# Patient Record
Sex: FEMALE | Race: WHITE
[De-identification: names, ages, dates, MRNs, and addresses within clinical notes are randomized per-mention and may not be internally consistent; named-entity substitution may affect disease eponyms.]

---

## 2017-07-01 ENCOUNTER — HEALTH MAINTENANCE LETTER (OUTPATIENT)
Age: 57
End: 2017-07-01

## 2019-10-03 ENCOUNTER — HEALTH MAINTENANCE LETTER (OUTPATIENT)
Age: 59
End: 2019-10-03

## 2020-01-24 ENCOUNTER — TRANSFERRED RECORDS (OUTPATIENT)
Dept: HEALTH INFORMATION MANAGEMENT | Facility: CLINIC | Age: 60
End: 2020-01-24

## 2020-01-24 PROBLEM — R19.00 PELVIC MASS: Status: ACTIVE | Noted: 2020-01-24

## 2020-01-24 PROBLEM — N13.30 HYDRONEPHROSIS: Status: ACTIVE | Noted: 2020-01-24

## 2020-01-24 PROBLEM — R97.1 ELEVATED CA-125: Status: ACTIVE | Noted: 2020-01-24

## 2020-01-29 ENCOUNTER — TRANSFERRED RECORDS (OUTPATIENT)
Dept: HEALTH INFORMATION MANAGEMENT | Facility: CLINIC | Age: 60
End: 2020-01-29

## 2020-02-07 ENCOUNTER — HOSPITAL ENCOUNTER (OUTPATIENT)
Dept: LAB | Facility: CLINIC | Age: 60
Discharge: HOME | DRG: 737 | End: 2020-02-07
Attending: OBSTETRICS & GYNECOLOGY | Admitting: OBSTETRICS & GYNECOLOGY
Payer: MEDICARE

## 2020-02-07 DIAGNOSIS — Z01.83 ENCOUNTER FOR BLOOD TYPING: Primary | ICD-10-CM

## 2020-02-07 PROCEDURE — 86923 COMPATIBILITY TEST ELECTRIC: CPT | Performed by: OBSTETRICS & GYNECOLOGY

## 2020-02-07 PROCEDURE — 86901 BLOOD TYPING SEROLOGIC RH(D): CPT | Performed by: OBSTETRICS & GYNECOLOGY

## 2020-02-07 PROCEDURE — 86850 RBC ANTIBODY SCREEN: CPT | Performed by: OBSTETRICS & GYNECOLOGY

## 2020-02-07 PROCEDURE — 36415 COLL VENOUS BLD VENIPUNCTURE: CPT | Performed by: OBSTETRICS & GYNECOLOGY

## 2020-02-07 PROCEDURE — 86900 BLOOD TYPING SEROLOGIC ABO: CPT | Performed by: OBSTETRICS & GYNECOLOGY

## 2020-02-07 RX ORDER — CYCLOBENZAPRINE HCL 5 MG
5 TABLET ORAL
COMMUNITY

## 2020-02-07 RX ORDER — LOSARTAN POTASSIUM 50 MG/1
50 TABLET ORAL DAILY
COMMUNITY

## 2020-02-07 RX ORDER — ATORVASTATIN CALCIUM 80 MG/1
80 TABLET, FILM COATED ORAL AT BEDTIME
COMMUNITY

## 2020-02-07 RX ORDER — TIMOLOL MALEATE 5 MG/ML
1 SOLUTION/ DROPS OPHTHALMIC EVERY MORNING
COMMUNITY

## 2020-02-08 ENCOUNTER — HEALTH MAINTENANCE LETTER (OUTPATIENT)
Age: 60
End: 2020-02-08

## 2020-02-10 ENCOUNTER — ANESTHESIA EVENT (OUTPATIENT)
Dept: SURGERY | Facility: CLINIC | Age: 60
DRG: 737 | End: 2020-02-10
Payer: MEDICARE

## 2020-02-10 ENCOUNTER — ANESTHESIA (OUTPATIENT)
Dept: SURGERY | Facility: CLINIC | Age: 60
DRG: 737 | End: 2020-02-10
Payer: MEDICARE

## 2020-02-10 ENCOUNTER — HOSPITAL ENCOUNTER (INPATIENT)
Facility: CLINIC | Age: 60
LOS: 17 days | Discharge: HOME | DRG: 737 | End: 2020-02-27
Attending: OBSTETRICS & GYNECOLOGY | Admitting: OBSTETRICS & GYNECOLOGY
Payer: MEDICARE

## 2020-02-10 DIAGNOSIS — R19.00 PELVIC MASS IN FEMALE: ICD-10-CM

## 2020-02-10 DIAGNOSIS — Z13.6 CARDIOVASCULAR SCREENING; LDL GOAL LESS THAN 130: ICD-10-CM

## 2020-02-10 DIAGNOSIS — R19.00 PELVIC MASS: Primary | ICD-10-CM

## 2020-02-10 DIAGNOSIS — G47.00 INSOMNIA: ICD-10-CM

## 2020-02-10 DIAGNOSIS — N13.30 HYDRONEPHROSIS: ICD-10-CM

## 2020-02-10 DIAGNOSIS — F41.9 ANXIETY: ICD-10-CM

## 2020-02-10 DIAGNOSIS — G40.909 EPILEPSY (H): ICD-10-CM

## 2020-02-10 DIAGNOSIS — J45.20 INTERMITTENT ASTHMA, WELL CONTROLLED: ICD-10-CM

## 2020-02-10 DIAGNOSIS — R97.1 ELEVATED CA-125: ICD-10-CM

## 2020-02-10 DIAGNOSIS — Z76.0 MEDICATION REFILL: ICD-10-CM

## 2020-02-10 DIAGNOSIS — J18.9 PNEUMONIA: ICD-10-CM

## 2020-02-10 LAB
ABO + RH BLD: NORMAL
ABO + RH BLD: NORMAL
BLD GP AB SCN SERPL QL: NORMAL
BLD PROD TYP BPU: NORMAL
BLOOD BANK CMNT PATIENT-IMP: NORMAL
BLOOD BANK CMNT PATIENT-IMP: NORMAL
CREAT SERPL-MCNC: 0.56 MG/DL (ref 0.52–1.04)
GFR SERPL CREATININE-BSD FRML MDRD: >90 ML/MIN/{1.73_M2}
NUM BPU REQUESTED: 4
PLATELET # BLD AUTO: 363 10E9/L (ref 150–450)
POTASSIUM SERPL-SCNC: 3.8 MMOL/L (ref 3.4–5.3)
SPECIMEN EXP DATE BLD: NORMAL

## 2020-02-10 PROCEDURE — 25000125 ZZHC RX 250: Performed by: REGISTERED NURSE

## 2020-02-10 PROCEDURE — 88307 TISSUE EXAM BY PATHOLOGIST: CPT | Mod: 26 | Performed by: OBSTETRICS & GYNECOLOGY

## 2020-02-10 PROCEDURE — 0DBW0ZZ EXCISION OF PERITONEUM, OPEN APPROACH: ICD-10-PCS | Performed by: OBSTETRICS & GYNECOLOGY

## 2020-02-10 PROCEDURE — 25800030 ZZH RX IP 258 OP 636: Performed by: NURSE PRACTITIONER

## 2020-02-10 PROCEDURE — 88331 PATH CONSLTJ SURG 1 BLK 1SPC: CPT | Mod: 26 | Performed by: OBSTETRICS & GYNECOLOGY

## 2020-02-10 PROCEDURE — 36000093 ZZH SURGERY LEVEL 4 1ST 30 MIN: Performed by: OBSTETRICS & GYNECOLOGY

## 2020-02-10 PROCEDURE — 88309 TISSUE EXAM BY PATHOLOGIST: CPT | Mod: 26 | Performed by: OBSTETRICS & GYNECOLOGY

## 2020-02-10 PROCEDURE — 0WBH0ZX EXCISION OF RETROPERITONEUM, OPEN APPROACH, DIAGNOSTIC: ICD-10-PCS | Performed by: OBSTETRICS & GYNECOLOGY

## 2020-02-10 PROCEDURE — 36415 COLL VENOUS BLD VENIPUNCTURE: CPT | Performed by: NURSE PRACTITIONER

## 2020-02-10 PROCEDURE — 25000128 H RX IP 250 OP 636: Performed by: ANESTHESIOLOGY

## 2020-02-10 PROCEDURE — 0DBU0ZZ EXCISION OF OMENTUM, OPEN APPROACH: ICD-10-PCS | Performed by: OBSTETRICS & GYNECOLOGY

## 2020-02-10 PROCEDURE — 25800030 ZZH RX IP 258 OP 636: Performed by: ANESTHESIOLOGY

## 2020-02-10 PROCEDURE — 25000128 H RX IP 250 OP 636: Performed by: REGISTERED NURSE

## 2020-02-10 PROCEDURE — 25800030 ZZH RX IP 258 OP 636: Performed by: REGISTERED NURSE

## 2020-02-10 PROCEDURE — 88305 TISSUE EXAM BY PATHOLOGIST: CPT | Performed by: OBSTETRICS & GYNECOLOGY

## 2020-02-10 PROCEDURE — 37000008 ZZH ANESTHESIA TECHNICAL FEE, 1ST 30 MIN: Performed by: OBSTETRICS & GYNECOLOGY

## 2020-02-10 PROCEDURE — 88309 TISSUE EXAM BY PATHOLOGIST: CPT | Performed by: OBSTETRICS & GYNECOLOGY

## 2020-02-10 PROCEDURE — 25000128 H RX IP 250 OP 636: Performed by: NURSE PRACTITIONER

## 2020-02-10 PROCEDURE — 27210794 ZZH OR GENERAL SUPPLY STERILE: Performed by: OBSTETRICS & GYNECOLOGY

## 2020-02-10 PROCEDURE — 0UT20ZZ RESECTION OF BILATERAL OVARIES, OPEN APPROACH: ICD-10-PCS | Performed by: OBSTETRICS & GYNECOLOGY

## 2020-02-10 PROCEDURE — 0TN70ZZ RELEASE LEFT URETER, OPEN APPROACH: ICD-10-PCS | Performed by: OBSTETRICS & GYNECOLOGY

## 2020-02-10 PROCEDURE — 88307 TISSUE EXAM BY PATHOLOGIST: CPT | Performed by: OBSTETRICS & GYNECOLOGY

## 2020-02-10 PROCEDURE — 0UT70ZZ RESECTION OF BILATERAL FALLOPIAN TUBES, OPEN APPROACH: ICD-10-PCS | Performed by: OBSTETRICS & GYNECOLOGY

## 2020-02-10 PROCEDURE — C1765 ADHESION BARRIER: HCPCS | Performed by: OBSTETRICS & GYNECOLOGY

## 2020-02-10 PROCEDURE — 25000128 H RX IP 250 OP 636: Performed by: OBSTETRICS & GYNECOLOGY

## 2020-02-10 PROCEDURE — 12000000 ZZH R&B MED SURG/OB

## 2020-02-10 PROCEDURE — 82565 ASSAY OF CREATININE: CPT | Performed by: NURSE PRACTITIONER

## 2020-02-10 PROCEDURE — 37000009 ZZH ANESTHESIA TECHNICAL FEE, EACH ADDTL 15 MIN: Performed by: OBSTETRICS & GYNECOLOGY

## 2020-02-10 PROCEDURE — 25000132 ZZH RX MED GY IP 250 OP 250 PS 637: Mod: GY | Performed by: NURSE PRACTITIONER

## 2020-02-10 PROCEDURE — 71000012 ZZH RECOVERY PHASE 1 LEVEL 1 FIRST HR: Performed by: OBSTETRICS & GYNECOLOGY

## 2020-02-10 PROCEDURE — 36000063 ZZH SURGERY LEVEL 4 EA 15 ADDTL MIN: Performed by: OBSTETRICS & GYNECOLOGY

## 2020-02-10 PROCEDURE — 25000132 ZZH RX MED GY IP 250 OP 250 PS 637: Mod: GY | Performed by: OBSTETRICS & GYNECOLOGY

## 2020-02-10 PROCEDURE — 88331 PATH CONSLTJ SURG 1 BLK 1SPC: CPT | Performed by: OBSTETRICS & GYNECOLOGY

## 2020-02-10 PROCEDURE — 85049 AUTOMATED PLATELET COUNT: CPT | Performed by: NURSE PRACTITIONER

## 2020-02-10 PROCEDURE — 0UT90ZZ RESECTION OF UTERUS, OPEN APPROACH: ICD-10-PCS | Performed by: OBSTETRICS & GYNECOLOGY

## 2020-02-10 PROCEDURE — 84132 ASSAY OF SERUM POTASSIUM: CPT | Performed by: ANESTHESIOLOGY

## 2020-02-10 PROCEDURE — 25000566 ZZH SEVOFLURANE, EA 15 MIN: Performed by: OBSTETRICS & GYNECOLOGY

## 2020-02-10 PROCEDURE — 88305 TISSUE EXAM BY PATHOLOGIST: CPT | Mod: 26,76 | Performed by: OBSTETRICS & GYNECOLOGY

## 2020-02-10 PROCEDURE — 25000128 H RX IP 250 OP 636: Performed by: NURSE ANESTHETIST, CERTIFIED REGISTERED

## 2020-02-10 PROCEDURE — 07BC0ZZ EXCISION OF PELVIS LYMPHATIC, OPEN APPROACH: ICD-10-PCS | Performed by: OBSTETRICS & GYNECOLOGY

## 2020-02-10 PROCEDURE — 40000170 ZZH STATISTIC PRE-PROCEDURE ASSESSMENT II: Performed by: OBSTETRICS & GYNECOLOGY

## 2020-02-10 PROCEDURE — 07BD0ZZ EXCISION OF AORTIC LYMPHATIC, OPEN APPROACH: ICD-10-PCS | Performed by: OBSTETRICS & GYNECOLOGY

## 2020-02-10 PROCEDURE — 36415 COLL VENOUS BLD VENIPUNCTURE: CPT | Performed by: ANESTHESIOLOGY

## 2020-02-10 PROCEDURE — 25000301 ZZH OR RX SURGIFLO W/THROMBIN KIT 2ML 1991 OPNP: Performed by: OBSTETRICS & GYNECOLOGY

## 2020-02-10 RX ORDER — FENTANYL CITRATE 50 UG/ML
INJECTION, SOLUTION INTRAMUSCULAR; INTRAVENOUS PRN
Status: DISCONTINUED | OUTPATIENT
Start: 2020-02-10 | End: 2020-02-10

## 2020-02-10 RX ORDER — LIDOCAINE HYDROCHLORIDE 20 MG/ML
INJECTION, SOLUTION INFILTRATION; PERINEURAL PRN
Status: DISCONTINUED | OUTPATIENT
Start: 2020-02-10 | End: 2020-02-10

## 2020-02-10 RX ORDER — ONDANSETRON 2 MG/ML
INJECTION INTRAMUSCULAR; INTRAVENOUS PRN
Status: DISCONTINUED | OUTPATIENT
Start: 2020-02-10 | End: 2020-02-10

## 2020-02-10 RX ORDER — CEFAZOLIN SODIUM 1 G/3ML
1 INJECTION, POWDER, FOR SOLUTION INTRAMUSCULAR; INTRAVENOUS SEE ADMIN INSTRUCTIONS
Status: DISCONTINUED | OUTPATIENT
Start: 2020-02-10 | End: 2020-02-10 | Stop reason: HOSPADM

## 2020-02-10 RX ORDER — KETOROLAC TROMETHAMINE 30 MG/ML
30 INJECTION, SOLUTION INTRAMUSCULAR; INTRAVENOUS EVERY 6 HOURS PRN
Status: DISCONTINUED | OUTPATIENT
Start: 2020-02-10 | End: 2020-02-12

## 2020-02-10 RX ORDER — HYDROCODONE BITARTRATE AND ACETAMINOPHEN 5; 325 MG/1; MG/1
1-2 TABLET ORAL EVERY 4 HOURS PRN
Status: DISCONTINUED | OUTPATIENT
Start: 2020-02-10 | End: 2020-02-22

## 2020-02-10 RX ORDER — LOSARTAN POTASSIUM 50 MG/1
50 TABLET ORAL DAILY
Status: DISCONTINUED | OUTPATIENT
Start: 2020-02-11 | End: 2020-02-12

## 2020-02-10 RX ORDER — ONDANSETRON 2 MG/ML
4 INJECTION INTRAMUSCULAR; INTRAVENOUS EVERY 30 MIN PRN
Status: DISCONTINUED | OUTPATIENT
Start: 2020-02-10 | End: 2020-02-10 | Stop reason: HOSPADM

## 2020-02-10 RX ORDER — FENTANYL CITRATE 50 UG/ML
25-50 INJECTION, SOLUTION INTRAMUSCULAR; INTRAVENOUS
Status: DISCONTINUED | OUTPATIENT
Start: 2020-02-10 | End: 2020-02-10 | Stop reason: HOSPADM

## 2020-02-10 RX ORDER — SODIUM CHLORIDE, SODIUM LACTATE, POTASSIUM CHLORIDE, CALCIUM CHLORIDE 600; 310; 30; 20 MG/100ML; MG/100ML; MG/100ML; MG/100ML
INJECTION, SOLUTION INTRAVENOUS CONTINUOUS
Status: DISCONTINUED | OUTPATIENT
Start: 2020-02-10 | End: 2020-02-10 | Stop reason: HOSPADM

## 2020-02-10 RX ORDER — HYDROMORPHONE HYDROCHLORIDE 1 MG/ML
.3-.5 INJECTION, SOLUTION INTRAMUSCULAR; INTRAVENOUS; SUBCUTANEOUS EVERY 5 MIN PRN
Status: DISCONTINUED | OUTPATIENT
Start: 2020-02-10 | End: 2020-02-10 | Stop reason: HOSPADM

## 2020-02-10 RX ORDER — HYDROCODONE BITARTRATE AND ACETAMINOPHEN 5; 325 MG/1; MG/1
1-2 TABLET ORAL EVERY 4 HOURS PRN
Qty: 15 TABLET | Refills: 0 | Status: SHIPPED | OUTPATIENT
Start: 2020-02-10 | End: 2020-02-24

## 2020-02-10 RX ORDER — NALOXONE HYDROCHLORIDE 0.4 MG/ML
.1-.4 INJECTION, SOLUTION INTRAMUSCULAR; INTRAVENOUS; SUBCUTANEOUS
Status: DISCONTINUED | OUTPATIENT
Start: 2020-02-10 | End: 2020-02-10

## 2020-02-10 RX ORDER — IBUPROFEN 600 MG/1
600 TABLET, FILM COATED ORAL EVERY 6 HOURS PRN
Qty: 30 TABLET | Refills: 1 | Status: SHIPPED | OUTPATIENT
Start: 2020-02-10

## 2020-02-10 RX ORDER — ATORVASTATIN CALCIUM 80 MG/1
80 TABLET, FILM COATED ORAL AT BEDTIME
Status: DISCONTINUED | OUTPATIENT
Start: 2020-02-10 | End: 2020-02-27 | Stop reason: HOSPADM

## 2020-02-10 RX ORDER — MEPERIDINE HYDROCHLORIDE 25 MG/ML
12.5 INJECTION INTRAMUSCULAR; INTRAVENOUS; SUBCUTANEOUS EVERY 5 MIN PRN
Status: DISCONTINUED | OUTPATIENT
Start: 2020-02-10 | End: 2020-02-10 | Stop reason: HOSPADM

## 2020-02-10 RX ORDER — SENNOSIDES A AND B 8.6 MG/1
1-3 TABLET, FILM COATED ORAL 2 TIMES DAILY PRN
Qty: 30 TABLET | Refills: 0 | Status: ON HOLD | OUTPATIENT
Start: 2020-02-10 | End: 2020-03-20

## 2020-02-10 RX ORDER — PROPOFOL 10 MG/ML
INJECTION, EMULSION INTRAVENOUS PRN
Status: DISCONTINUED | OUTPATIENT
Start: 2020-02-10 | End: 2020-02-10

## 2020-02-10 RX ORDER — CEFAZOLIN SODIUM 1 G/3ML
1 INJECTION, POWDER, FOR SOLUTION INTRAMUSCULAR; INTRAVENOUS EVERY 8 HOURS
Status: COMPLETED | OUTPATIENT
Start: 2020-02-11 | End: 2020-02-11

## 2020-02-10 RX ORDER — NEOSTIGMINE METHYLSULFATE 1 MG/ML
VIAL (ML) INJECTION PRN
Status: DISCONTINUED | OUTPATIENT
Start: 2020-02-10 | End: 2020-02-10

## 2020-02-10 RX ORDER — LIDOCAINE 40 MG/G
CREAM TOPICAL
Status: DISCONTINUED | OUTPATIENT
Start: 2020-02-10 | End: 2020-02-20

## 2020-02-10 RX ORDER — PROCHLORPERAZINE MALEATE 10 MG
10 TABLET ORAL EVERY 6 HOURS PRN
Status: DISCONTINUED | OUTPATIENT
Start: 2020-02-10 | End: 2020-02-27 | Stop reason: HOSPADM

## 2020-02-10 RX ORDER — ONDANSETRON 2 MG/ML
4 INJECTION INTRAMUSCULAR; INTRAVENOUS EVERY 6 HOURS PRN
Status: DISCONTINUED | OUTPATIENT
Start: 2020-02-10 | End: 2020-02-27 | Stop reason: HOSPADM

## 2020-02-10 RX ORDER — DEXTROSE MONOHYDRATE, SODIUM CHLORIDE, AND POTASSIUM CHLORIDE 50; 1.49; 4.5 G/1000ML; G/1000ML; G/1000ML
INJECTION, SOLUTION INTRAVENOUS CONTINUOUS
Status: ACTIVE | OUTPATIENT
Start: 2020-02-10 | End: 2020-02-19

## 2020-02-10 RX ORDER — ACETAMINOPHEN 325 MG/1
975 TABLET ORAL ONCE
Status: COMPLETED | OUTPATIENT
Start: 2020-02-10 | End: 2020-02-10

## 2020-02-10 RX ORDER — NALOXONE HYDROCHLORIDE 0.4 MG/ML
.1-.4 INJECTION, SOLUTION INTRAMUSCULAR; INTRAVENOUS; SUBCUTANEOUS
Status: DISCONTINUED | OUTPATIENT
Start: 2020-02-10 | End: 2020-02-27 | Stop reason: HOSPADM

## 2020-02-10 RX ORDER — ONDANSETRON 4 MG/1
4 TABLET, ORALLY DISINTEGRATING ORAL EVERY 6 HOURS PRN
Status: DISCONTINUED | OUTPATIENT
Start: 2020-02-10 | End: 2020-02-27 | Stop reason: HOSPADM

## 2020-02-10 RX ORDER — CARBAMAZEPINE 200 MG/1
400 CAPSULE, EXTENDED RELEASE ORAL 2 TIMES DAILY
Status: DISCONTINUED | OUTPATIENT
Start: 2020-02-10 | End: 2020-02-22

## 2020-02-10 RX ORDER — ACETAMINOPHEN 325 MG/1
650 TABLET ORAL EVERY 4 HOURS PRN
Status: DISCONTINUED | OUTPATIENT
Start: 2020-02-10 | End: 2020-02-23

## 2020-02-10 RX ORDER — CEFAZOLIN SODIUM 2 G/100ML
2 INJECTION, SOLUTION INTRAVENOUS
Status: COMPLETED | OUTPATIENT
Start: 2020-02-10 | End: 2020-02-10

## 2020-02-10 RX ORDER — ONDANSETRON 4 MG/1
4 TABLET, ORALLY DISINTEGRATING ORAL EVERY 30 MIN PRN
Status: DISCONTINUED | OUTPATIENT
Start: 2020-02-10 | End: 2020-02-10 | Stop reason: HOSPADM

## 2020-02-10 RX ORDER — DEXAMETHASONE SODIUM PHOSPHATE 4 MG/ML
INJECTION, SOLUTION INTRA-ARTICULAR; INTRALESIONAL; INTRAMUSCULAR; INTRAVENOUS; SOFT TISSUE PRN
Status: DISCONTINUED | OUTPATIENT
Start: 2020-02-10 | End: 2020-02-10

## 2020-02-10 RX ORDER — SODIUM CHLORIDE, SODIUM LACTATE, POTASSIUM CHLORIDE, CALCIUM CHLORIDE 600; 310; 30; 20 MG/100ML; MG/100ML; MG/100ML; MG/100ML
INJECTION, SOLUTION INTRAVENOUS CONTINUOUS PRN
Status: DISCONTINUED | OUTPATIENT
Start: 2020-02-10 | End: 2020-02-10

## 2020-02-10 RX ORDER — CARBAMAZEPINE 400 MG/1
400 TABLET, EXTENDED RELEASE ORAL 2 TIMES DAILY
Status: DISCONTINUED | OUTPATIENT
Start: 2020-02-10 | End: 2020-02-10

## 2020-02-10 RX ORDER — GLYCOPYRROLATE 0.2 MG/ML
INJECTION, SOLUTION INTRAMUSCULAR; INTRAVENOUS PRN
Status: DISCONTINUED | OUTPATIENT
Start: 2020-02-10 | End: 2020-02-10

## 2020-02-10 RX ORDER — CYCLOBENZAPRINE HCL 5 MG
5 TABLET ORAL
Status: DISCONTINUED | OUTPATIENT
Start: 2020-02-10 | End: 2020-02-27 | Stop reason: HOSPADM

## 2020-02-10 RX ADMIN — MIDAZOLAM 2 MG: 1 INJECTION INTRAMUSCULAR; INTRAVENOUS at 16:34

## 2020-02-10 RX ADMIN — PHENYLEPHRINE HYDROCHLORIDE 100 MCG: 10 INJECTION INTRAVENOUS at 18:04

## 2020-02-10 RX ADMIN — NEOSTIGMINE METHYLSULFATE 3.5 MG: 1 INJECTION, SOLUTION INTRAVENOUS at 18:50

## 2020-02-10 RX ADMIN — FENTANYL CITRATE 100 MCG: 50 INJECTION, SOLUTION INTRAMUSCULAR; INTRAVENOUS at 16:40

## 2020-02-10 RX ADMIN — POTASSIUM CHLORIDE, DEXTROSE MONOHYDRATE AND SODIUM CHLORIDE: 150; 5; 450 INJECTION, SOLUTION INTRAVENOUS at 20:51

## 2020-02-10 RX ADMIN — ROCURONIUM BROMIDE 50 MG: 10 INJECTION INTRAVENOUS at 16:40

## 2020-02-10 RX ADMIN — PHENYLEPHRINE HYDROCHLORIDE 100 MCG: 10 INJECTION INTRAVENOUS at 18:09

## 2020-02-10 RX ADMIN — ROPIVACAINE HYDROCHLORIDE 40 ML GIVEN: 5 INJECTION, SOLUTION EPIDURAL; INFILTRATION; PERINEURAL at 19:29

## 2020-02-10 RX ADMIN — ATORVASTATIN CALCIUM 80 MG: 80 TABLET, FILM COATED ORAL at 21:51

## 2020-02-10 RX ADMIN — DEXAMETHASONE SODIUM PHOSPHATE 4 MG: 4 INJECTION, SOLUTION INTRA-ARTICULAR; INTRALESIONAL; INTRAMUSCULAR; INTRAVENOUS; SOFT TISSUE at 17:01

## 2020-02-10 RX ADMIN — SODIUM CHLORIDE, POTASSIUM CHLORIDE, SODIUM LACTATE AND CALCIUM CHLORIDE: 600; 310; 30; 20 INJECTION, SOLUTION INTRAVENOUS at 16:34

## 2020-02-10 RX ADMIN — SODIUM CHLORIDE, POTASSIUM CHLORIDE, SODIUM LACTATE AND CALCIUM CHLORIDE: 600; 310; 30; 20 INJECTION, SOLUTION INTRAVENOUS at 19:44

## 2020-02-10 RX ADMIN — CARBAMAZEPINE 400 MG: 200 CAPSULE, EXTENDED RELEASE ORAL at 22:47

## 2020-02-10 RX ADMIN — SODIUM CHLORIDE, POTASSIUM CHLORIDE, SODIUM LACTATE AND CALCIUM CHLORIDE: 600; 310; 30; 20 INJECTION, SOLUTION INTRAVENOUS at 17:41

## 2020-02-10 RX ADMIN — ROCURONIUM BROMIDE 30 MG: 10 INJECTION INTRAVENOUS at 17:07

## 2020-02-10 RX ADMIN — LIDOCAINE HYDROCHLORIDE 100 MG: 20 INJECTION, SOLUTION INFILTRATION; PERINEURAL at 16:40

## 2020-02-10 RX ADMIN — HYDROMORPHONE HYDROCHLORIDE 0.5 MG: 1 INJECTION, SOLUTION INTRAMUSCULAR; INTRAVENOUS; SUBCUTANEOUS at 19:34

## 2020-02-10 RX ADMIN — ONDANSETRON 4 MG: 2 INJECTION INTRAMUSCULAR; INTRAVENOUS at 18:22

## 2020-02-10 RX ADMIN — SODIUM CHLORIDE, POTASSIUM CHLORIDE, SODIUM LACTATE AND CALCIUM CHLORIDE: 600; 310; 30; 20 INJECTION, SOLUTION INTRAVENOUS at 16:46

## 2020-02-10 RX ADMIN — HYDROMORPHONE HYDROCHLORIDE 0.5 MG: 1 INJECTION, SOLUTION INTRAMUSCULAR; INTRAVENOUS; SUBCUTANEOUS at 16:59

## 2020-02-10 RX ADMIN — CEFAZOLIN SODIUM 2 G: 2 INJECTION, SOLUTION INTRAVENOUS at 16:45

## 2020-02-10 RX ADMIN — HYDROMORPHONE HYDROCHLORIDE 0.5 MG: 1 INJECTION, SOLUTION INTRAMUSCULAR; INTRAVENOUS; SUBCUTANEOUS at 19:22

## 2020-02-10 RX ADMIN — HYDROMORPHONE HYDROCHLORIDE 0.3 MG: 1 INJECTION, SOLUTION INTRAMUSCULAR; INTRAVENOUS; SUBCUTANEOUS at 18:46

## 2020-02-10 RX ADMIN — Medication: at 19:13

## 2020-02-10 RX ADMIN — ROCURONIUM BROMIDE 10 MG: 10 INJECTION INTRAVENOUS at 18:05

## 2020-02-10 RX ADMIN — GLYCOPYRROLATE 0.5 MG: 0.2 INJECTION, SOLUTION INTRAMUSCULAR; INTRAVENOUS at 18:50

## 2020-02-10 RX ADMIN — ACETAMINOPHEN 975 MG: 325 TABLET, FILM COATED ORAL at 13:38

## 2020-02-10 RX ADMIN — PROPOFOL 180 MG: 10 INJECTION, EMULSION INTRAVENOUS at 16:40

## 2020-02-10 RX ADMIN — ROCURONIUM BROMIDE 10 MG: 10 INJECTION INTRAVENOUS at 18:16

## 2020-02-10 RX ADMIN — ROCURONIUM BROMIDE 10 MG: 10 INJECTION INTRAVENOUS at 17:39

## 2020-02-10 ASSESSMENT — ENCOUNTER SYMPTOMS: SEIZURES: 1

## 2020-02-10 ASSESSMENT — MIFFLIN-ST. JEOR: SCORE: 1386.43

## 2020-02-10 NOTE — ANESTHESIA PREPROCEDURE EVALUATION
Anesthesia Pre-Procedure Evaluation    Patient: Charlene Dia   MRN: [id number] : 1960          Preoperative Diagnosis: Elevated cancer antigen 125 () [R97.1]  Groin swelling [R19.09]  Akinetic mutism [R41.89]    Procedure(s):  EXPLORATORY LAPAROTOMY  TOTAL ABDOMINAL HYSTERECTOMY,  WITH BILATERAL SALPINGO-OOPHORECTOMY, TUMOR DEBULKING, POSSIBLE OMENTECTOMY, POSSIBLE PELVIC AND PARA AORTIC LYMPHADENECTOMY  POSSIBLE BOWEL RESECTION, POSSIBLE  OSTOMY    Past Medical History:   Diagnosis Date     Abdominal lump, pelvic mass      Anxiety 2012     Asthma, intermittent controlled      CARDIOVASCULAR SCREENING; LDL GOAL LESS THAN 130      Chronic neck pain      Chronic tension type headache      Cough      Epilepsy (H)      Epilepsy (H)      Hyperlipidemia      Hypertension      Insomnia      Insomnia      Mental developmental delay      Neurotic excoriations      Ovarian cancer (H)      Pain in both hands      Past Surgical History:   Procedure Laterality Date     CYSTECTOMY OVARIAN BENIGN      right     EYE SURGERY      hx cornea transplant     FOOT SURGERY       KERATOPLASTY (LAMELLAR GRAFT)  '94, '96, '00, '02       Anesthesia Evaluation     .             ROS/MED HX    ENT/Pulmonary:     (+)Intermittent asthma , recent URI . .    Neurologic:     (+)seizures     Cardiovascular:     (+) Dyslipidemia, hypertension----. : . . . :. .       METS/Exercise Tolerance:     Hematologic:         Musculoskeletal:         GI/Hepatic:        (-) GERD   Renal/Genitourinary:     (+) Other Renal/ Genitourinary, pelvic mass, hydronephrosis      Endo:  - neg endo ROS       Psychiatric:     (+) psychiatric history anxiety      Infectious Disease:  - neg infectious disease ROS       Malignancy:   (+) Malignancy History of Other  Other CA ovarian CA Active status post         Other:                          Physical Exam  Normal systems: cardiovascular, pulmonary and dental    Airway   Mallampati: II  TM distance:  ">3 FB  Neck ROM: full    Dental     Cardiovascular       Pulmonary             Lab Results   Component Value Date    WBC 13.4 (H) 03/15/2011    HGB 12.1 03/15/2011    HCT 35.8 03/15/2011     03/15/2011     03/15/2011    POTASSIUM 3.8 02/10/2020    CHLORIDE 98 03/15/2011    CO2 24 03/15/2011    BUN 10 03/15/2011    CR 0.67 03/15/2011     (H) 03/15/2011    FRED 9.0 03/15/2011    ALBUMIN 4.8 (H) 04/29/2007    PROTTOTAL 7.3 04/29/2007    ALT 20 06/16/2008    AST 18 06/16/2008    ALKPHOS 84 04/29/2007    BILITOTAL <0.1 (L) 04/29/2007    LIPASE 529 (H) 04/29/2007    HCG Negative 04/29/2007       Preop Vitals  BP Readings from Last 3 Encounters:   02/10/20 131/83   02/20/12 130/90   02/06/12 110/60    Pulse Readings from Last 3 Encounters:   02/20/12 76      Resp Readings from Last 3 Encounters:   02/10/20 16   02/20/12 12    SpO2 Readings from Last 3 Encounters:   02/10/20 96%      Temp Readings from Last 1 Encounters:   02/10/20 36.9  C (98.5  F) (Temporal)    Ht Readings from Last 1 Encounters:   02/10/20 1.727 m (5' 8\")      Wt Readings from Last 1 Encounters:   02/10/20 77.3 kg (170 lb 6.4 oz)    Estimated body mass index is 25.91 kg/m  as calculated from the following:    Height as of this encounter: 1.727 m (5' 8\").    Weight as of this encounter: 77.3 kg (170 lb 6.4 oz).       Anesthesia Plan      History & Physical Review  History and physical reviewed and following examination; no interval change.    ASA Status:  3 .    NPO Status:  > 8 hours    Plan for General, ETT, For Post-op pain in coordination with surgeon and Peripheral Nerve Block with Intravenous induction. Maintenance will be Balanced.    PONV prophylaxis:  Ondansetron (or other 5HT-3) and Dexamethasone or Solumedrol  Additional equipment: 2nd IV      Postoperative Care  Postoperative pain management:  IV analgesics and Peripheral nerve block (Single Shot).      Consents  Anesthetic plan, risks, benefits and alternatives discussed " with:  Patient.  Use of blood products discussed: Yes.   Use of blood products discussed with Patient.  Consented to blood products.  .                 Acosta Berry, DO, DO

## 2020-02-10 NOTE — PROGRESS NOTES
Medication History Completed by Medication Scribe  Admission medication history interview status for the 2/10/2020  admission is complete. See EPIC admission navigator for prior to admission medications     Medication history sources: Patient, Surescrimikal, H&P and Patient's home med list  Medication history source reliability: Good  Adherence assessment: N/A Not Observed    Significant changes made to the medication list:  None      Additional medication history information:   None    Medication reconciliation completed by provider prior to medication history? No    Time spent in this activity: 30 minutes      Prior to Admission medications    Medication Sig Last Dose Taking? Auth Provider   atorvastatin (LIPITOR) 80 MG tablet Take 80 mg by mouth At Bedtime 2/8/2020 at pm Yes Reported, Patient   carbamazepine (TEGRETOL XR) 100 MG 12 hr tablet Take 400 mg by mouth 2 times daily (Takes 4 x 100mg = 400mg) 2/10/2020 at 0800 Yes Reported, Patient   cyclobenzaprine (FLEXERIL) 5 MG tablet Take 5 mg by mouth nightly as needed for muscle spasms more than a week at prn Yes Reported, Patient   losartan (COZAAR) 50 MG tablet Take 50 mg by mouth daily 2/8/2020 at am Yes Reported, Patient   MELATONIN PO Take 10-15 mg by mouth every evening 2/9/2020 Yes Reported, Patient   naphazoline-pheniramine (OPCON-A/VISINE A/NAPHCON A) SOLN ophthalmic solution Place 1-2 drops into both eyes 4 times daily as needed for irritation 2/10/2020 at prn Yes Reported, Patient   sodium chloride (OCEAN) 0.65 % nasal spray Spray 1 spray into both nostrils every morning Past Week at prn Yes Reported, Patient   timolol maleate (TIMOPTIC) 0.5 % ophthalmic solution Place 1 drop into both eyes every morning 2/9/2020 at am Yes Reported, Patient

## 2020-02-11 ENCOUNTER — APPOINTMENT (OUTPATIENT)
Dept: OCCUPATIONAL THERAPY | Facility: CLINIC | Age: 60
DRG: 737 | End: 2020-02-11
Attending: OBSTETRICS & GYNECOLOGY
Payer: MEDICARE

## 2020-02-11 LAB
ANION GAP SERPL CALCULATED.3IONS-SCNC: 5 MMOL/L (ref 3–14)
BASOPHILS # BLD AUTO: 0 10E9/L (ref 0–0.2)
BASOPHILS NFR BLD AUTO: 0 %
BUN SERPL-MCNC: 9 MG/DL (ref 7–30)
CALCIUM SERPL-MCNC: 8 MG/DL (ref 8.5–10.1)
CHLORIDE SERPL-SCNC: 107 MMOL/L (ref 94–109)
CO2 SERPL-SCNC: 23 MMOL/L (ref 20–32)
CREAT SERPL-MCNC: 0.55 MG/DL (ref 0.52–1.04)
DIFFERENTIAL METHOD BLD: ABNORMAL
EOSINOPHIL # BLD AUTO: 0 10E9/L (ref 0–0.7)
EOSINOPHIL NFR BLD AUTO: 0 %
ERYTHROCYTE [DISTWIDTH] IN BLOOD BY AUTOMATED COUNT: 13.2 % (ref 10–15)
GFR SERPL CREATININE-BSD FRML MDRD: >90 ML/MIN/{1.73_M2}
GLUCOSE SERPL-MCNC: 149 MG/DL (ref 70–99)
HCT VFR BLD AUTO: 36.6 % (ref 35–47)
HGB BLD-MCNC: 12 G/DL (ref 11.7–15.7)
IMM GRANULOCYTES # BLD: 0 10E9/L (ref 0–0.4)
IMM GRANULOCYTES NFR BLD: 0.1 %
LYMPHOCYTES # BLD AUTO: 0.7 10E9/L (ref 0.8–5.3)
LYMPHOCYTES NFR BLD AUTO: 9.3 %
MCH RBC QN AUTO: 30.1 PG (ref 26.5–33)
MCHC RBC AUTO-ENTMCNC: 32.8 G/DL (ref 31.5–36.5)
MCV RBC AUTO: 92 FL (ref 78–100)
MONOCYTES # BLD AUTO: 1.1 10E9/L (ref 0–1.3)
MONOCYTES NFR BLD AUTO: 14.8 %
NEUTROPHILS # BLD AUTO: 5.5 10E9/L (ref 1.6–8.3)
NEUTROPHILS NFR BLD AUTO: 75.8 %
NRBC # BLD AUTO: 0 10*3/UL
NRBC BLD AUTO-RTO: 0 /100
PLATELET # BLD AUTO: 409 10E9/L (ref 150–450)
POTASSIUM SERPL-SCNC: 4.2 MMOL/L (ref 3.4–5.3)
RBC # BLD AUTO: 3.99 10E12/L (ref 3.8–5.2)
SODIUM SERPL-SCNC: 135 MMOL/L (ref 133–144)
WBC # BLD AUTO: 7.2 10E9/L (ref 4–11)

## 2020-02-11 PROCEDURE — 36415 COLL VENOUS BLD VENIPUNCTURE: CPT | Performed by: NURSE PRACTITIONER

## 2020-02-11 PROCEDURE — 25000132 ZZH RX MED GY IP 250 OP 250 PS 637: Mod: GY | Performed by: OBSTETRICS & GYNECOLOGY

## 2020-02-11 PROCEDURE — 12000000 ZZH R&B MED SURG/OB

## 2020-02-11 PROCEDURE — 97165 OT EVAL LOW COMPLEX 30 MIN: CPT | Mod: GO

## 2020-02-11 PROCEDURE — 25000132 ZZH RX MED GY IP 250 OP 250 PS 637: Mod: GY | Performed by: NURSE PRACTITIONER

## 2020-02-11 PROCEDURE — 25000128 H RX IP 250 OP 636: Performed by: NURSE PRACTITIONER

## 2020-02-11 PROCEDURE — 85025 COMPLETE CBC W/AUTO DIFF WBC: CPT | Performed by: NURSE PRACTITIONER

## 2020-02-11 PROCEDURE — 25800030 ZZH RX IP 258 OP 636: Performed by: OBSTETRICS & GYNECOLOGY

## 2020-02-11 PROCEDURE — 80048 BASIC METABOLIC PNL TOTAL CA: CPT | Performed by: NURSE PRACTITIONER

## 2020-02-11 PROCEDURE — 97535 SELF CARE MNGMENT TRAINING: CPT | Mod: GO

## 2020-02-11 PROCEDURE — 25800030 ZZH RX IP 258 OP 636: Performed by: NURSE PRACTITIONER

## 2020-02-11 RX ADMIN — CEFAZOLIN 1 G: 1 INJECTION, POWDER, FOR SOLUTION INTRAMUSCULAR; INTRAVENOUS at 00:46

## 2020-02-11 RX ADMIN — ACETAMINOPHEN 650 MG: 325 TABLET, FILM COATED ORAL at 10:34

## 2020-02-11 RX ADMIN — ATORVASTATIN CALCIUM 80 MG: 80 TABLET, FILM COATED ORAL at 21:04

## 2020-02-11 RX ADMIN — PROCHLORPERAZINE EDISYLATE 10 MG: 5 INJECTION INTRAMUSCULAR; INTRAVENOUS at 13:50

## 2020-02-11 RX ADMIN — SODIUM CHLORIDE 500 ML: 9 INJECTION, SOLUTION INTRAVENOUS at 06:17

## 2020-02-11 RX ADMIN — ONDANSETRON 4 MG: 4 TABLET, ORALLY DISINTEGRATING ORAL at 10:34

## 2020-02-11 RX ADMIN — Medication 1 LOZENGE: at 08:18

## 2020-02-11 RX ADMIN — CARBAMAZEPINE 400 MG: 200 CAPSULE, EXTENDED RELEASE ORAL at 21:05

## 2020-02-11 RX ADMIN — POTASSIUM CHLORIDE, DEXTROSE MONOHYDRATE AND SODIUM CHLORIDE: 150; 5; 450 INJECTION, SOLUTION INTRAVENOUS at 18:35

## 2020-02-11 RX ADMIN — Medication 1 LOZENGE: at 00:46

## 2020-02-11 RX ADMIN — ENOXAPARIN SODIUM 40 MG: 40 INJECTION SUBCUTANEOUS at 12:42

## 2020-02-11 RX ADMIN — ONDANSETRON 4 MG: 4 TABLET, ORALLY DISINTEGRATING ORAL at 16:27

## 2020-02-11 RX ADMIN — POTASSIUM CHLORIDE, DEXTROSE MONOHYDRATE AND SODIUM CHLORIDE: 150; 5; 450 INJECTION, SOLUTION INTRAVENOUS at 06:59

## 2020-02-11 RX ADMIN — CEFAZOLIN 1 G: 1 INJECTION, POWDER, FOR SOLUTION INTRAMUSCULAR; INTRAVENOUS at 08:18

## 2020-02-11 RX ADMIN — SODIUM CHLORIDE 500 ML: 9 INJECTION, SOLUTION INTRAVENOUS at 18:45

## 2020-02-11 RX ADMIN — LOSARTAN POTASSIUM 50 MG: 50 TABLET, FILM COATED ORAL at 08:18

## 2020-02-11 RX ADMIN — CARBAMAZEPINE 400 MG: 200 CAPSULE, EXTENDED RELEASE ORAL at 08:18

## 2020-02-11 ASSESSMENT — ACTIVITIES OF DAILY LIVING (ADL)
ADLS_ACUITY_SCORE: 14
ADLS_ACUITY_SCORE: 12
ADLS_ACUITY_SCORE: 15
ADLS_ACUITY_SCORE: 14
ADLS_ACUITY_SCORE: 14
ADLS_ACUITY_SCORE: 15

## 2020-02-11 NOTE — OP NOTE
Procedure Date: 02/10/2020      PREOPERATIVE DIAGNOSIS:  Pelvic mass, elevated CA-125.      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      PROCEDURES:  Exploratory laparotomy, radical resection of pelvic tumor with ADELE/BSO, left ureterolysis, excision of left cul-de-sac and pelvic peritoneum, omentectomy, and left pelvic and paraaortic lymphadenectomy.      INDICATIONS FOR THE PROCEDURE:  The patient is a 59-year-old female who presented to Dr. Pimentel for an annual exam on 01/08/2020.  She was complaining of right lower quadrant pain of a few weeks' duration.  On exam, she was found to have a large nodular mass in the lower half of the abdomen, more pronounced on the right, where it extended to the umbilicus.  Albumin was 4.1, hemoglobin A1c was 5.5.  Pelvic ultrasound revealed extensive loculated fluid with nodular soft tissue septations containing blood flow, highly suspicious for an ovarian carcinoma or peritoneal carcinomatosis.  CA-125 was drawn and was elevated at 321.1 IU/mL.  CT scan of the abdomen and pelvis revealed a large mass in the pelvis measuring 20 x 19 x 17 cm with thick septations but no obvious solid components, mild ascites, abnormal increased density of mesenteric and omental fat without obvious nodularity, mild left hydronephrosis and hydroureter.  She was seen in consultation in my office.  We elected to proceed with exploratory laparotomy, ADELE/BSO and likely staging.      PROCEDURE IN DETAIL:  The patient was taken to the operating room.  She was placed in a supine position on the operating room table.  She received broad-spectrum antibiotic prophylaxis.  Knee-high sequential compression devices were placed on her lower extremities.  General endotracheal anesthesia was administered from a supine position.  Once intubated, she was repositioned in low lithotomy position using well-padded Florencio stirrups.  Her arms were extended on arm boards at her shoulder  level or below.  She was prepped and draped in the usual sterile fashion, including insertion of a 16-Wallisian Perez catheter into her bladder.  A timeout was conducted, and everyone agreed upon the procedure.  I started by making a midline vertical incision from the symphysis pubis around the right side of the umbilicus and up to the infra-epigastric area.  It was taken down through the subcutaneous tissue through the fascia and through the natural division plane between the muscles.  The posterior rectus sheath was opened.  We then tented up the peritoneum and opened the peritoneum and extended this superiorly and inferiorly.  She had bloody ascites present when we entered the abdomen.  She had a multiloculated ovarian neoplasm that appeared to be coming from the left side, since the right side appeared normal.  This was densely adherent posteriorly to the pelvic peritoneum on the left-hand side near the colon, but not involving the colon.  She had nodular excrescences that were very suspicious for carcinoma, both superiorly to the left and posteriorly.  Her uterus was small and anterior to this big mass.  I was able to bring a large portion of the mass out of the abdomen.  I placed a Bookwalter retractor around the incisions and used lateral and inferior retractor blades to optimize visualization.  I then put the mass back in the abdomen.  I packed the small bowel and colon away with moist laps and a moist towel, held in place by a malleable retractor to the Bookwalter.  The round ligaments on either side were divided with the LigaSure device.  We opened up the posterior broad ligament peritoneum, isolated the ovarian vessels, making sure the ureter was not in them, and cauterized and divided the ovarian vessels at the pelvic brim with the LigaSure device.  We undercut the peritoneum towards the uterus.  The vesicouterine peritoneum was taken down, and the bladder was taken down off the anterior surface of the  cervix and upper vagina.  I then started mobilizing the mass.  You could feel that it was adherent to the pelvic sidewall, and I had to pry it off the pelvic peritoneum to get it up where we could divide the mass free of the cornua of the uterus.  Once this was done, the mass was passed off the table.  Two Carmalt clamps were placed at the angles of the uterus.  I then went back and dissected out the ureter, which had been densely adherent to the medial aspect of the posterior broad ligament peritoneum.  This was tagged with a vessel loop and lysed free from its entrance into the pelvis all the way to its entrance into the cardinal web.  I was then able to resect the pelvic peritoneum where the mass had been attached.  There was also some fibrinous changes in the presacral peritoneum that were biopsied as well, and I did a biopsy of the left pelvic peritoneum as well.  Given the malignant nature of this, I was waiting for pathology, but started doing the staging at this point.  We used upper abdominal retraction to optimize visualization.  The omentum was brought out of the abdomen.  We used electrocautery to free up the peritoneal attachments of the omentum to the sigmoid colon from the hepatic flexure to the splenic flexure.  We mobilized the gastrocolic ligament, and then we divided the omentum just below the gastroepiploic arcade out towards the spleen and then divided the omentum free near the splenic hilum with the LigaSure device.  It was passed off the table.  I then opened up the white line of Toldt along the descending colon.  I reflected the colon and ureter medially and held this in place with a retractor.  I had Stephany, my assistant, hold the ureter out of harm's way and dissected the left paraaortic nodes around the area of the BERNICE and above this area.  I then went to the pelvis.  I dissected out the lymph-bearing fatty tissue from the circumflex iliac vein to the distal common iliac artery.  Then, the  lymph-bearing fatty tissue was dissected off the external iliac artery and vein and out of the obturator fossa as well, freeing it up from the nerves and vessels as well as external iliac vein superiorly.  This was passed off as left pelvic lymphadenectomy.  Pathology did call back, stating that this was a highly poorly differentiated adenocarcinoma that had possibly serous or clear cell features, but that would be determined and the final pathology as well as where it originated from, whether it was ovarian or fallopian tube.  At that point, we spent some time making the left pelvic sidewall hemostatic.  There had been some neovascularization.  There were areas that were oversewn with 3-0 Vicryl suture, and there were other areas that were just treated with Surgiflo in that area with good hemostasis.  We then laid Seprafilm in the pelvis.  We also allowed the bowel to fall into its natural position.  We laid Seprafilm over the transverse mesocolon and over the bowel underlying the incision.  We then went ahead and closed, making sure the lap Sponge and instrument counts were correct.  We closed the incision with a mass closure using #1 looped PDS from superior and inferior aspects to the midline.  This was reinforced with #1 Ethibond sutures in an interrupted fashion.  Subcutaneous tissue was irrigated, and the skin was reapproximated with staples.  A TAP block was placed by Anesthesia after this, and a binder will be placed around her abdomen.  Estimated blood loss for the procedure 200 mL.      Stephany Hanley was my primary assist.  She helped me with all aspects of the case, providing necessary countertraction and performing her part of the hysterectomy and helped me with opening and closing of the abdomen.         GARY MESSER MD             D: 02/10/2020   T: 2020   MT: [id number]      Name:     JACQUELIN BOLDEN   MRN:      [id number]        Account:        [id number]   :      1960            Procedure Date: 02/10/2020      Document: [id number]       cc: Bessy Pimentel MD

## 2020-02-11 NOTE — PLAN OF CARE
POD 1. A&Ox4. VSS on RA. Capno on, WNL. C/o incisional pain, PCA dilaudid; somewhat effective. Midline dressing, drainage market. Clear liquid diet, tolerating. Perez patent. MD notified for low UO; fluid bolus ordered, currently infusing. L PIV infusing @75ml/hr, int abx. Discharge pending.

## 2020-02-11 NOTE — ANESTHESIA POSTPROCEDURE EVALUATION
Patient: Charlene Dia    Procedure(s):  EXPLORATORY LAPAROTOMY  TOTAL ABDOMINAL HYSTERECTOMY,  WITH BILATERAL SALPINGO-OOPHORECTOMY, TUMOR DEBULKING, OMENTECTOMY, PELVIC PARA  AORTIC LYMPHADENECTOMY, LEFT URETERAL LYSIS    Diagnosis:Elevated cancer antigen 125 () [R97.1]  Groin swelling [R19.09]  Akinetic mutism [R41.89]  Diagnosis Additional Information: No value filed.    Anesthesia Type:  General, ETT    Note:  Anesthesia Post Evaluation    Patient location during evaluation: PACU  Patient participation: Able to fully participate in evaluation  Level of consciousness: awake  Pain management: adequate  Airway patency: patent  Cardiovascular status: acceptable  Respiratory status: acceptable  Hydration status: acceptable  PONV: none     Anesthetic complications: None          Last vitals:  Vitals:    02/10/20 1950 02/10/20 2000 02/10/20 2028   BP: 109/58 111/54 116/69   Pulse: 60 58    Resp: 8 11 14   Temp:   35.9  C (96.6  F)   SpO2: 96% 96% 95%         Electronically Signed By: Earl White MD  February 10, 2020  9:25 PM

## 2020-02-11 NOTE — PROVIDER NOTIFICATION
MD Notification    Notified Person: MD    Notified Person Name: Braydon    Notification Date/Time: 2/11/20 1600    Notification Interaction: Telephone    Purpose of Notification: Patients BP 90/40,  mL- fluid bolus was this AM.    Orders Received: Continue to monitor BP. Hold BP medications if systolic BP <110     Comments:

## 2020-02-11 NOTE — PLAN OF CARE
0400-9324 A&O, very lethargic. VSS on 2L. Clear liquid diet, tolerating well. PIV infusing. C/O abdominal pain, PCA pump effective. Midline incision, dressing marked. Perez in place. Continue to monitor.

## 2020-02-11 NOTE — BRIEF OP NOTE
[facility]    Brief Operative Note    Pre-operative diagnosis: Elevated cancer antigen 125 () [R97.1]  Groin swelling [R19.09]  Akinetic mutism [R41.89]  Post-operative diagnosis Same as pre-operative diagnosis    Procedure: Procedure(s):  EXPLORATORY LAPAROTOMY  TOTAL ABDOMINAL HYSTERECTOMY,  WITH BILATERAL SALPINGO-OOPHORECTOMY, TUMOR DEBULKING, OMENTECTOMY, PELVIC PARA  AORTIC LYMPHADENECTOMY, LEFT URETERAL LYSIS  Surgeon: Surgeon(s) and Role:     * Paula Bryson MD - Primary  Anesthesia: General   Estimated blood loss: 200 ml  Drains: None  Specimens:   ID Type Source Tests Collected by Time Destination   A : LEFT OVARIAN TUMOR AND LEFT FALLOPIAN TUBE Tissue Fallopian Tube and Ovary, Left SURGICAL PATHOLOGY EXAM Paula Bryson MD 2/10/2020  5:29 PM    B : PRE SACRAL PERITONEUM Tissue Peritoneum SURGICAL PATHOLOGY EXAM Paula Bryson MD 2/10/2020  5:30 PM    C : UTERUS, CERVIX, RIGHT FALLOPIAN TUBE AND RIGHT OVARY Tissue Uterus, Cervix, Right Fallopian Tube SURGICAL PATHOLOGY EXAM aPula Bryson MD 2/10/2020  5:35 PM    D : LEFT PELVIC PERITONEUM Tissue Peritoneum SURGICAL PATHOLOGY EXAM Paula Bryson MD 2/10/2020  5:37 PM    E : OMENTUM Tissue Omentum SURGICAL PATHOLOGY EXAM Paula Bryson MD 2/10/2020  5:46 PM    F : LEFT PARA  AORTIC LYMPH NODE Tissue Lymph Node, Left Para Aortic SURGICAL PATHOLOGY EXAM Paula Bryson MD 2/10/2020  5:59 PM    G : LEFT PELVIC LYMPH NODE Tissue Lymph Node, Left Pelvic SURGICAL PATHOLOGY EXAM Paula Bryson MD 2/10/2020  6:01 PM      Findings:   Very large, multicystic left adnexal tumor, adherent to colon. Frozen demonstrates high grade ovarian vs fallopian tube cancer.   Complications: None.  Implants: * No implants in log *

## 2020-02-11 NOTE — PLAN OF CARE
Discharge Planner PT   Patient plan for discharge: home with assist as needed  Current status: PT: Order received; per chart review and conversation with evaluating OT, patient has no current PT needs at this time; patient independent with bed mobility, tx, and gait; Was able to ambulate 300 feet without an assistive device and no LOB; has no stairs; All needs met during OT session. Will complete PT order.  Barriers to return to prior living situation: defer to OT  Recommendations for discharge: defer to OT  Rationale for recommendations: Patient has no current PT needs identified at this time; Mobilizing without assist of an assistive device; family able to provide cares as needed. OT able to meet patient needs during hospitalization.       Entered by: Padma Pablo 02/11/2020 3:14 PM

## 2020-02-11 NOTE — PLAN OF CARE
Discharge Planner OT   Patient plan for discharge: home  Current status: OT eval/tx initiated. Pt lives alone in apt, is ind w/ all ADL's/IADL's at baseline.     After training, pt was able to complete bed mobility using log roll w/ spv-mod I. Ind w/ transfers. Mobility in/out of room x ~300 ft, initially was spv, progressed to ind w/ no LOB throughout. Pt able to don/doff socks ind seated EOB. Increased time spent educating on abd precautions and implications for safety w/ ADL/IADL and mobility. Pt and daughter have no further self-care concerns, as family will help w/ transportation/shopping upon d/c.   Barriers to return to prior living situation: none  Recommendations for discharge: home w/ A for shopping/transportation and higher level cleaning tasks  Rationale for recommendations: Pt is mobilizing well, demonstrated good maintenance of abdominal precautions after training during ADL's and functional mobility. Anticipate pt will safely return home w/ above A upon discharge.        Entered by: Patricia Silva 02/11/2020 11:39 AM     Occupational Therapy Discharge Summary    Reason for therapy discharge:    Discharged to home.    Progress towards therapy goal(s). [facility] for goal details.  Goals met    Therapy recommendation(s):    No further therapy is recommended.

## 2020-02-11 NOTE — PROGRESS NOTES
02/11/20 1031   Quick Adds   Type of Visit Initial Occupational Therapy Evaluation   Living Environment   Lives With alone   Living Arrangements apartment   Home Accessibility no concerns   Transportation Anticipated family or friend will provide   Living Environment Comment Pt has a walk-in shower w/ grab bars and a built in shower bench. Pt has grab bars by toilet    Self-Care   Usual Activity Tolerance good   Current Activity Tolerance moderate   Equipment Currently Used at Home none   Functional Level   Ambulation 0-->independent   Transferring 0-->independent   Toileting 0-->independent   Bathing 0-->independent   Dressing 0-->independent   Eating 0-->independent   Communication 0-->understands/communicates without difficulty   Swallowing 0-->swallows foods/liquids without difficulty   Cognition 0 - no cognition issues reported   Fall history within last six months no   Which of the above functional risks had a recent onset or change? ambulation;dressing;bathing   Prior Functional Level Comment PTA, pt lives alone in a handicap accessible apt, ind w/ all ADL's and IADL's w/o A.D.    General Information   Onset of Illness/Injury or Date of Surgery - Date 02/10/20   Referring Physician Paula Bryson MD   Patient/Family Goals Statement Return home   Additional Occupational Profile Info/Pertinent History of Current Problem POD 1 X-lap, radical resection of left adnexal tumor with TAHBSO, left ureterolysis, omentectomy, left pelvic and para-aortic lymphadenectomy.    Precautions/Limitations fall precautions;abdominal precautions   Cognitive Status Examination   Orientation orientation to person, place and time   Level of Consciousness alert   Follows Commands (Cognition) WNL   Visual Perception   Visual Perception Wears glasses   Pain Assessment   Patient Currently in Pain Yes, see Vital Sign flowsheet  (abdominal tightness, did not rate numerically)   Range of Motion (ROM)   ROM Comment BUE AROM WFL  "  Mobility   Bed Mobility Comments SBA and cueing   Transfer Skill: Sit to Stand   Level of [place]: Sit/Stand independent   Bathing   Level of [place] stand-by assist   Upper Body Dressing   Level of [place]: Dress Upper Body stand-by assist   Lower Body Dressing   Level of [place]: Dress Lower Body stand-by assist   Toileting   Level of [place]: Toilet stand-by assist   Grooming   Level of [place]: Grooming stand-by assist   Eating/Self Feeding   Level of [place]: Eating independent   Activities of Daily Living Analysis   Impairments Contributing to Impaired Activities of Daily Living post surgical precautions;pain   General Therapy Interventions   Planned Therapy Interventions ADL retraining;IADL retraining;bed mobility training;progressive activity/exercise;home program guidelines   Clinical Impression   Criteria for Skilled Therapeutic Interventions Met yes, treatment indicated   OT Diagnosis Decreased ind/ease w/ ADL's and IADL's   Influenced by the following impairments New abdominal precautions, post-op pain, decreased functional act tolerance   Assessment of Occupational Performance 1-3 Performance Deficits   Identified Performance Deficits Decreased ind/ease w/ bed mobility, functional mobility, dressing, bathing, toileting, IADLs   Clinical Decision Making (Complexity) Low complexity   Therapy Frequency   (eval and 1 treatment)   Predicted Duration of Therapy Intervention (days/wks) 1 day   Anticipated Discharge Disposition Home with Assist   Risks and Benefits of Treatment have been explained. Yes   Patient, Family & other staff in agreement with plan of care Yes   [facility] [employer] TM \"6 Clicks\"   2016, Trustees of [facility], under license to [employer].  All rights reserved.   6 Clicks Short Forms Daily Activity Inpatient Short Form   [facility] AM-PAC  \"6 Clicks\" Daily Activity Inpatient Short Form   1. Putting on and taking off regular lower " body clothing? 3 - A Little   2. Bathing (including washing, rinsing, drying)? 3 - A Little   3. Toileting, which includes using toilet, bedpan or urinal? 3 - A Little   4. Putting on and taking off regular upper body clothing? 3 - A Little   5. Taking care of personal grooming such as brushing teeth? 3 - A Little   6. Eating meals? 4 - None   Daily Activity Raw Score (Score out of 24.Lower scores equate to lower levels of function) 19   Total Evaluation Time   Total Evaluation Time (Minutes) 8

## 2020-02-11 NOTE — PROGRESS NOTES
"[facility]  Hospitalist Progress Note          Assessment and Plan:     Pelvic mass, elevated CA-125, left hydronephrosis:  POD 1 X-lap, radical resection of left adnexal tumor with TAHBSO, left ureterolysis, omentectomy, left pelvic and para-aortic lymphadenectomy. Discussed intra-op findings, procedure and likely chemotherapy in adjuvant setting once path back and recovered from surgery.  Clear liquid diet today.  Discontinue capnography.  Increase IV to 100 mL/hr until taking orals well.  Encourage ambulation      DVT Prophylaxis:  On lovenox and will continue for 4 wks after surgery.  Will need lovenox teaching prior to discharge       Epilepsy:  Continue Carbamazepine       Hyperlipidemia:  On Atorvastatin       Disposition:  Will have OT/PT and discharge planning see her to evaluate post op needed               Interval History:   Some pain as expected.  Has stood at bedside one time thus far              Medications:   I have reviewed this patient's current medications               Physical Exam:   Blood pressure 116/41, pulse 58, temperature 97.1  F (36.2  C), temperature source Oral, resp. rate 16, height 1.727 m (5' 8\"), weight 77.3 kg (170 lb 6.4 oz), SpO2 91 %.        Vital Sign Ranges  Temperature Temp  Av.2  F (36.2  C)  Min: 96.6  F (35.9  C)  Max: 98.5  F (36.9  C)   Blood pressure Systolic (24hrs), Av , Min:109 , Max:133        Diastolic (24hrs), Av, Min:41, Max:83      Pulse Pulse  Av.1  Min: 55  Max: 73   Respirations Resp  Av.9  Min: 8  Max: 19   Pulse oximetry SpO2  Av.3 %  Min: 91 %  Max: 100 %         Intake/Output Summary (Last 24 hours) at 2020 0824  Last data filed at 2020 0730  Gross per 24 hour   Intake 3000 ml   Output 550 ml   Net 2450 ml       Lungs:   Diminished BS at bases      Cardiovascular:   normal apical pulses      Abdomen:   Hypoactive BS, sanguinous drainage on dressing, mildly distended     Musculoskeletal:   no lower " extremity pitting edema present                Data:   All laboratory data reviewed

## 2020-02-11 NOTE — ANESTHESIA CARE TRANSFER NOTE
Patient: Charlene Dia    Procedure(s):  EXPLORATORY LAPAROTOMY  TOTAL ABDOMINAL HYSTERECTOMY,  WITH BILATERAL SALPINGO-OOPHORECTOMY, TUMOR DEBULKING, OMENTECTOMY, PELVIC PARA  AORTIC LYMPHADENECTOMY, LEFT URETERAL LYSIS    Diagnosis: Elevated cancer antigen 125 () [R97.1]  Groin swelling [R19.09]  Akinetic mutism [R41.89]  Diagnosis Additional Information: No value filed.    Anesthesia Type:   General, ETT     Note:  Airway :Face Mask  Patient transferred to:PACU  Comments: Transferred to PACU, spontaneous respirations, 10L oxygen via facemask.  All monitors and alarms on and functioning, VSS.  Patient awake, comfortable.  Report to PACU RN.Handoff Report: Identifed the Patient, Identified the Reponsible Provider, Reviewed the pertinent medical history, Discussed the surgical course, Reviewed Intra-OP anesthesia mangement and issues during anesthesia, Set expectations for post-procedure period and Allowed opportunity for questions and acknowledgement of understanding      Vitals: (Last set prior to Anesthesia Care Transfer)    CRNA VITALS  2/10/2020 1828 - 2/10/2020 1904      2/10/2020             NIBP:  (!) 141/95    Pulse:  90    NIBP Mean:  113    SpO2:  100 %    Resp Rate (observed):  (!) 32                Electronically Signed By: GLORY Kaufman CRNA  February 10, 2020  7:04 PM

## 2020-02-11 NOTE — PLAN OF CARE
POD1. Pt is A/Ox4. VSS, on RA. Capno discontinued. Incisional pain, rating 5/10 for most of shift, PCA dilaudid in place. Received tylenol x1 for headache. Midline incision-covered, drainage marked>no change from overnight. Hypo BS, not passing gas, clear liquid diet. Had 2 episodes of emesis this shift, zofran and compazine given. Perez patent w/ adequate tea colored UOP. IVF increased to 100/hr. Up SBA, ambulated in hallway twice this shift. Instructions on IS given, pt self admin. PT/OT consulted. Discharge pending post-op recovery.

## 2020-02-11 NOTE — PROGRESS NOTES
LIZABETH:  acknowledges consult. LIZABETH to visit patient Wed 2/12.    RILEY Fowler  Daytime (8:00am-4:30pm): [phone]  After-Hours LIZABETH Pager (4:30pm-11:30pm): [phone]

## 2020-02-11 NOTE — PROVIDER NOTIFICATION
MD Notification    Notified Person: MD    Notified Person Name: Dr. Bryson    Notification Date/Time: 0600, 2/11/2020    Notification Interaction: Phone call    Purpose of Notification: Low urine output    Orders Received:   500mL fluid bolus ordered & hemoglobin check this morning.

## 2020-02-12 ENCOUNTER — TELEPHONE (OUTPATIENT)
Dept: ONCOLOGY | Facility: CLINIC | Age: 60
End: 2020-02-12

## 2020-02-12 ENCOUNTER — APPOINTMENT (OUTPATIENT)
Dept: CT IMAGING | Facility: CLINIC | Age: 60
DRG: 737 | End: 2020-02-12
Attending: NURSE PRACTITIONER
Payer: MEDICARE

## 2020-02-12 ENCOUNTER — APPOINTMENT (OUTPATIENT)
Dept: GENERAL RADIOLOGY | Facility: CLINIC | Age: 60
DRG: 737 | End: 2020-02-12
Attending: OBSTETRICS & GYNECOLOGY
Payer: MEDICARE

## 2020-02-12 LAB
ALBUMIN SERPL-MCNC: 2.4 G/DL (ref 3.4–5)
ALBUMIN UR-MCNC: 100 MG/DL
ALP SERPL-CCNC: 85 U/L (ref 40–150)
ALT SERPL W P-5'-P-CCNC: 17 U/L (ref 0–50)
AMORPH CRY #/AREA URNS HPF: ABNORMAL /HPF
ANION GAP SERPL CALCULATED.3IONS-SCNC: 4 MMOL/L (ref 3–14)
APPEARANCE UR: ABNORMAL
AST SERPL W P-5'-P-CCNC: 9 U/L (ref 0–45)
BASE DEFICIT BLDV-SCNC: 1.6 MMOL/L
BILIRUB SERPL-MCNC: 0.4 MG/DL (ref 0.2–1.3)
BILIRUB UR QL STRIP: NEGATIVE
BLD PROD TYP BPU: NORMAL
BLD UNIT ID BPU: 0
BLOOD PRODUCT CODE: NORMAL
BPU ID: NORMAL
BUN SERPL-MCNC: 17 MG/DL (ref 7–30)
CALCIUM SERPL-MCNC: 8.1 MG/DL (ref 8.5–10.1)
CHLORIDE SERPL-SCNC: 104 MMOL/L (ref 94–109)
CO2 SERPL-SCNC: 24 MMOL/L (ref 20–32)
COLOR UR AUTO: YELLOW
CREAT SERPL-MCNC: 1.42 MG/DL (ref 0.52–1.04)
CREAT UR-MCNC: 290 MG/DL
ERYTHROCYTE [DISTWIDTH] IN BLOOD BY AUTOMATED COUNT: 13.5 % (ref 10–15)
ERYTHROCYTE [DISTWIDTH] IN BLOOD BY AUTOMATED COUNT: 13.6 % (ref 10–15)
FRACT EXCRET NA UR+SERPL-RTO: <0 %
GFR SERPL CREATININE-BSD FRML MDRD: 40 ML/MIN/{1.73_M2}
GLUCOSE SERPL-MCNC: 158 MG/DL (ref 70–99)
GLUCOSE UR STRIP-MCNC: 30 MG/DL
HCO3 BLDV-SCNC: 25 MMOL/L (ref 21–28)
HCT VFR BLD AUTO: 30.4 % (ref 35–47)
HCT VFR BLD AUTO: 33.8 % (ref 35–47)
HGB BLD-MCNC: 10 G/DL (ref 11.7–15.7)
HGB BLD-MCNC: 11.1 G/DL (ref 11.7–15.7)
HGB UR QL STRIP: ABNORMAL
KETONES UR STRIP-MCNC: NEGATIVE MG/DL
LACTATE BLD-SCNC: 1 MMOL/L (ref 0.7–2)
LACTATE BLD-SCNC: 1.4 MMOL/L (ref 0.7–2)
LEUKOCYTE ESTERASE UR QL STRIP: ABNORMAL
MCH RBC QN AUTO: 30.1 PG (ref 26.5–33)
MCH RBC QN AUTO: 30.3 PG (ref 26.5–33)
MCHC RBC AUTO-ENTMCNC: 32.8 G/DL (ref 31.5–36.5)
MCHC RBC AUTO-ENTMCNC: 32.9 G/DL (ref 31.5–36.5)
MCV RBC AUTO: 92 FL (ref 78–100)
MCV RBC AUTO: 92 FL (ref 78–100)
MUCOUS THREADS #/AREA URNS LPF: PRESENT /LPF
NITRATE UR QL: NEGATIVE
NT-PROBNP SERPL-MCNC: 215 PG/ML (ref 0–900)
O2/TOTAL GAS SETTING VFR VENT: ABNORMAL %
OXYHGB MFR BLDV: 34 %
PCO2 BLDV: 47 MM HG (ref 40–50)
PH BLDV: 7.33 PH (ref 7.32–7.43)
PH UR STRIP: 5.5 PH (ref 5–7)
PLATELET # BLD AUTO: 384 10E9/L (ref 150–450)
PLATELET # BLD AUTO: 427 10E9/L (ref 150–450)
PO2 BLDV: 23 MM HG (ref 25–47)
POTASSIUM SERPL-SCNC: 4.5 MMOL/L (ref 3.4–5.3)
PROT SERPL-MCNC: 5.4 G/DL (ref 6.8–8.8)
RBC # BLD AUTO: 3.32 10E12/L (ref 3.8–5.2)
RBC # BLD AUTO: 3.66 10E12/L (ref 3.8–5.2)
RBC #/AREA URNS AUTO: 36 /HPF (ref 0–2)
SODIUM SERPL-SCNC: 132 MMOL/L (ref 133–144)
SODIUM UR-SCNC: <5 MMOL/L
SOURCE: ABNORMAL
SP GR UR STRIP: 1.02 (ref 1–1.03)
TRANSFUSION STATUS PATIENT QL: NORMAL
TROPONIN I SERPL-MCNC: <0.015 UG/L (ref 0–0.04)
UROBILINOGEN UR STRIP-MCNC: NORMAL MG/DL (ref 0–2)
WBC # BLD AUTO: 19 10E9/L (ref 4–11)
WBC # BLD AUTO: 20.5 10E9/L (ref 4–11)
WBC #/AREA URNS AUTO: 10 /HPF (ref 0–5)

## 2020-02-12 PROCEDURE — 83605 ASSAY OF LACTIC ACID: CPT | Performed by: NURSE PRACTITIONER

## 2020-02-12 PROCEDURE — 80053 COMPREHEN METABOLIC PANEL: CPT | Performed by: NURSE PRACTITIONER

## 2020-02-12 PROCEDURE — 36415 COLL VENOUS BLD VENIPUNCTURE: CPT | Performed by: NURSE PRACTITIONER

## 2020-02-12 PROCEDURE — 82805 BLOOD GASES W/O2 SATURATION: CPT | Performed by: NURSE PRACTITIONER

## 2020-02-12 PROCEDURE — 74176 CT ABD & PELVIS W/O CONTRAST: CPT

## 2020-02-12 PROCEDURE — 25800030 ZZH RX IP 258 OP 636: Performed by: OBSTETRICS & GYNECOLOGY

## 2020-02-12 PROCEDURE — 93005 ELECTROCARDIOGRAM TRACING: CPT

## 2020-02-12 PROCEDURE — 36415 COLL VENOUS BLD VENIPUNCTURE: CPT | Performed by: OBSTETRICS & GYNECOLOGY

## 2020-02-12 PROCEDURE — 84484 ASSAY OF TROPONIN QUANT: CPT | Performed by: NURSE PRACTITIONER

## 2020-02-12 PROCEDURE — 85027 COMPLETE CBC AUTOMATED: CPT | Performed by: OBSTETRICS & GYNECOLOGY

## 2020-02-12 PROCEDURE — 71045 X-RAY EXAM CHEST 1 VIEW: CPT

## 2020-02-12 PROCEDURE — 99291 CRITICAL CARE FIRST HOUR: CPT | Performed by: NURSE PRACTITIONER

## 2020-02-12 PROCEDURE — 85027 COMPLETE CBC AUTOMATED: CPT | Performed by: NURSE PRACTITIONER

## 2020-02-12 PROCEDURE — 25800030 ZZH RX IP 258 OP 636: Performed by: INTERNAL MEDICINE

## 2020-02-12 PROCEDURE — 83605 ASSAY OF LACTIC ACID: CPT | Performed by: OBSTETRICS & GYNECOLOGY

## 2020-02-12 PROCEDURE — 87040 BLOOD CULTURE FOR BACTERIA: CPT | Performed by: NURSE PRACTITIONER

## 2020-02-12 PROCEDURE — 84300 ASSAY OF URINE SODIUM: CPT | Performed by: NURSE PRACTITIONER

## 2020-02-12 PROCEDURE — 12000000 ZZH R&B MED SURG/OB

## 2020-02-12 PROCEDURE — 81001 URINALYSIS AUTO W/SCOPE: CPT | Performed by: NURSE PRACTITIONER

## 2020-02-12 PROCEDURE — 25800030 ZZH RX IP 258 OP 636: Performed by: NURSE PRACTITIONER

## 2020-02-12 PROCEDURE — 25000132 ZZH RX MED GY IP 250 OP 250 PS 637: Mod: GY | Performed by: NURSE PRACTITIONER

## 2020-02-12 PROCEDURE — 93010 ELECTROCARDIOGRAM REPORT: CPT | Performed by: INTERNAL MEDICINE

## 2020-02-12 PROCEDURE — 25000128 H RX IP 250 OP 636: Performed by: NURSE PRACTITIONER

## 2020-02-12 PROCEDURE — 83880 ASSAY OF NATRIURETIC PEPTIDE: CPT | Performed by: NURSE PRACTITIONER

## 2020-02-12 PROCEDURE — 82570 ASSAY OF URINE CREATININE: CPT | Performed by: NURSE PRACTITIONER

## 2020-02-12 PROCEDURE — 25000132 ZZH RX MED GY IP 250 OP 250 PS 637: Mod: GY | Performed by: OBSTETRICS & GYNECOLOGY

## 2020-02-12 RX ORDER — ALBUMIN, HUMAN INJ 5% 5 %
25 SOLUTION INTRAVENOUS ONCE
Status: COMPLETED | OUTPATIENT
Start: 2020-02-12 | End: 2020-02-13

## 2020-02-12 RX ORDER — HEPARIN SODIUM 5000 [USP'U]/.5ML
5000 INJECTION, SOLUTION INTRAVENOUS; SUBCUTANEOUS EVERY 8 HOURS
Status: DISCONTINUED | OUTPATIENT
Start: 2020-02-12 | End: 2020-02-18

## 2020-02-12 RX ORDER — PIPERACILLIN SODIUM, TAZOBACTAM SODIUM 3; .375 G/15ML; G/15ML
3.38 INJECTION, POWDER, LYOPHILIZED, FOR SOLUTION INTRAVENOUS EVERY 6 HOURS
Status: DISCONTINUED | OUTPATIENT
Start: 2020-02-12 | End: 2020-02-16

## 2020-02-12 RX ADMIN — SODIUM CHLORIDE, POTASSIUM CHLORIDE, SODIUM LACTATE AND CALCIUM CHLORIDE 500 ML: 600; 310; 30; 20 INJECTION, SOLUTION INTRAVENOUS at 16:08

## 2020-02-12 RX ADMIN — HEPARIN SODIUM 5000 UNITS: 5000 INJECTION, SOLUTION INTRAVENOUS; SUBCUTANEOUS at 09:28

## 2020-02-12 RX ADMIN — CARBAMAZEPINE 400 MG: 200 CAPSULE, EXTENDED RELEASE ORAL at 09:28

## 2020-02-12 RX ADMIN — PIPERACILLIN AND TAZOBACTAM 3.38 G: 3; .375 INJECTION, POWDER, FOR SOLUTION INTRAVENOUS at 20:05

## 2020-02-12 RX ADMIN — POTASSIUM CHLORIDE, DEXTROSE MONOHYDRATE AND SODIUM CHLORIDE: 150; 5; 450 INJECTION, SOLUTION INTRAVENOUS at 20:54

## 2020-02-12 RX ADMIN — ACETAMINOPHEN 650 MG: 325 TABLET, FILM COATED ORAL at 20:54

## 2020-02-12 RX ADMIN — PIPERACILLIN AND TAZOBACTAM 3.38 G: 3; .375 INJECTION, POWDER, FOR SOLUTION INTRAVENOUS at 13:32

## 2020-02-12 RX ADMIN — ATORVASTATIN CALCIUM 80 MG: 80 TABLET, FILM COATED ORAL at 20:55

## 2020-02-12 RX ADMIN — HEPARIN SODIUM 5000 UNITS: 5000 INJECTION, SOLUTION INTRAVENOUS; SUBCUTANEOUS at 18:12

## 2020-02-12 RX ADMIN — ONDANSETRON 4 MG: 2 INJECTION INTRAMUSCULAR; INTRAVENOUS at 05:59

## 2020-02-12 RX ADMIN — SODIUM CHLORIDE, POTASSIUM CHLORIDE, SODIUM LACTATE AND CALCIUM CHLORIDE 500 ML: 600; 310; 30; 20 INJECTION, SOLUTION INTRAVENOUS at 07:46

## 2020-02-12 RX ADMIN — PIPERACILLIN AND TAZOBACTAM 3.38 G: 3; .375 INJECTION, POWDER, FOR SOLUTION INTRAVENOUS at 10:19

## 2020-02-12 RX ADMIN — CARBAMAZEPINE 400 MG: 200 CAPSULE, EXTENDED RELEASE ORAL at 20:54

## 2020-02-12 RX ADMIN — ONDANSETRON 4 MG: 4 TABLET, ORALLY DISINTEGRATING ORAL at 16:07

## 2020-02-12 RX ADMIN — SODIUM CHLORIDE 500 ML: 9 INJECTION, SOLUTION INTRAVENOUS at 03:59

## 2020-02-12 RX ADMIN — POTASSIUM CHLORIDE, DEXTROSE MONOHYDRATE AND SODIUM CHLORIDE: 150; 5; 450 INJECTION, SOLUTION INTRAVENOUS at 09:27

## 2020-02-12 ASSESSMENT — ACTIVITIES OF DAILY LIVING (ADL)
ADLS_ACUITY_SCORE: 14

## 2020-02-12 NOTE — PLAN OF CARE
POD2. A&Ox4. Soft Bps. 2L oxygen via NC. Up SBA, can be impulsive. Pulled out Verde & IV at shift change. MD notified for low UO, oxygen need, & verde removal. Chest XRAY ordered, verde reinserted, & 500mL bolus ordered. IS at bedside, needs encouragement for use. Full liquid diet. Emesis x1, zofran given. Bowel sounds hypoactive, denies gas. Midline incision with dried drainage, dressing marked & abdominal binder in place, abdomen rounded with slight irregular contuour. PCA dilaudid for pain control, somewhat effective. PIV infusing @100ml/hr. Verde patent.  (RRT called for altered mental status & chest pressure. Pt could not state name, place, or time. C/o chest pressure & severe abdominal pain. Labs ordered & rounding team to see this AM. See MD note.)

## 2020-02-12 NOTE — PROGRESS NOTES
POD2. A/Ox4. Tmax 99.7. All other VSS, weaned off of O2, sating at 96% on RA. Pressure pain to abd, PCA (0.2mg dilaudid) in use. Hypo BS, not passing gas, made NPO this AM. Abd distended, midline incision KAMLA, WNL. CT of abd/pelvis completed today-unremarkable. Received additonal 500mL fluid bolus this AM. Cr: 1.42. Nephrology consulted-yet to see pt. Perez w/ 230mL output this shift-MD aware and awaiting nephrology's recommendation. UA w/ small amt of WBCs, already receiving abx. Blood cultures drawn today. PIV w/ IVF. Up SBA, ambulating in hallway. Discharge pending bowel function.

## 2020-02-12 NOTE — PROVIDER NOTIFICATION
MD Notification    Notified Person: MD    Notified Person Name: BIJU Hanley    Notification Date/Time: 2/11/20 9782    Notification Interaction: Telephone    Purpose of Notification: Patients BP 94/41, all other VSS. Low UOP.    Orders Received: Give 500 ml NS fluid bolus, monitor BP and UOP.    Comments:

## 2020-02-12 NOTE — PLAN OF CARE
POD1. A&O. VSS ex soft BPs. Up SBA, ambulated in the halls. Tolerating clear liquids, advanced to fulls. Perez in place. 500 ml fluid bolus given for low urine output and soft bps. C/O nausea given zofran. PCA pump effective at pain control. PIV infusing. Continue to monitor.

## 2020-02-12 NOTE — CODE/RAPID RESPONSE
[facility]    RRT Note  2/12/2020   Time Called: 0530    RRT called for: confusion, chest pressure    Assessment & Plan     Acute Pain in the post operative setting  Acute Kidney Injury  --concern for possible intraabdominal process including but not limited to bleeding and ischemia in the post operative setting  I was called to evaluate the patient for development of acute confusion and complaints of chest pressure.  Nursing staff reports when the patient was woke out of sleep at approximately 530 this morning she was slightly confused which was not her baseline.  The confusion improved with further conversation and waking.  However the patient did initially complain of some chest pressure upon further questioning it was noted that she is having increased abdominal pain.  Nursing staff report the patient has had markedly low urine output overnight requiring 2 fluid boluses of 500 mils of saline.  She is postop day 2 from exploratory lap for ADELE/BSO, with left ureterolysis, omentectomy, left pelvic and para-aortic lymphadenectomy.  Upon my arrival patient is lying in bed, ill-appearing, pale with complaints of acute abdominal pain.  Brief neurologic exam completed no focal deficits noted, patient is slightly confused to time.  Patient's abdominal binder was opened and abdominal distention present predominantly to the lower abdomen and patient is very tender to palpation.  The patient's pain and abdominal distention does seem out of proportion for the situation.  Patient did use 3.2 mg of Dilaudid overnight via her PCA.   There is shadowed bloody drainage on surgical dressing.    Bladder scan was performed however unable to achieve optimal positioning due to location of surgical incision.  Bladder scan does indicate approximately 184 mL of fluid.  Perez catheter does appear patent, draining concentrated urine.   I did contact Dr. Jessica Beavers partner of Dr. Bryson regarding patient's status  and findings on abdominal assessment.  Her recommendations at this time are to defer any abdominal scanning to the primary team with this is been completed by Dr. Bryson's group this morning.  Thus I will defer any abdominal scans at this time to the primary team and complete laboratory studies for further work-up.  Patient's blood pressure did decrease during RRT to 105/35 and SOO noted on laboratory studies thus an additional 500 of LR bolus ordered until assessment by primary team.    INTERVENTIONS:  -CBC, CMP, LA, VBG, BNP, troponin stat  -EKG stat  -Zofran 4mg IV now  -LR 500ml bolus    At the end of the RRT provider hand off was given to my colleague Silvia Mabry NP for ongoing management.    Discussed with and defer further cares to Dr. Saravanan Beavers, flying [facility] RN, and bedside RN.    Interval History     Charlene Dia is a 59 year old female who was admitted on 2/10/2020 for exploratory lap for radical resection of left adnexal tumor with TAHBSO, left ureterolysis, omentectomy, left pelvic and para-aortic lymphadenectomy.    Medical history significant for:   Past Medical History:   Diagnosis Date     Abdominal lump, pelvic mass      Anxiety 2/6/2012     Asthma, intermittent controlled      CARDIOVASCULAR SCREENING; LDL GOAL LESS THAN 130      Chronic neck pain      Chronic tension type headache      Cough      Epilepsy (H)      Epilepsy (H)      Hyperlipidemia      Hypertension      Insomnia      Insomnia      Mental developmental delay      Neurotic excoriations      Ovarian cancer (H)      Pain in both hands      Past Surgical History:   Procedure Laterality Date     CYSTECTOMY OVARIAN BENIGN  07/07    right     EYE SURGERY      hx cornea transplant     FOOT SURGERY  04/03     HYSTERECTOMY TOTAL ABDOMINAL, BILATERAL SALPINGO-OOPHORECTOMY, NODE DISSECTION, COMBINED N/A 2/10/2020    Procedure: TOTAL ABDOMINAL HYSTERECTOMY,  WITH BILATERAL SALPINGO-OOPHORECTOMY, TUMOR DEBULKING, OMENTECTOMY, PELVIC PARA   AORTIC LYMPHADENECTOMY, LEFT URETERAL LYSIS;  Surgeon: Paula Bryson MD;  Location:  OR     KERATOPLASTY (LAMELLAR GRAFT)  '94, '96, '00, '02     LAPAROTOMY, STAGING, COMBINED N/A 2/10/2020    Procedure: EXPLORATORY LAPAROTOMY;  Surgeon: Paula Bryson MD;  Location:  OR       Code Status: No Order    Allergies   No Known Allergies    Physical Exam   Vital Signs with Ranges:  Temp:  [97.1  F (36.2  C)-99.7  F (37.6  C)] 99.2  F (37.3  C)  Pulse:  [78] 78  Heart Rate:  [] 105  Resp:  [16-20] 20  BP: ()/(33-62) 126/56  SpO2:  [90 %-94 %] 94 %  I/O last 3 completed shifts:  In: 620 [P.O.:120; I.V.:500]  Out: 425 [Urine:425]    Constitutional: alert, pale, ill-appearing  ENT: mucus membrane dry, EOM intact, pupils equal and reactive to light   Neck: supple  Pulmonary: Clear bilaterally, no wheezing, crackles, or rhonchi, normal work of breathing  Cardiovascular: Normal rate and rhythm, S1, S2, no murmur, rub, or gallop  GI: bowel sounds are active, tender to palpation, lower abdominal distention   Skin/Integumen: pale, dry  Neuro: alert, oriented x3, no focal deficits  Psych:  Flat affect  Extremities: no peripheral edema    Data     EKG:  Interpreted by GLORY Leslie CNP  Time reviewed: 0635  Symptoms at time of EKG: chest pressure   Rhythm: normal sinus   Rate: Normal  Axis: Normal  Ectopy: premature atrial contraction  Conduction: normal  ST Segments/ T Waves: No acute ischemic changes  Q Waves: none  Comparison to prior: T wave flattening in V3, V4    Clinical Impression: no acute changes    ABG:  -  Recent Labs   Lab 02/12/20  0606   O2PER 2lpm       Troponin:    No lab results found.    IMAGING: (X-ray/CT/MRI)   Recent Results (from the past 24 hour(s))   XR Chest Port 1 View    Narrative    EXAM: XR CHEST PORT 1 VW  LOCATION: [facility]  DATE/TIME: 2/12/2020 2:38 AM    INDICATION: Low O2 sats.  COMPARISON: 03/15/2011.      Impression    IMPRESSION: Heart is  slightly enlarged. No pneumothorax. Small amount of bibasilar atelectasis or infiltrate. No significant pleural effusions. Bony thorax unremarkable.       CBC with Diff:  Recent Labs   Lab Test 02/11/20  0703   WBC 7.2   HGB 12.0   MCV 92           Lactic Acid:    No results found for: LACT        Comprehensive Metabolic Panel:  Recent Labs   Lab 02/11/20  0703      POTASSIUM 4.2   CHLORIDE 107   CO2 23   ANIONGAP 5   *   BUN 9   CR 0.55   GFRESTIMATED >90   GFRESTBLACK >90   FRED 8.0*       BNP:  2/12/2020 214      Time Spent on this Encounter   I spent 60 minutes of critical care time on the unit/floor managing the care of Charlene Dia. Upon evaluation, this patient had a high probability of imminent or life-threatening deterioration due to possible acute abdominal process vs ACS with hemodynamic changes, which required my direct attention, intervention, and personal management. 100% of my time was spent at the bedside counseling the patient and/or coordinating care regarding services listed in this note.    GLORY Leslie CNP

## 2020-02-12 NOTE — PROVIDER NOTIFICATION
MD Notification    Notified Person: MD    Notified Person Name: Dr. Jessica Beavers     Notification Date/Time: 2/12/2020, 0325    Notification Interaction: Phone call    Purpose of Notification: Low UO after verde was reinserted. Chest XRAY complete.     Orders Received:

## 2020-02-12 NOTE — CONSULTS
Medication coverage check for Enoxaparin. $112.05 is the copay for this medication for her.    Bessy Landry CphT  [facility] Discharge Pharmacy Liaison  Liaison Cell: [phone]

## 2020-02-12 NOTE — PROVIDER NOTIFICATION
MD Notification    Notified Person: MD    Notified Person Name: Stephany Hanley    Notification Date/Time: 02/12/20 11:06 AM    Notification Interaction: phone call    Purpose of Notification: UA: small amt of leukocytes    Orders Received: No new orders, keep monitoring     Comments:

## 2020-02-12 NOTE — PROGRESS NOTES
"[facility]  Hospitalist Progress Note          Assessment and Plan:     Pelvic mass, elevated CA-125, left hydronephrosis:  POD 2 X-lap, radical resection of left adnexal tumor with TAHBSO, left ureterolysis, omentectomy, left pelvic and para-aortic lymphadenectomy. Will likely chemotherapy in adjuvant setting once path back and recovered from surgery.      -Leukocytosis: BC's and UC pending. Tmax 99.7. Start Zosyn. CT A/p w/o contrast not suspicious for infection. Remain NPO for now. Monitor. Repeat CBC, CMP in am.     -SOO/Oliguria: Creat up to 1.42 from 0.55 today. UOP lower despite 3  boluses and IVF@ 100cc/hr. CT demonstrates unremarkable kidneys and ureters. Avoid nephrotoxic agents. Nephrology consult. Monitor.     -Chest pain: CXR with small amount of bibasilar atelectasis or infiltrate. No significant pleural effusions.  Continue O2 per NC to keep sats >92%. Encourage IS frequently. Ambulate frequently.     DVT Prophylaxis:  Switch from Lovenox to heparin for now due to SOO. Will Rx lovenox on discharge x 28 days.     Epilepsy:  Continue Carbamazepine    Hyperlipidemia:  On Atorvastatin    Disposition:  OT/PT following. Recommend home independently.  Will continue to monitor.              Interval History:   Some pain as expected in abdomen. States its more gas pains. Denies flatus. Currently denies CP or SOB. Alert, aware she is in the hospital.               Medications:   I have reviewed this patient's current medications               Physical Exam:   Blood pressure 125/41, pulse 78, temperature 99.7  F (37.6  C), temperature source Oral, resp. rate 18, height 1.727 m (5' 8\"), weight 77.3 kg (170 lb 6.4 oz), SpO2 94 %.        Vital Sign Ranges  Temperature Temp  Av.2  F (36.2  C)  Min: 96.6  F (35.9  C)  Max: 98.5  F (36.9  C)   Blood pressure Systolic (24hrs), Av , Min:109 , Max:133        Diastolic (24hrs), Av, Min:41, Max:83      Pulse Pulse  Av.1  Min: 55  " Max: 73   Respirations Resp  Av.9  Min: 8  Max: 19   Pulse oximetry SpO2  Av.3 %  Min: 91 %  Max: 100 %           Intake/Output Summary (Last 24 hours) at 2020  Last data filed at 2020  Gross per 24 hour   Intake 512 ml   Output 360 ml   Net 152 ml       Lungs:   Diminished BS throughout     Cardiovascular:   normal apical pulses      Abdomen:    BS+, abdomen moderately distended, tympanitic, no pain to palpation, no gaurding sanguinous drainage on dressing, dressing removed. Incision c/d/i.      Musculoskeletal:   no lower extremity pitting edema present                Data:   All laboratory data reviewed

## 2020-02-12 NOTE — CONSULTS
[facility]    Nephrology Consultation     Date of Admission:  2/10/2020    Assessment & Plan     Charlene Dia is a 59 year old female who was admitted on 2/10/2020.     1) Baseline normal kidney function.    2) L adnexal mass: S/P elective exploratory laparotomy, radical resection of pelvic tumor with ADELE/BSO, left ureterolysis, excision of left cul-de-sac and pelvic peritoneum, omentectomy, and left pelvic and paraaortic lymphadenectomy. This was for L adnexal tumor.      3) SOO:  Obstruction is high on the list of possibilities in this setting, but CT does not suggest this.  FENA suggests that she is dry.  No IV contrast, NSAIDs or other nephrotoxins given.      4) HTN:  She was on losartan which can amplify the effects of volume depletion on the kidney.  This has been stopped.      Plan:    I think she just needs more fluid.  I will repeat the bolus of  mL now.  And I will increase her maintenance IV fluid to 150 mL/hour.    Following.      Ivan Cobos MD  [facility] - Nephrology  [phone]    Reason for Consult     I was asked to see the patient for SOO.      Primary Care Physician     Remington Ramirez    Chief Complaint     Do I need more surgery ?    History is obtained from the patient and chart review.      History of Present Illness     Charlene Dia is a 59 year old female who presents with SOO.    She underwent elective exploratory laparotomy, radical resection of pelvic tumor with ADELE/BSO, left ureterolysis, excision of left cul-de-sac and pelvic peritoneum, omentectomy, and left pelvic and paraaortic lymphadenectomy. This was for L adnexal tumor.      Prior to surgery her cr was normal - 0.55.    Cr up to 1.42 this AM with oliguria.    CT without contrast done shows no hydronephrosis or hydroureter.      FENA is  < 0.1 as U na < 5.    Not SOB.  Still has urine output but only 255 over day shift.         Past Medical History   I have reviewed this patient's  medical history and updated it with pertinent information if needed.   Past Medical History:   Diagnosis Date     Abdominal lump, pelvic mass      Anxiety 2/6/2012     Asthma, intermittent controlled      CARDIOVASCULAR SCREENING; LDL GOAL LESS THAN 130      Chronic neck pain      Chronic tension type headache      Cough      Epilepsy (H)      Epilepsy (H)      Hyperlipidemia      Hypertension      Insomnia      Insomnia      Mental developmental delay      Neurotic excoriations      Ovarian cancer (H)      Pain in both hands        Past Surgical History   I have reviewed this patient's surgical history and updated it with pertinent information if needed.  Past Surgical History:   Procedure Laterality Date     CYSTECTOMY OVARIAN BENIGN  07/07    right     EYE SURGERY      hx cornea transplant     FOOT SURGERY  04/03     HYSTERECTOMY TOTAL ABDOMINAL, BILATERAL SALPINGO-OOPHORECTOMY, NODE DISSECTION, COMBINED N/A 2/10/2020    Procedure: TOTAL ABDOMINAL HYSTERECTOMY,  WITH BILATERAL SALPINGO-OOPHORECTOMY, TUMOR DEBULKING, OMENTECTOMY, PELVIC PARA  AORTIC LYMPHADENECTOMY, LEFT URETERAL LYSIS;  Surgeon: Paula Bryson MD;  Location:  OR     KERATOPLASTY (LAMELLAR GRAFT)  '94, '96, '00, '02     LAPAROTOMY, STAGING, COMBINED N/A 2/10/2020    Procedure: EXPLORATORY LAPAROTOMY;  Surgeon: Paula Bryson MD;  Location:  OR       Prior to Admission Medications   Prior to Admission Medications   Prescriptions Last Dose Informant Patient Reported? Taking?   MELATONIN PO 2/9/2020 Self Yes Yes   Sig: Take 10-15 mg by mouth every evening   atorvastatin (LIPITOR) 80 MG tablet 2/8/2020 at pm Self Yes Yes   Sig: Take 80 mg by mouth At Bedtime   carbamazepine (TEGRETOL XR) 100 MG 12 hr tablet 2/10/2020 at 0800 Self Yes Yes   Sig: Take 400 mg by mouth 2 times daily (Takes 4 x 100mg = 400mg)   cyclobenzaprine (FLEXERIL) 5 MG tablet more than a week at prn Self Yes Yes   Sig: Take 5 mg by mouth nightly as needed for  muscle spasms   losartan (COZAAR) 50 MG tablet 2/8/2020 at am Self Yes Yes   Sig: Take 50 mg by mouth daily   naphazoline-pheniramine (OPCON-A/VISINE A/NAPHCON A) SOLN ophthalmic solution 2/10/2020 at prn Self Yes Yes   Sig: Place 1-2 drops into both eyes 4 times daily as needed for irritation   sodium chloride (OCEAN) 0.65 % nasal spray Past Week at prn Self Yes Yes   Sig: Spray 1 spray into both nostrils every morning   timolol maleate (TIMOPTIC) 0.5 % ophthalmic solution 2/9/2020 at am Self Yes Yes   Sig: Place 1 drop into both eyes every morning      Facility-Administered Medications: None     Allergies   No Known Allergies    Social History   I have reviewed this patient's social history and updated it with pertinent information if needed. Charlene Dia  reports that she has never smoked. She has never used smokeless tobacco. She reports that she does not drink alcohol or use drugs.    Family History   I have reviewed this patient's family history and updated it with pertinent information if needed.   Family History   Problem Relation Age of Onset     Asthma Mother      Arthritis Mother      Heart Disease Mother      Diabetes Father      Alcohol/Drug Father      Heart Disease Father      Heart Disease Sister        Review of Systems   The 10 point Review of Systems is negative other than noted in the HPI.     Physical Exam   Temp: 99.2  F (37.3  C) Temp src: Oral BP: 112/55 Pulse: 78 Heart Rate: 92 Resp: 16 SpO2: 96 % O2 Device: None (Room air) Oxygen Delivery: 1 LPM  Vital Signs with Ranges  Temp:  [97.7  F (36.5  C)-99.7  F (37.6  C)] 99.2  F (37.3  C)  Pulse:  [78] 78  Heart Rate:  [] 92  Resp:  [14-21] 16  BP: ()/(33-62) 112/55  SpO2:  [90 %-96 %] 96 %  170 lbs 6.4 oz    GENERAL:  alert, NAD  HEENT:  Normocephalic. No gross abnormalities.  Pupils equal.  MMM.  Dentition is ok.  CV: RRR, no murmurs, no clicks, gallops, or rubs, no edema, no carotid bruits  RESP: Clear bilaterally with good  efforts  GI: Abdomen soft/nt/nd, BS present. No masses, organomegaly  MUSCULOSKELETAL: extremities nl - no gross deformities noted  SKIN: no suspicious lesions or rashes, dry to touch  NEURO:  Moves all extremities with purpose.    PSYCH: mood good, affect appropriate  LYMPH: No palpable ant/post cervical and supraclavicular adenopathy    Data   BMP  Recent Labs   Lab 02/12/20  0606 02/11/20  0703 02/10/20  2151 02/10/20  1324   * 135  --   --    POTASSIUM 4.5 4.2  --  3.8   CHLORIDE 104 107  --   --    FRED 8.1* 8.0*  --   --    CO2 24 23  --   --    BUN 17 9  --   --    CR 1.42* 0.55 0.56  --    * 149*  --   --      Phos@[email or web address](phos:4)  CBC)  Recent Labs   Lab 02/12/20  0606 02/11/20  0703 02/10/20  2151   WBC 19.0* 7.2  --    HGB 11.1* 12.0  --    HCT 33.8* 36.6  --    MCV 92 92  --     409 363     Recent Labs   Lab 02/12/20  0606   AST 9   ALT 17   ALKPHOS 85   BILITOTAL 0.4     No lab results found in last 7 days.  No results found for: D2VIT, D3VIT, DTOT  Recent Labs   Lab 02/12/20  0606   HGB 11.1*   HCT 33.8*   MCV 92     No results for input(s): PTHI in the last 168 hours.

## 2020-02-12 NOTE — PROVIDER NOTIFICATION
MD Notification    Notified Person: MD    Notified Person Name: BIJU Hanley     Notification Date/Time: 2045 2/11/20    Notification Interaction: Telephone    Purpose of Notification: BP 99/35 after fluid bolus, urine output 50 ml since 1 PM.    Orders Received: BMP in AM. Recheck BP in an hour if diastolic BP <40 give another 500 ml NS fluid bolus. Continue to monitor for symptoms.     Comments: New order parameters for losartan hold if systolic <110 or diastolic <80

## 2020-02-12 NOTE — PROVIDER NOTIFICATION
MD Notification    Notified Person: MD    Notified Person Name:    Notification Date/Time: 0140    Notification Interaction: Phone call    Purpose of Notification: Pt having low UO, pulled out Perez, & low oxygen sats. Please advise.     Orders Received: Order portable chest Xray & replace Perez.

## 2020-02-13 ENCOUNTER — APPOINTMENT (OUTPATIENT)
Dept: CT IMAGING | Facility: CLINIC | Age: 60
DRG: 737 | End: 2020-02-13
Attending: NURSE PRACTITIONER
Payer: MEDICARE

## 2020-02-13 ENCOUNTER — APPOINTMENT (OUTPATIENT)
Dept: PHYSICAL THERAPY | Facility: CLINIC | Age: 60
DRG: 737 | End: 2020-02-13
Attending: OBSTETRICS & GYNECOLOGY
Payer: MEDICARE

## 2020-02-13 LAB
ALBUMIN SERPL-MCNC: 2.3 G/DL (ref 3.4–5)
ALP SERPL-CCNC: 101 U/L (ref 40–150)
ALT SERPL W P-5'-P-CCNC: 20 U/L (ref 0–50)
ANION GAP SERPL CALCULATED.3IONS-SCNC: 5 MMOL/L (ref 3–14)
AST SERPL W P-5'-P-CCNC: 19 U/L (ref 0–45)
BASOPHILS # BLD AUTO: 0 10E9/L (ref 0–0.2)
BASOPHILS NFR BLD AUTO: 0 %
BILIRUB SERPL-MCNC: 0.4 MG/DL (ref 0.2–1.3)
BUN SERPL-MCNC: 15 MG/DL (ref 7–30)
CALCIUM SERPL-MCNC: 8.1 MG/DL (ref 8.5–10.1)
CHLORIDE SERPL-SCNC: 106 MMOL/L (ref 94–109)
CO2 SERPL-SCNC: 22 MMOL/L (ref 20–32)
CREAT SERPL-MCNC: 1 MG/DL (ref 0.52–1.04)
DIFFERENTIAL METHOD BLD: ABNORMAL
EOSINOPHIL # BLD AUTO: 0 10E9/L (ref 0–0.7)
EOSINOPHIL NFR BLD AUTO: 0 %
ERYTHROCYTE [DISTWIDTH] IN BLOOD BY AUTOMATED COUNT: 13.8 % (ref 10–15)
GFR SERPL CREATININE-BSD FRML MDRD: 61 ML/MIN/{1.73_M2}
GLUCOSE SERPL-MCNC: 143 MG/DL (ref 70–99)
HCT VFR BLD AUTO: 28.8 % (ref 35–47)
HGB BLD-MCNC: 9.4 G/DL (ref 11.7–15.7)
IMM GRANULOCYTES # BLD: 0.1 10E9/L (ref 0–0.4)
IMM GRANULOCYTES NFR BLD: 0.3 %
INTERPRETATION ECG - MUSE: NORMAL
LYMPHOCYTES # BLD AUTO: 0.7 10E9/L (ref 0.8–5.3)
LYMPHOCYTES NFR BLD AUTO: 3.8 %
MCH RBC QN AUTO: 29.9 PG (ref 26.5–33)
MCHC RBC AUTO-ENTMCNC: 32.6 G/DL (ref 31.5–36.5)
MCV RBC AUTO: 92 FL (ref 78–100)
MONOCYTES # BLD AUTO: 1.1 10E9/L (ref 0–1.3)
MONOCYTES NFR BLD AUTO: 6 %
NEUTROPHILS # BLD AUTO: 16.5 10E9/L (ref 1.6–8.3)
NEUTROPHILS NFR BLD AUTO: 89.9 %
NRBC # BLD AUTO: 0 10*3/UL
NRBC BLD AUTO-RTO: 0 /100
PLATELET # BLD AUTO: 335 10E9/L (ref 150–450)
POTASSIUM SERPL-SCNC: 4.5 MMOL/L (ref 3.4–5.3)
PROT SERPL-MCNC: 5.2 G/DL (ref 6.8–8.8)
RBC # BLD AUTO: 3.14 10E12/L (ref 3.8–5.2)
SODIUM SERPL-SCNC: 133 MMOL/L (ref 133–144)
WBC # BLD AUTO: 18.3 10E9/L (ref 4–11)

## 2020-02-13 PROCEDURE — 85025 COMPLETE CBC W/AUTO DIFF WBC: CPT | Performed by: NURSE PRACTITIONER

## 2020-02-13 PROCEDURE — 71275 CT ANGIOGRAPHY CHEST: CPT

## 2020-02-13 PROCEDURE — 36415 COLL VENOUS BLD VENIPUNCTURE: CPT | Performed by: NURSE PRACTITIONER

## 2020-02-13 PROCEDURE — 25000132 ZZH RX MED GY IP 250 OP 250 PS 637: Mod: GY | Performed by: HOSPITALIST

## 2020-02-13 PROCEDURE — 25000132 ZZH RX MED GY IP 250 OP 250 PS 637: Mod: GY | Performed by: OBSTETRICS & GYNECOLOGY

## 2020-02-13 PROCEDURE — 97116 GAIT TRAINING THERAPY: CPT | Mod: GP

## 2020-02-13 PROCEDURE — 25000132 ZZH RX MED GY IP 250 OP 250 PS 637: Mod: GY | Performed by: NURSE PRACTITIONER

## 2020-02-13 PROCEDURE — 25000128 H RX IP 250 OP 636: Performed by: OBSTETRICS & GYNECOLOGY

## 2020-02-13 PROCEDURE — 97161 PT EVAL LOW COMPLEX 20 MIN: CPT | Mod: GP

## 2020-02-13 PROCEDURE — P9041 ALBUMIN (HUMAN),5%, 50ML: HCPCS | Performed by: OBSTETRICS & GYNECOLOGY

## 2020-02-13 PROCEDURE — 97530 THERAPEUTIC ACTIVITIES: CPT | Mod: GP

## 2020-02-13 PROCEDURE — 25800030 ZZH RX IP 258 OP 636: Performed by: INTERNAL MEDICINE

## 2020-02-13 PROCEDURE — 25000128 H RX IP 250 OP 636: Performed by: NURSE PRACTITIONER

## 2020-02-13 PROCEDURE — 12000000 ZZH R&B MED SURG/OB

## 2020-02-13 PROCEDURE — 25000125 ZZHC RX 250: Performed by: OBSTETRICS & GYNECOLOGY

## 2020-02-13 PROCEDURE — 80053 COMPREHEN METABOLIC PANEL: CPT | Performed by: NURSE PRACTITIONER

## 2020-02-13 PROCEDURE — 85049 AUTOMATED PLATELET COUNT: CPT | Performed by: NURSE PRACTITIONER

## 2020-02-13 RX ORDER — IOPAMIDOL 755 MG/ML
70 INJECTION, SOLUTION INTRAVASCULAR ONCE
Status: COMPLETED | OUTPATIENT
Start: 2020-02-13 | End: 2020-02-13

## 2020-02-13 RX ADMIN — POTASSIUM CHLORIDE, DEXTROSE MONOHYDRATE AND SODIUM CHLORIDE: 150; 5; 450 INJECTION, SOLUTION INTRAVENOUS at 18:35

## 2020-02-13 RX ADMIN — HEPARIN SODIUM 5000 UNITS: 5000 INJECTION, SOLUTION INTRAVENOUS; SUBCUTANEOUS at 08:37

## 2020-02-13 RX ADMIN — PIPERACILLIN AND TAZOBACTAM 3.38 G: 3; .375 INJECTION, POWDER, FOR SOLUTION INTRAVENOUS at 14:14

## 2020-02-13 RX ADMIN — CARBAMAZEPINE 400 MG: 200 CAPSULE, EXTENDED RELEASE ORAL at 08:33

## 2020-02-13 RX ADMIN — PIPERACILLIN AND TAZOBACTAM 3.38 G: 3; .375 INJECTION, POWDER, FOR SOLUTION INTRAVENOUS at 08:33

## 2020-02-13 RX ADMIN — POTASSIUM CHLORIDE, DEXTROSE MONOHYDRATE AND SODIUM CHLORIDE: 150; 5; 450 INJECTION, SOLUTION INTRAVENOUS at 10:53

## 2020-02-13 RX ADMIN — ALBUMIN HUMAN 25 G: 0.05 INJECTION, SOLUTION INTRAVENOUS at 00:24

## 2020-02-13 RX ADMIN — PIPERACILLIN AND TAZOBACTAM 3.38 G: 3; .375 INJECTION, POWDER, FOR SOLUTION INTRAVENOUS at 01:26

## 2020-02-13 RX ADMIN — POTASSIUM CHLORIDE, DEXTROSE MONOHYDRATE AND SODIUM CHLORIDE: 150; 5; 450 INJECTION, SOLUTION INTRAVENOUS at 04:04

## 2020-02-13 RX ADMIN — PIPERACILLIN AND TAZOBACTAM 3.38 G: 3; .375 INJECTION, POWDER, FOR SOLUTION INTRAVENOUS at 20:05

## 2020-02-13 RX ADMIN — MELATONIN 5 MG TABLET 10 MG: at 00:25

## 2020-02-13 RX ADMIN — ONDANSETRON 4 MG: 2 INJECTION INTRAMUSCULAR; INTRAVENOUS at 08:45

## 2020-02-13 RX ADMIN — HEPARIN SODIUM 5000 UNITS: 5000 INJECTION, SOLUTION INTRAVENOUS; SUBCUTANEOUS at 01:25

## 2020-02-13 RX ADMIN — Medication: at 12:17

## 2020-02-13 RX ADMIN — CARBAMAZEPINE 400 MG: 200 CAPSULE, EXTENDED RELEASE ORAL at 20:05

## 2020-02-13 RX ADMIN — Medication 1 MG: at 21:58

## 2020-02-13 RX ADMIN — IOPAMIDOL 70 ML: 755 INJECTION, SOLUTION INTRAVENOUS at 13:36

## 2020-02-13 RX ADMIN — ATORVASTATIN CALCIUM 80 MG: 80 TABLET, FILM COATED ORAL at 21:58

## 2020-02-13 RX ADMIN — HEPARIN SODIUM 5000 UNITS: 5000 INJECTION, SOLUTION INTRAVENOUS; SUBCUTANEOUS at 17:30

## 2020-02-13 RX ADMIN — SODIUM CHLORIDE 94 ML: 9 INJECTION, SOLUTION INTRAVENOUS at 13:36

## 2020-02-13 ASSESSMENT — MIFFLIN-ST. JEOR: SCORE: 1466.26

## 2020-02-13 ASSESSMENT — ACTIVITIES OF DAILY LIVING (ADL)
ADLS_ACUITY_SCORE: 14
ADLS_ACUITY_SCORE: 15
ADLS_ACUITY_SCORE: 14
ADLS_ACUITY_SCORE: 14

## 2020-02-13 NOTE — PLAN OF CARE
POD3. A&Ox4. VSS on 1L NC. No c/o of pain, N/V. PCA dilaudid available. Abd binder in place. Midline incision with staples, KAMLA, CDI. Abd distended, hypo BS, - gas. NPO since yesterday. Perez in place with 300 mL output. Albumin given last night. PIV infusing D5+45%NS+KCl 20 meq at 150ml/hr. Up A1 GB and walker. Discharge pending.

## 2020-02-13 NOTE — PROGRESS NOTES
Prerenal SOO has resolved.  She had IV contrast today to evaluate chest pain/hypoxia.  If cr bumps in next 1-2 days likely contrast injury, but hopefully tolerated OK.    I will sign off.    Call with ?    Thanks.    Ivan Cobos MD

## 2020-02-13 NOTE — PLAN OF CARE
Discharge Planner PT   Patient plan for discharge: TCU  Current status: PT orders received, eval completed, treatment initiated. Pt is 59 y.o. F POD # 3 exploratory laparotomy, radical resection of tumor with ADELE/BSO. Pt was assessed by OT 2/11 & at that time ambulated 300' in ames progressing to IND level & OT/PT signed off. Per communication with RN today, pt has been ambulating with support of IV pole and SBA-Rashid, needing cues for navigation in ames at times & PT assessment is warranted.     Pt lives alone in apartment with no steps, does have to walk to another building for laundry. Previously independence with all mobility, cares and IADLs including driving.     Currently, pt received supine in bed, agreeable to PT. Rates pain 5-6/10. Edu on role of PT, POC, reviewed abdominal precautions. Supine>sit form flat bed without rail with SBA and extra time. Needs cues for log roll technique & SBA with sit>supine. Sit<>stand without AD with close SBA, pt needs extra time. Sit<>stand with FWW and SBA with cues for arm placement & extra time. Ambulated 2 x 140' and SBA-CGA. 1st trial with no AD; pt with short shuffling steps with no clearance off floor, unsteady with 1 LOB -recovered with CGA for safety. 2nd trial with FWW and SBA-CGA with slightly improved foot clearance & steadiness though has 1 LOB when turning head R, recovered with CGA for safety. Discussed rec for continued rehab in TCU, pt agreeable. Pt supine in bed upon departure, all needs in reach.    Barriers to return to prior living situation: lives alone, impaired balance & fall risk, pain  Recommendations for discharge: TCU  Rationale for recommendations: Patient is below baseline mobility, needing assist with all mobility & unsteady ambulating LOB noted with and without AD. Pt will benefit from continued skilled therapy in TCU to progress safety and independence with mobility prior to returning to independent apartment.       Entered by: Kajal White  02/13/2020 4:59 PM

## 2020-02-13 NOTE — PROVIDER NOTIFICATION
MD Notification    Notified Person: MD    Notified Person Name: Jessica    Notification Date/Time: 02/12/20; 8:28 PM    Notification Interaction: Phone page    Purpose of Notification: Pt NPO, temp is 100.7, can we order suppository tylenol? Low UOP, received bolus earlier this evening, IV fluids running at 150ml/hr    Orders Received: Okay to give oral tylenol, continue with the IVF at 150ml/hr, and monitor.     Comments:

## 2020-02-13 NOTE — PROGRESS NOTES
POD3. Pt is A&Ox4. VSS on 1L NC. C/o pain to abd, PCA pump in use. Nausea + 1 episode of emesis this AM, IV zofran effective. Hypo BS, not passing gas, abd distended. Kept on NPO status for today. C/o rib pain-CT done today> negative for PE. Midline incision w/ staples-KAMLA, w/ abd binder in place. Perez in place w/ adequate UOP. IVF + intermittent abx. Up assist x1 + GB. Ambulating in hallway, sat up in chair for most of day and felt better. Discharge pending bowel function.

## 2020-02-13 NOTE — PLAN OF CARE
POD2, AOX4, VSS ex soft BP, on RA; tmax 100.7, tylenol given. C/o pressure pain to abd 4/10, PCA (0.2mg dilaudid) in use. Hypo BS, not passing gas. NPO since this morning, Abd distended. Midline incision staples, KAMLA, CDI. CT of abd/pelvis done today-unremarkable. Perez in place with 150ml out, MD aware. Received 500ml additional bolus this evening. Albumin ordered, will receive tonight.  PIV infusing D5+45%NS+KCl 20 meq at 150ml/hr. UA with small amounts WBCs, receiving zosyn. Lactate: 1.0 Up A1 GB/W, ambulated in halls. Continue to monitor.

## 2020-02-13 NOTE — PROGRESS NOTES
02/13/20 1630   Quick Adds   Type of Visit Initial PT Evaluation   Living Environment   Lives With alone   Living Arrangements apartment   Home Accessibility other (see comments)  (has to walk to other building for laundry facilities)   Transportation Anticipated car, drives self;family or friend will provide   Living Environment Comment Pt has walk in shower with grab bars & build in corner seat, toilet with grab bars   Self-Care   Usual Activity Tolerance good   Current Activity Tolerance moderate   Equipment Currently Used at Home none   Functional Level Prior   Ambulation 0-->independent   Transferring 0-->independent   Toileting 0-->independent   Bathing 0-->independent   Fall history within last six months no   Prior Functional Level Comment Pt previously ind with all mobility, ADLs & IADLs including driving   General Information   Onset of Illness/Injury or Date of Surgery - Date 02/10/20   Referring Physician Paula Bryson MD   Patient/Family Goals Statement to work on walking- get back to normal   Pertinent History of Current Problem (include personal factors and/or comorbidities that impact the POC)  Pt is 59 y.o. F POD # 3 exploratory laparotomy, radical resection of tumor with ADELE/BSO. Pt was assessed by OT 2/11 & at that time ambulated 300' in ames progressing to IND level & OT/PT signed off. Pt did have RRT 2/12 for chest pain & confusion, BP decreased to 105/35 during RRT & SOO noted- therefore bolus given.  Per communication with RN today, pt has been ambulating with support of IV pole and SBA-Rashid, needing cues for navigation in ames at times & PT assessment is warranted.    Precautions/Limitations fall precautions;abdominal precautions   General Info Comments Activity: Up with assist   Cognitive Status Examination   Orientation orientation to person, place and time   Level of Consciousness alert   Follows Commands and Answers Questions 100% of the time;able to follow multistep  instructions   Personal Safety and Judgment intact   Pain Assessment   Patient Currently in Pain Yes, see Vital Sign flowsheet  (5-6/10)   Integumentary/Edema   Integumentary/Edema Comments abdominal dressing not visualized   Posture    Posture Forward head position;Protracted shoulders   Range of Motion (ROM)   ROM Comment LE & UE ROM grossly WFL with bed mobility & transfers   Strength   Strength Comments Pt demos some functional proximal weakness with bed mobility & transfers- needs extra time for these movements   Bed Mobility   Bed Mobility Comments Supine>sit form flat bed without rail with SBA and extra time   Transfer Skills   Transfer Comments Sit<>stand without AD with close SBA, pt needs extra time.   Gait   Gait Comments Ambulated with IV pole support and without AD with shuffing short steps with no clearance off floor, some unsteadiness and SBA-CGA.   Balance   Balance Comments Needs CGA for safety with dynamic balance without AD   Sensory Examination   Sensory Perception Comments denies numbness/tingling   Modality Interventions   Planned Modality Interventions Cryotherapy   General Therapy Interventions   Planned Therapy Interventions balance training;bed mobility training;gait training;neuromuscular re-education;strengthening;transfer training;home program guidelines;progressive activity/exercise   Clinical Impression   Criteria for Skilled Therapeutic Intervention yes, treatment indicated   PT Diagnosis difficulty ambulating   Influenced by the following impairments impaired balance, pain, decreased strength, decrease activity tolerance   Functional limitations due to impairments difficulty with bed mobility, transfers, ambulation   Clinical Presentation Stable/Uncomplicated   Clinical Presentation Rationale clinical judgment   Clinical Decision Making (Complexity) Low complexity   Therapy Frequency 5x/week   Predicted Duration of Therapy Intervention (days/wks) 3 days   Anticipated Equipment Needs  "at Discharge   (none if TCU; if home, FWW)   Anticipated Discharge Disposition Transitional Care Facility   Risk & Benefits of therapy have been explained Yes   Patient, Family & other staff in agreement with plan of care Yes   Clinical Impression Comments Patient is below baseline mobility, needing assist with all mobility & unsteady ambulating LOB noted with and without AD. Pt will benefit from continued skilled therapy in TCU to progress safety and independence with mobility prior to returning to independent apartment.   [facility] AM-PAC TM \"6 Clicks\"   2016, Trustees of [facility], under license to [employer].  All rights reserved.   6 Clicks Short Forms Basic Mobility Inpatient Short Form   [facility] AM-PAC  \"6 Clicks\" V.2 Basic Mobility Inpatient Short Form   1. Turning from your back to your side while in a flat bed without using bedrails? 3 - A Little   2. Moving from lying on your back to sitting on the side of a flat bed without using bedrails? 3 - A Little   3. Moving to and from a bed to a chair (including a wheelchair)? 3 - A Little   4. Standing up from a chair using your arms (e.g., wheelchair, or bedside chair)? 3 - A Little   5. To walk in hospital room? 3 - A Little   6. Climbing 3-5 steps with a railing? 3 - A Little   Basic Mobility Raw Score (Score out of 24.Lower scores equate to lower levels of function) 18   Total Evaluation Time   Total Evaluation Time (Minutes) 10     "

## 2020-02-13 NOTE — PROGRESS NOTES
[facility]  Hospitalist Progress Note          Assessment and Plan:     Pelvic mass, elevated CA-125, left hydronephrosis:  POD 3 X-lap, radical resection of left adnexal tumor with TAHBSO, left ureterolysis, omentectomy, left pelvic and para-aortic lymphadenectomy. 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 Will need chemotherapy in adjuvant setting once recovered from surgery.  Will need port placed. Either upon discharge or in outpatient setting.   Remove verde tomorrow.    -Leukocytosis: BC's and UC NGTD. Tmax 100.7. On Zosyn. CT A/p w/o contrast not suspicious for infection. Remain NPO for now. Monitor. Repeat CBC, CMP in am.     -SOO/Oliguria: Creat improved.  UOP improved. CT demonstrates unremarkable kidneys and ureters. Avoid nephrotoxic agents. Appreciate Nephrology consult. Monitor.     -Chest pain/low O2 sats: CXR with small amount of bibasilar atelectasis or infiltrate. No significant pleural effusions. Check CT chest to r/o PE. Continue O2 per NC to keep sats >92%. Encourage IS frequently. Ambulate frequently.     -Ileus: remain NPO for now. Monitor. Keep PCA.     DVT Prophylaxis:  Continue on heparin for now due to SOO. Will Rx lovenox on discharge x 28 days. Will need teaching prior to discharge.     Epilepsy:  Continue Carbamazepine    Hyperlipidemia:  On Atorvastatin    HTN: Losartan on hold    Disposition:  OT/PT/SW following. Patient ok with TCU.     Discussed with RN. Appreciate cares.     Patient seen and examined.  Abdomen remains distended and tympanetic with minimal BS c/w ileus.  Discussed pathology and need for adjuvant chemotherapy.  Will have further discussion with family (Brother's) once I'm back next week on Tuesday or as an outpatient for treatment planning if she is discharged earlier.      LETTY Bryson MD             Interval History:   Some pain as expected in abdomen. No gas. Belching.  Currently denies CP or SOB. Alert, aware she is in the  "hospital.               Medications:   I have reviewed this patient's current medications               Physical Exam:   Blood pressure 112/42, pulse 81, temperature 99  F (37.2  C), temperature source Oral, resp. rate 16, height 1.727 m (5' 8\"), weight 85.3 kg (188 lb), SpO2 93 %.        Vital Sign Ranges  Temperature Temp  Av.2  F (36.2  C)  Min: 96.6  F (35.9  C)  Max: 98.5  F (36.9  C)   Blood pressure Systolic (24hrs), Av , Min:109 , Max:133        Diastolic (24hrs), Av, Min:41, Max:83      Pulse Pulse  Av.1  Min: 55  Max: 73   Respirations Resp  Av.9  Min: 8  Max: 19   Pulse oximetry SpO2  Av.3 %  Min: 91 %  Max: 100 %           Intake/Output Summary (Last 24 hours) at 2020 1116  Last data filed at 2020 1053  Gross per 24 hour   Intake 2087 ml   Output 875 ml   Net 1212 ml       Lungs:   Diminished BS throughout     Cardiovascular:   normal apical pulses      Abdomen:    BS scarce, abdomen moderately distended, tympanitic, no pain to palpation, no guarding. Incision c/d/i.      Musculoskeletal:   no lower extremity pitting edema present                Data:   All laboratory data reviewed  "

## 2020-02-13 NOTE — PROVIDER NOTIFICATION
MD Notification    Notified Person: MD    Notified Person Name:Jessica    Notification Date/Time: 02/12/20; 9:57 PM      Notification Interaction: Phone page    Purpose of Notification: Low UOP, 150ml in 7 hours.     Orders Received: Administer albumin 5%, 25g. Order CBC with platelets.     Comments:

## 2020-02-14 ENCOUNTER — APPOINTMENT (OUTPATIENT)
Dept: PHYSICAL THERAPY | Facility: CLINIC | Age: 60
DRG: 737 | End: 2020-02-14
Attending: OBSTETRICS & GYNECOLOGY
Payer: MEDICARE

## 2020-02-14 LAB
ALBUMIN SERPL-MCNC: 2 G/DL (ref 3.4–5)
ALP SERPL-CCNC: 155 U/L (ref 40–150)
ALT SERPL W P-5'-P-CCNC: 64 U/L (ref 0–50)
ANION GAP SERPL CALCULATED.3IONS-SCNC: 3 MMOL/L (ref 3–14)
AST SERPL W P-5'-P-CCNC: 83 U/L (ref 0–45)
BASOPHILS # BLD AUTO: 0 10E9/L (ref 0–0.2)
BASOPHILS NFR BLD AUTO: 0 %
BILIRUB SERPL-MCNC: 0.4 MG/DL (ref 0.2–1.3)
BUN SERPL-MCNC: 9 MG/DL (ref 7–30)
CALCIUM SERPL-MCNC: 8.2 MG/DL (ref 8.5–10.1)
CHLORIDE SERPL-SCNC: 108 MMOL/L (ref 94–109)
CO2 SERPL-SCNC: 24 MMOL/L (ref 20–32)
CREAT SERPL-MCNC: 0.78 MG/DL (ref 0.52–1.04)
DIFFERENTIAL METHOD BLD: ABNORMAL
EOSINOPHIL # BLD AUTO: 0 10E9/L (ref 0–0.7)
EOSINOPHIL NFR BLD AUTO: 0 %
ERYTHROCYTE [DISTWIDTH] IN BLOOD BY AUTOMATED COUNT: 13.5 % (ref 10–15)
GFR SERPL CREATININE-BSD FRML MDRD: 81 ML/MIN/{1.73_M2}
GLUCOSE SERPL-MCNC: 121 MG/DL (ref 70–99)
HCT VFR BLD AUTO: 25.7 % (ref 35–47)
HGB BLD-MCNC: 8.5 G/DL (ref 11.7–15.7)
IMM GRANULOCYTES # BLD: 0 10E9/L (ref 0–0.4)
IMM GRANULOCYTES NFR BLD: 0.3 %
LACTATE BLD-SCNC: 0.8 MMOL/L (ref 0.7–2)
LYMPHOCYTES # BLD AUTO: 0.9 10E9/L (ref 0.8–5.3)
LYMPHOCYTES NFR BLD AUTO: 8.9 %
MCH RBC QN AUTO: 30.5 PG (ref 26.5–33)
MCHC RBC AUTO-ENTMCNC: 33.1 G/DL (ref 31.5–36.5)
MCV RBC AUTO: 92 FL (ref 78–100)
MONOCYTES # BLD AUTO: 0.8 10E9/L (ref 0–1.3)
MONOCYTES NFR BLD AUTO: 7.5 %
NEUTROPHILS # BLD AUTO: 8.6 10E9/L (ref 1.6–8.3)
NEUTROPHILS NFR BLD AUTO: 83.3 %
NRBC # BLD AUTO: 0 10*3/UL
NRBC BLD AUTO-RTO: 0 /100
PLATELET # BLD AUTO: 368 10E9/L (ref 150–450)
POTASSIUM SERPL-SCNC: 4.3 MMOL/L (ref 3.4–5.3)
PROT SERPL-MCNC: 4.9 G/DL (ref 6.8–8.8)
RBC # BLD AUTO: 2.79 10E12/L (ref 3.8–5.2)
SODIUM SERPL-SCNC: 135 MMOL/L (ref 133–144)
WBC # BLD AUTO: 10.3 10E9/L (ref 4–11)

## 2020-02-14 PROCEDURE — 97116 GAIT TRAINING THERAPY: CPT | Mod: GP

## 2020-02-14 PROCEDURE — 36415 COLL VENOUS BLD VENIPUNCTURE: CPT | Performed by: OBSTETRICS & GYNECOLOGY

## 2020-02-14 PROCEDURE — 25800030 ZZH RX IP 258 OP 636: Performed by: INTERNAL MEDICINE

## 2020-02-14 PROCEDURE — 12000000 ZZH R&B MED SURG/OB

## 2020-02-14 PROCEDURE — 80053 COMPREHEN METABOLIC PANEL: CPT | Performed by: NURSE PRACTITIONER

## 2020-02-14 PROCEDURE — 36415 COLL VENOUS BLD VENIPUNCTURE: CPT | Performed by: NURSE PRACTITIONER

## 2020-02-14 PROCEDURE — 83605 ASSAY OF LACTIC ACID: CPT | Performed by: OBSTETRICS & GYNECOLOGY

## 2020-02-14 PROCEDURE — 25000128 H RX IP 250 OP 636: Performed by: NURSE PRACTITIONER

## 2020-02-14 PROCEDURE — 25000132 ZZH RX MED GY IP 250 OP 250 PS 637: Mod: GY | Performed by: OBSTETRICS & GYNECOLOGY

## 2020-02-14 PROCEDURE — 97530 THERAPEUTIC ACTIVITIES: CPT | Mod: GP

## 2020-02-14 PROCEDURE — 85025 COMPLETE CBC W/AUTO DIFF WBC: CPT | Performed by: NURSE PRACTITIONER

## 2020-02-14 PROCEDURE — 25000132 ZZH RX MED GY IP 250 OP 250 PS 637: Mod: GY | Performed by: NURSE PRACTITIONER

## 2020-02-14 RX ADMIN — HEPARIN SODIUM 5000 UNITS: 5000 INJECTION, SOLUTION INTRAVENOUS; SUBCUTANEOUS at 08:41

## 2020-02-14 RX ADMIN — HEPARIN SODIUM 5000 UNITS: 5000 INJECTION, SOLUTION INTRAVENOUS; SUBCUTANEOUS at 00:44

## 2020-02-14 RX ADMIN — POTASSIUM CHLORIDE, DEXTROSE MONOHYDRATE AND SODIUM CHLORIDE: 150; 5; 450 INJECTION, SOLUTION INTRAVENOUS at 04:57

## 2020-02-14 RX ADMIN — POTASSIUM CHLORIDE, DEXTROSE MONOHYDRATE AND SODIUM CHLORIDE: 150; 5; 450 INJECTION, SOLUTION INTRAVENOUS at 14:20

## 2020-02-14 RX ADMIN — Medication 1 ML: at 00:44

## 2020-02-14 RX ADMIN — HEPARIN SODIUM 5000 UNITS: 5000 INJECTION, SOLUTION INTRAVENOUS; SUBCUTANEOUS at 17:01

## 2020-02-14 RX ADMIN — CARBAMAZEPINE 400 MG: 200 CAPSULE, EXTENDED RELEASE ORAL at 21:25

## 2020-02-14 RX ADMIN — CARBAMAZEPINE 400 MG: 200 CAPSULE, EXTENDED RELEASE ORAL at 08:22

## 2020-02-14 RX ADMIN — PIPERACILLIN AND TAZOBACTAM 3.38 G: 3; .375 INJECTION, POWDER, FOR SOLUTION INTRAVENOUS at 19:42

## 2020-02-14 RX ADMIN — ATORVASTATIN CALCIUM 80 MG: 80 TABLET, FILM COATED ORAL at 21:25

## 2020-02-14 RX ADMIN — PIPERACILLIN AND TAZOBACTAM 3.38 G: 3; .375 INJECTION, POWDER, FOR SOLUTION INTRAVENOUS at 01:03

## 2020-02-14 RX ADMIN — PIPERACILLIN AND TAZOBACTAM 3.38 G: 3; .375 INJECTION, POWDER, FOR SOLUTION INTRAVENOUS at 08:22

## 2020-02-14 RX ADMIN — ONDANSETRON 4 MG: 4 TABLET, ORALLY DISINTEGRATING ORAL at 08:22

## 2020-02-14 RX ADMIN — PIPERACILLIN AND TAZOBACTAM 3.38 G: 3; .375 INJECTION, POWDER, FOR SOLUTION INTRAVENOUS at 13:23

## 2020-02-14 ASSESSMENT — ACTIVITIES OF DAILY LIVING (ADL)
ADLS_ACUITY_SCORE: 13
ADLS_ACUITY_SCORE: 14
ADLS_ACUITY_SCORE: 15

## 2020-02-14 ASSESSMENT — MIFFLIN-ST. JEOR: SCORE: 1437.69

## 2020-02-14 NOTE — PLAN OF CARE
POD 4. A&Ox4. VSS on RA, ex tachy and on 1L. C/o of pain to abd, ice pack in place with relief. PCA pump in use. Abd binder in place. Incisions KAMLA, staples for closure, no drainage. Abd not as distended today. Perez in place, 725 mL of output this shift, yellow in color. Denies N/V. Hypo BS, minimal gas. NPO in case of potential ileus. IV infusing with int abx. Discharge pending bowel functions.

## 2020-02-14 NOTE — PLAN OF CARE
Discharge Planner PT   Patient plan for discharge: TCU  Current status: Pt was received supine in bed and agreed to PT interventions. Pt is edu on abdominal precautions and the importance of abd binder for comfort. Pt is also edu on log rolling and required CGA with supine>sit using bed rails and HOB elevated ~ 30 deg. Pt sat at EOB x 5 in with no c/o dizziness. Pt stood from EOB with CGA and verbal cues for proper hand placement. Pt ambulated 300 ft using RW and CGA for safety. Noted with mild unsteadiness. Pt stood from the toilet with CGA and verbal cues on hand placement. Pt was assisted from sitting>supine with min assist x 1 and verbal cues log rolling. Pt is left with all needs within reach and alarm engaged.   Barriers to return to prior living situation: Lives alone, impaired balance & fall risk, Pain  Recommendations for discharge: TCU  Rationale for recommendations: Patient is below baseline mobility, needing assist with all mobility & unsteady ambulating LOB noted with and without AD. Pt will benefit from continued skilled therapy in TCU to progress safety and independence with mobility prior to returning to living independently.

## 2020-02-14 NOTE — PLAN OF CARE
POD 3. Pt AOx4, VSS on RA. C/o 5/10 pain to abd, PCA pump in use. Denies nausea. Hypo BS, - gas, abd distended. Kept NPO status for today. CT done today, - PE. Midline incision with staples, KAMLA, abd binder in place. Perez in place with adequate UOP. IVF with int abx. Up A1 GB. Ambulating in halls. Up in chair in beginning of evening, Discharge pending bowel functions.

## 2020-02-14 NOTE — PROGRESS NOTES
[facility]  Hospitalist Progress Note          Assessment and Plan:     Pelvic mass, elevated CA-125, left hydronephrosis:  POD 3 X-lap, radical resection of left adnexal tumor with TAHBSO, left ureterolysis, omentectomy, left pelvic and para-aortic lymphadenectomy. 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 Will need chemotherapy in adjuvant setting once recovered from surgery.  Will need port placed. Either upon discharge or in outpatient setting.   Remove verde today.     -Leukocytosis: WBC back to normal today. BC's and UC NGTD. On Zosyn. CT A/p w/o contrast not suspicious for infection. Repeat CBC, CMP in am.     -SOO/Oliguria: Creat still good.  UOP better. CT demonstrates unremarkable kidneys and ureters. Avoid nephrotoxic agents. Appreciate Nephrology consult. Monitor.     -Chest pain/low O2 sats: CXR with small amount of bibasilar atelectasis or infiltrate. No significant pleural effusions. CT chest neg for PE. Continue O2 per NC to keep sats >92%. Encourage IS frequently. Ambulate frequently.     -Ileus: Hypoactive BS, abdomen much less distended.  She is quite hungry.  Advance to clear fluids. Keep PCA until full fluids.     DVT Prophylaxis:  Continue on heparin for now due to SOO. Will Rx lovenox on discharge x 28 days. Will need teaching prior to discharge.     Epilepsy:  Continue Carbamazepine    Hyperlipidemia:  On Atorvastatin    HTN: Losartan on hold    Disposition:  OT/PT/SW following. Patient ok with TCU.  to arrange for placement in 2-3 days.     Discussed with RN. Appreciate cares.     Patient seen and examined.  Abdomen remains distended and tympanetic with minimal BS c/w ileus.  Discussed pathology and need for adjuvant chemotherapy.  Will have further discussion with family (Brother's) once I'm back next week on Tuesday or as an outpatient for treatment planning if she is discharged earlier.      Keara Anderson, APRN  [phone]             Interval  "History:   Abdominal pain minimal.  Asking for food.  No N/V. No flatus yet.  Awake and alert, cheerful.               Medications:   I have reviewed this patient's current medications               Physical Exam:   Blood pressure 139/58, pulse 95, temperature 97.1  F (36.2  C), temperature source Oral, resp. rate 16, height 1.727 m (5' 8\"), weight 82.4 kg (181 lb 11.2 oz), SpO2 94 %.      Vital Sign Ranges  Temperature Temp  Av.2  F (36.2  C)  Min: 96.6  F (35.9  C)  Max: 98.5  F (36.9  C)   Blood pressure Systolic (24hrs), Av , Min:109 , Max:133        Diastolic (24hrs), Av, Min:41, Max:83      Pulse Pulse  Av.1  Min: 55  Max: 73   Respirations Resp  Av.9  Min: 8  Max: 19   Pulse oximetry SpO2  Av.3 %  Min: 91 %  Max: 100 %           Intake/Output Summary (Last 24 hours) at 2020 1116  Last data filed at 2020 1053  Gross per 24 hour   Intake 2087 ml   Output 875 ml   Net 1212 ml       Lungs:   CTA  All fields, no crackles, no rhonchi     Cardiovascular:   normal apical pulses      Abdomen:    BS scarce, abdomen minimally distended, not tympanitic, no pain to palpation, no guarding. Incision c/d/i.      Musculoskeletal:   no lower extremity pitting edema present                Data:   All laboratory data reviewed  "

## 2020-02-14 NOTE — PROGRESS NOTES
"SPIRITUAL HEALTH SERVICES Progress Note  [facility]    Visit with pt, per her length of hospital stay.  Pt shared the story of her recent diagnosis of cancer, and the shock and surprise of this diagnosis.    Pt has heard that she will be in the hospital for several weeks, and then go to rehab before she is to return home.    Pt has four brothers and one sister.  She says her younger sister, who has Downs Syndrome, is her best friend.  Pt said that she was about to start making plans for her sister to move from her group home into long-term residential care (due to her sister's diagnosis of dementia), so she feels a sense of responsibility to continue with that process. She anticipates that this task will be a bit complicated now due to her own new cancer diagnosis.  Pt says that most her brothers and their wives live locally, so she does have family support.    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    Provided conversation and support.  Pt has no specific chris community, but considers herself spiritual, and appreciates supportive conversation and prayer.    Pt invited me to stop \"anytime you're in this hallway.\"  I will plan to see her again next week if she remains hospitalized.                                                                                                                                               Sierra Pablo M.A.  Staff   Pager [phone]  Phone [phone]      "

## 2020-02-14 NOTE — PROGRESS NOTES
POD 4. A&Ox4. VSS, weaned off of O2-sating at 94% on RA. Tachy at times. Pain to abd-PCA pump in use (0.2mg dilaudid), ice applied. Hypo BS, not passing gas, advanced to clear liquid diet, no n/v w/ fluids. Abd less distended today. If tolerating diet well, pt will transition to PO pain meds. Midline incision w/ staples, approximated, abd binder in place. Perez removed at 0900, voiding adequately in BR. Up SBA w/ GB, ambulated in hallway x2 this shift + chair for meals. PIV x2 w/  IVF + abx. Liver enzymes mildly elevated-per MD, keep monitoring. Discharge pending bowel function and recovery. Will go to TCU.

## 2020-02-15 LAB
ALBUMIN SERPL-MCNC: 2.2 G/DL (ref 3.4–5)
ALP SERPL-CCNC: 280 U/L (ref 40–150)
ALT SERPL W P-5'-P-CCNC: 106 U/L (ref 0–50)
ANION GAP SERPL CALCULATED.3IONS-SCNC: 5 MMOL/L (ref 3–14)
AST SERPL W P-5'-P-CCNC: 113 U/L (ref 0–45)
BILIRUB SERPL-MCNC: 0.5 MG/DL (ref 0.2–1.3)
BUN SERPL-MCNC: 5 MG/DL (ref 7–30)
CALCIUM SERPL-MCNC: 8.6 MG/DL (ref 8.5–10.1)
CHLORIDE SERPL-SCNC: 105 MMOL/L (ref 94–109)
CO2 SERPL-SCNC: 25 MMOL/L (ref 20–32)
CREAT SERPL-MCNC: 0.72 MG/DL (ref 0.52–1.04)
ERYTHROCYTE [DISTWIDTH] IN BLOOD BY AUTOMATED COUNT: 13.3 % (ref 10–15)
GFR SERPL CREATININE-BSD FRML MDRD: 90 ML/MIN/{1.73_M2}
GLUCOSE SERPL-MCNC: 125 MG/DL (ref 70–99)
HCT VFR BLD AUTO: 29.4 % (ref 35–47)
HGB BLD-MCNC: 9.7 G/DL (ref 11.7–15.7)
MCH RBC QN AUTO: 30 PG (ref 26.5–33)
MCHC RBC AUTO-ENTMCNC: 33 G/DL (ref 31.5–36.5)
MCV RBC AUTO: 91 FL (ref 78–100)
PLATELET # BLD AUTO: 447 10E9/L (ref 150–450)
POTASSIUM SERPL-SCNC: 4 MMOL/L (ref 3.4–5.3)
PROT SERPL-MCNC: 5.8 G/DL (ref 6.8–8.8)
RBC # BLD AUTO: 3.23 10E12/L (ref 3.8–5.2)
SODIUM SERPL-SCNC: 135 MMOL/L (ref 133–144)
WBC # BLD AUTO: 7.5 10E9/L (ref 4–11)

## 2020-02-15 PROCEDURE — 36415 COLL VENOUS BLD VENIPUNCTURE: CPT | Performed by: PHYSICIAN ASSISTANT

## 2020-02-15 PROCEDURE — 12000000 ZZH R&B MED SURG/OB

## 2020-02-15 PROCEDURE — 25000132 ZZH RX MED GY IP 250 OP 250 PS 637: Mod: GY | Performed by: NURSE PRACTITIONER

## 2020-02-15 PROCEDURE — 85027 COMPLETE CBC AUTOMATED: CPT | Performed by: PHYSICIAN ASSISTANT

## 2020-02-15 PROCEDURE — 25000132 ZZH RX MED GY IP 250 OP 250 PS 637: Mod: GY | Performed by: OBSTETRICS & GYNECOLOGY

## 2020-02-15 PROCEDURE — 25000128 H RX IP 250 OP 636: Performed by: NURSE PRACTITIONER

## 2020-02-15 PROCEDURE — 25800030 ZZH RX IP 258 OP 636: Performed by: INTERNAL MEDICINE

## 2020-02-15 PROCEDURE — 80053 COMPREHEN METABOLIC PANEL: CPT | Performed by: PHYSICIAN ASSISTANT

## 2020-02-15 RX ADMIN — HEPARIN SODIUM 5000 UNITS: 5000 INJECTION, SOLUTION INTRAVENOUS; SUBCUTANEOUS at 17:14

## 2020-02-15 RX ADMIN — Medication 1 MG: at 23:17

## 2020-02-15 RX ADMIN — POTASSIUM CHLORIDE, DEXTROSE MONOHYDRATE AND SODIUM CHLORIDE: 150; 5; 450 INJECTION, SOLUTION INTRAVENOUS at 20:51

## 2020-02-15 RX ADMIN — PIPERACILLIN AND TAZOBACTAM 3.38 G: 3; .375 INJECTION, POWDER, FOR SOLUTION INTRAVENOUS at 08:34

## 2020-02-15 RX ADMIN — PIPERACILLIN AND TAZOBACTAM 3.38 G: 3; .375 INJECTION, POWDER, FOR SOLUTION INTRAVENOUS at 19:55

## 2020-02-15 RX ADMIN — HEPARIN SODIUM 5000 UNITS: 5000 INJECTION, SOLUTION INTRAVENOUS; SUBCUTANEOUS at 01:46

## 2020-02-15 RX ADMIN — PIPERACILLIN AND TAZOBACTAM 3.38 G: 3; .375 INJECTION, POWDER, FOR SOLUTION INTRAVENOUS at 13:49

## 2020-02-15 RX ADMIN — CARBAMAZEPINE 400 MG: 200 CAPSULE, EXTENDED RELEASE ORAL at 21:53

## 2020-02-15 RX ADMIN — Medication 1 MG: at 01:54

## 2020-02-15 RX ADMIN — ATORVASTATIN CALCIUM 80 MG: 80 TABLET, FILM COATED ORAL at 21:53

## 2020-02-15 RX ADMIN — HEPARIN SODIUM 5000 UNITS: 5000 INJECTION, SOLUTION INTRAVENOUS; SUBCUTANEOUS at 08:34

## 2020-02-15 RX ADMIN — POTASSIUM CHLORIDE, DEXTROSE MONOHYDRATE AND SODIUM CHLORIDE: 150; 5; 450 INJECTION, SOLUTION INTRAVENOUS at 00:04

## 2020-02-15 RX ADMIN — CARBAMAZEPINE 400 MG: 200 CAPSULE, EXTENDED RELEASE ORAL at 08:34

## 2020-02-15 RX ADMIN — PIPERACILLIN AND TAZOBACTAM 3.38 G: 3; .375 INJECTION, POWDER, FOR SOLUTION INTRAVENOUS at 01:46

## 2020-02-15 RX ADMIN — POTASSIUM CHLORIDE, DEXTROSE MONOHYDRATE AND SODIUM CHLORIDE: 150; 5; 450 INJECTION, SOLUTION INTRAVENOUS at 10:14

## 2020-02-15 ASSESSMENT — ACTIVITIES OF DAILY LIVING (ADL)
ADLS_ACUITY_SCORE: 14
ADLS_ACUITY_SCORE: 13
ADLS_ACUITY_SCORE: 14

## 2020-02-15 ASSESSMENT — MIFFLIN-ST. JEOR: SCORE: 1438.14

## 2020-02-15 NOTE — PLAN OF CARE
A&Ox4. VSS on room air. C/o incisional abdominal pain - well controlled with Dilaudid PCA. Denied nausea. Clear liquid diet. Hypoactive BS, denies passing flatus. Midline incision KAMLA, WDL - ice applied. Voiding adequately, SBA. L PIV inf and R PIV SL+ intermittent antibiotics.

## 2020-02-15 NOTE — PROGRESS NOTES
[facility]  Hospitalist Progress Note          Assessment and Plan:     Pelvic mass, elevated CA-125, left hydronephrosis:  POD 4 X-lap, radical resection of left adnexal tumor with TAHBSO, left ureterolysis, omentectomy, left pelvic and para-aortic lymphadenectomy. 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 Will need chemotherapy in adjuvant setting once recovered from surgery.  Will need port placed. Either upon discharge or in outpatient setting.       -Leukocytosis: WBC back to normal. BC's and UC NGTD. On Zosyn. CT A/p w/o contrast not suspicious for infection. Repeat CBC, CMP in am.     -SOO/Oliguria: Creat still good.  UOP better. CT demonstrates unremarkable kidneys and ureters. Avoid nephrotoxic agents. Appreciate Nephrology consult. Monitor.     -Chest pain/low O2 sats: CXR with small amount of bibasilar atelectasis or infiltrate. No significant pleural effusions. CT chest neg for PE. Continue O2 per NC to keep sats >92%. Encourage IS frequently. Ambulate frequently.     -Ileus: Hypoactive BS, abdomen much less distended.  She is quite hungry.  Advanced to full liquids and tolerating without n/v.  PCA to remain today but likely discontinue tomorrow if continuing to tolerate diet advancement.    DVT Prophylaxis:  Continue on heparin for now due to SOO. Will Rx lovenox on discharge x 28 days. Will need teaching prior to discharge.     Epilepsy:  Continue Carbamazepine    Hyperlipidemia:  On Atorvastatin    HTN: Losartan on hold    Disposition:  OT/PT/SW following. Patient ok with TCU.  to arrange for placement in 2-3 days.     Discussed with RN. Appreciate cares.     Patient seen and examined.  Abdomen remains mildly distended non tympanetic with rare BS c/w ileus.        Annalise Wolfe PA-C  [facility]ne[facility]  Cell [phone] (7am to 1pm weekends)  Answering Service: [phone]               Interval History:   Abdominal pain minimal.  Asking  "for food.  No N/V. No flatus yet.  Awake and alert, cheerful.  Has been up and ambulating.              Medications:   I have reviewed this patient's current medications               Physical Exam:   Blood pressure 128/63, pulse 95, temperature 97.4  F (36.3  C), temperature source Oral, resp. rate 18, height 1.727 m (5' 8\"), weight 82.5 kg (181 lb 12.8 oz), SpO2 90 %.      Vital Sign Ranges  Temperature Temp  Av.2  F (36.2  C)  Min: 96.6  F (35.9  C)  Max: 98.5  F (36.9  C)   Blood pressure Systolic (24hrs), Av , Min:109 , Max:133        Diastolic (24hrs), Av, Min:41, Max:83      Pulse Pulse  Av.1  Min: 55  Max: 73   Respirations Resp  Av.9  Min: 8  Max: 19   Pulse oximetry SpO2  Av.3 %  Min: 91 %  Max: 100 %           Intake/Output Summary (Last 24 hours) at 2020 1116  Last data filed at 2020 1053  Gross per 24 hour   Intake 2087 ml   Output 875 ml   Net 1212 ml       Lungs:   CTA  All fields, no crackles, no rhonchi     Cardiovascular:   normal apical pulses      Abdomen:    BS rare, abdomen minimally distended, not tympanitic, no pain to palpation, no guarding. Incision c/d/i.      Musculoskeletal:   no lower extremity pitting edema present                Data:   All laboratory data reviewed  "

## 2020-02-15 NOTE — PLAN OF CARE
Pt A/Ox4, VSS on RA except tachycardic, BP slightly elevated.  Pain adequately controlled with Dilaudid PCA.  Tolerated clears for dinner, denies nausea.  BS still hypoactive, not passing gas per pt.  Slightly distended.  Midline incision KAMLA and WNL.  Voiding adequately, up with SBA.  Discharge pending diet advance, nursing continue to monitor.

## 2020-02-15 NOTE — CONSULTS
Care Transition Initial Assessment -      Met with: Patient    Principal Problem:    Pelvic mass  Active Problems:    Elevated CA-125    Hydronephrosis    Pelvic mass in female       DATA  Lives With: alone   Living Arrangements: apartment  Quality of Family Relationships: helpful, involved, supportive  Description of Support System: Supportive  Who is your support system?: Sibling(s)  Support Assessment: Adequate family and caregiver support, Adequate social supports.   Quality of Family Relationships: helpful, involved, supportive  Transportation Anticipated: BrotherZac ([phone]) might be able to provide, otherwise patient requests [employer] Wheelchair ride    Identified issues/concerns regarding health management:  Per Care Transitions consult for discharge planning. Patient admitted to [facility] on 2/10/20 with a pelvic mass. The tentative date of discharge is 2/17 or 2/18/20. Reviewed chart and spoke with patient regarding discharge planning and therapy recommendations for TCU.  Patient lives alone in a condo; senior living with no stairs to enter. TCU options were given and patient gave choices in order of preference and contingent on proximity to her brother whom is her support system; [facility], [facility], [facility] [place], [facility], [facility] and [facility]. Initial SNF referrals sent via [facility]. Transportation options discussed. [employer] wheelchair ride at $74.90 base fare and $4.82 per mile requested if patients [employer] is not available on the day of discharge. Patient is in agreement with this plan.       ASSESSMENT  Cognitive Status:  awake, alert and oriented  Concerns to be addressed: Discharge planning to TCU and transportation .     PLAN  Financial costs for the patient includes; [employer] transportation.  Patient given options and choices for discharge yes .  Patient/family is agreeable to the plan?  Yes:    Transportation/person available to transport on day of discharge  is [place] or Zac miranda ([phone]) and have they been notified/set up TBD  Patient Goals and Preferences: TCU .  Patient anticipates discharging to:  TCU .    Pt/family was given the Medicare Compare list for SNF, with associated star ratings to assist with choice for referrals/discharge planning Yes    Education was given to pt/family that star ratings are updated/maintained by Medicare and can be reviewed by visiting www.medicare.gov Yes    TIKI Gaines, [facility]  [phone]

## 2020-02-16 LAB
ALBUMIN SERPL-MCNC: 1.9 G/DL (ref 3.4–5)
ALP SERPL-CCNC: 265 U/L (ref 40–150)
ALT SERPL W P-5'-P-CCNC: 86 U/L (ref 0–50)
ANION GAP SERPL CALCULATED.3IONS-SCNC: 6 MMOL/L (ref 3–14)
AST SERPL W P-5'-P-CCNC: 82 U/L (ref 0–45)
BILIRUB SERPL-MCNC: 0.4 MG/DL (ref 0.2–1.3)
BUN SERPL-MCNC: 5 MG/DL (ref 7–30)
CALCIUM SERPL-MCNC: 8.5 MG/DL (ref 8.5–10.1)
CHLORIDE SERPL-SCNC: 104 MMOL/L (ref 94–109)
CO2 SERPL-SCNC: 25 MMOL/L (ref 20–32)
CREAT SERPL-MCNC: 0.76 MG/DL (ref 0.52–1.04)
ERYTHROCYTE [DISTWIDTH] IN BLOOD BY AUTOMATED COUNT: 13.2 % (ref 10–15)
GFR SERPL CREATININE-BSD FRML MDRD: 84 ML/MIN/{1.73_M2}
GLUCOSE SERPL-MCNC: 121 MG/DL (ref 70–99)
HCT VFR BLD AUTO: 28.1 % (ref 35–47)
HGB BLD-MCNC: 9.3 G/DL (ref 11.7–15.7)
MCH RBC QN AUTO: 29.8 PG (ref 26.5–33)
MCHC RBC AUTO-ENTMCNC: 33.1 G/DL (ref 31.5–36.5)
MCV RBC AUTO: 90 FL (ref 78–100)
PLATELET # BLD AUTO: 436 10E9/L (ref 150–450)
POTASSIUM SERPL-SCNC: 3.7 MMOL/L (ref 3.4–5.3)
PROT SERPL-MCNC: 5.2 G/DL (ref 6.8–8.8)
RBC # BLD AUTO: 3.12 10E12/L (ref 3.8–5.2)
SODIUM SERPL-SCNC: 135 MMOL/L (ref 133–144)
WBC # BLD AUTO: 6.4 10E9/L (ref 4–11)

## 2020-02-16 PROCEDURE — 25000132 ZZH RX MED GY IP 250 OP 250 PS 637: Mod: GY | Performed by: OBSTETRICS & GYNECOLOGY

## 2020-02-16 PROCEDURE — 85027 COMPLETE CBC AUTOMATED: CPT | Performed by: PHYSICIAN ASSISTANT

## 2020-02-16 PROCEDURE — 25000128 H RX IP 250 OP 636: Performed by: NURSE PRACTITIONER

## 2020-02-16 PROCEDURE — 25000132 ZZH RX MED GY IP 250 OP 250 PS 637: Mod: GY | Performed by: NURSE PRACTITIONER

## 2020-02-16 PROCEDURE — 12000000 ZZH R&B MED SURG/OB

## 2020-02-16 PROCEDURE — 25800030 ZZH RX IP 258 OP 636: Performed by: INTERNAL MEDICINE

## 2020-02-16 PROCEDURE — 36415 COLL VENOUS BLD VENIPUNCTURE: CPT | Performed by: PHYSICIAN ASSISTANT

## 2020-02-16 PROCEDURE — 80053 COMPREHEN METABOLIC PANEL: CPT | Performed by: PHYSICIAN ASSISTANT

## 2020-02-16 RX ORDER — IBUPROFEN 600 MG/1
600 TABLET, FILM COATED ORAL EVERY 6 HOURS PRN
Status: DISCONTINUED | OUTPATIENT
Start: 2020-02-16 | End: 2020-02-22

## 2020-02-16 RX ORDER — HYDROMORPHONE HYDROCHLORIDE 1 MG/ML
0.2 INJECTION, SOLUTION INTRAMUSCULAR; INTRAVENOUS; SUBCUTANEOUS
Status: DISCONTINUED | OUTPATIENT
Start: 2020-02-16 | End: 2020-02-19

## 2020-02-16 RX ADMIN — POTASSIUM CHLORIDE, DEXTROSE MONOHYDRATE AND SODIUM CHLORIDE: 150; 5; 450 INJECTION, SOLUTION INTRAVENOUS at 08:14

## 2020-02-16 RX ADMIN — Medication 1 LOZENGE: at 21:06

## 2020-02-16 RX ADMIN — HEPARIN SODIUM 5000 UNITS: 5000 INJECTION, SOLUTION INTRAVENOUS; SUBCUTANEOUS at 01:53

## 2020-02-16 RX ADMIN — CARBAMAZEPINE 400 MG: 200 CAPSULE, EXTENDED RELEASE ORAL at 21:00

## 2020-02-16 RX ADMIN — POTASSIUM CHLORIDE, DEXTROSE MONOHYDRATE AND SODIUM CHLORIDE: 150; 5; 450 INJECTION, SOLUTION INTRAVENOUS at 21:01

## 2020-02-16 RX ADMIN — CARBAMAZEPINE 400 MG: 200 CAPSULE, EXTENDED RELEASE ORAL at 09:06

## 2020-02-16 RX ADMIN — HEPARIN SODIUM 5000 UNITS: 5000 INJECTION, SOLUTION INTRAVENOUS; SUBCUTANEOUS at 09:06

## 2020-02-16 RX ADMIN — HEPARIN SODIUM 5000 UNITS: 5000 INJECTION, SOLUTION INTRAVENOUS; SUBCUTANEOUS at 17:32

## 2020-02-16 RX ADMIN — ATORVASTATIN CALCIUM 80 MG: 80 TABLET, FILM COATED ORAL at 21:00

## 2020-02-16 RX ADMIN — ACETAMINOPHEN 650 MG: 325 TABLET, FILM COATED ORAL at 17:32

## 2020-02-16 RX ADMIN — PIPERACILLIN AND TAZOBACTAM 3.38 G: 3; .375 INJECTION, POWDER, FOR SOLUTION INTRAVENOUS at 01:53

## 2020-02-16 RX ADMIN — ACETAMINOPHEN 325 MG: 325 TABLET, FILM COATED ORAL at 09:14

## 2020-02-16 RX ADMIN — IBUPROFEN 600 MG: 600 TABLET ORAL at 14:54

## 2020-02-16 RX ADMIN — PIPERACILLIN AND TAZOBACTAM 3.38 G: 3; .375 INJECTION, POWDER, FOR SOLUTION INTRAVENOUS at 09:06

## 2020-02-16 RX ADMIN — ACETAMINOPHEN 325 MG: 325 TABLET, FILM COATED ORAL at 09:25

## 2020-02-16 ASSESSMENT — ENCOUNTER SYMPTOMS
NAUSEA: 0
PAIN SEVERITY NOW: MODERATE
DIETARY ISSUES: POOR INTAKE
ACTIVITY IMPAIRMENT: IMPAIRED DUE TO WEAKNESS
SUBJECTIVE PAIN PROGRESSION: WORSENING
SUBJECTIVE PATIENT PAIN CONTROL: WELL CONTROLLED
FEVER: 0
VOMITING: 0

## 2020-02-16 ASSESSMENT — ACTIVITIES OF DAILY LIVING (ADL)
ADLS_ACUITY_SCORE: 12
ADLS_ACUITY_SCORE: 12
ADLS_ACUITY_SCORE: 14
ADLS_ACUITY_SCORE: 12

## 2020-02-16 ASSESSMENT — MIFFLIN-ST. JEOR: SCORE: 1444.49

## 2020-02-16 NOTE — PLAN OF CARE
A/o x4.  Mild discomfort to midline incision.  Declines ice.  Wears ABD binder and has dilaudid PCA.  Bowel sounds hypoactive.  Denies flatus.  Tolerating clears, may advance to fulls tomorrow.  Up ambulating in halls x 2 with SBA.  Sitting up in chair this am for breakfast.  Plan for discharge to TCU possibly Monday.

## 2020-02-16 NOTE — PROGRESS NOTES
"Charlene Dia is a 59 year old female patient.  1. Pelvic mass      Past Medical History:   Diagnosis Date     Abdominal lump, pelvic mass      Anxiety 2/6/2012     Asthma, intermittent controlled      CARDIOVASCULAR SCREENING; LDL GOAL LESS THAN 130      Chronic neck pain      Chronic tension type headache      Cough      Epilepsy (H)      Epilepsy (H)      Hyperlipidemia      Hypertension      Insomnia      Insomnia      Mental developmental delay      Neurotic excoriations      Ovarian cancer (H)      Pain in both hands      Current Outpatient Medications   Medication Sig Dispense Refill     enoxaparin ANTICOAGULANT (LOVENOX ANTICOAGULANT) 40 MG/0.4ML syringe Inject 0.4 mLs (40 mg) Subcutaneous daily for 28 days 28 Syringe 0     HYDROcodone-acetaminophen (NORCO) 5-325 MG tablet Take 1-2 tablets by mouth every 4 hours as needed for pain maximum 10 tablet(s) per day 15 tablet 0     ibuprofen (ADVIL/MOTRIN) 600 MG tablet Take 1 tablet (600 mg) by mouth every 6 hours as needed for moderate pain 30 tablet 1     senna (SENOKOT) 8.6 MG tablet Take 1-3 tablets by mouth 2 times daily as needed for constipation 30 tablet 0     No Known Allergies  Principal Problem:    Pelvic mass  Active Problems:    Elevated CA-125    Hydronephrosis    Pelvic mass in female    Blood pressure (!) 154/76, pulse 95, temperature 97.7  F (36.5  C), temperature source Oral, resp. rate 18, height 1.727 m (5' 8\"), weight 83.1 kg (183 lb 3.2 oz), SpO2 94 %.    Subjective:  Symptoms:  Worsening.  (Abdominal distension, nausea, reflux. Denies flatus, BM. Ambulated once today only. Continues to burp. Denies vomiting. ).    Diet:  Poor intake.  No nausea or vomiting.    Activity level: Impaired due to weakness.    Pain:  She complains of pain that is moderate.  She reports pain is worsening.  Pain is well controlled.      Objective:  General Appearance:  Well-appearing, comfortable, in distress and in pain (abdominal distension. Uncomfortable. ).  " "  Vital signs: (most recent): Blood pressure (!) 154/76, pulse 95, temperature 97.7  F (36.5  C), temperature source Oral, resp. rate 18, height 1.727 m (5' 8\"), weight 83.1 kg (183 lb 3.2 oz), SpO2 94 %.  Vital signs are normal.  No fever.    Output: Producing urine and no stool output.    HEENT: Normal HEENT exam.    Lungs:  Normal effort.  She is not in respiratory distress.    Chest: Symmetric chest wall expansion.   Abdomen: Abdomen is soft and distended.  (Incision c/d/i with staples. No erythema).  There is no abdominal tenderness.     Extremities: Normal range of motion.  There is no deformity or effusion.  (1+ dependent edema)  Pulses: Distal pulses are intact.    Neurological: Patient is alert and oriented to person, place and time.    Pupils:  Pupils are equal, round, and reactive to light.    Skin:  Warm and dry.      Assessment:    Condition: In stable condition.  Unchanged.   (Charlene is a very pleasant 58 yo CF who is POD#5 s/p X-lap, radical resection of left adnexal tumor with TAHBSO, left ureterolysis, omentectomy, left pelvic and para-aortic lymphadenectomy for intraoperative stage II clear cell carcinoma of the left ovary. Final pathology completed. ).     Plan:   Encourage ambulation and out of bed and up to chair.  Clear liquid diet.  (Pelvic mass, elevated CA-125, left hydronephrosis:  POD 5 X-lap, radical resection of left adnexal tumor with TAHBSO, left ureterolysis, omentectomy, left pelvic and para-aortic lymphadenectomy. 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 (pathology report reads IIIC -- clarify with pathology true stage as only left pelvic sidewall involvement noted) Will need chemotherapy in adjuvant setting once recovered from surgery.  Will need port placed. Either upon discharge or in outpatient setting.     -Postoperative ileus. Do not advance diet. Encouraged sips and minimal PO intake. On IVF at 100 ml/h. Decrease narcotic use. discontinue PCA, use prn non-narcotics " and oxycodone with breakthrough IV. Encouraged ambulation. If NO bowel function by POD#6, repeat CT AP to evaluate.       -Leukocytosis: WBC back to normal. BC's and UC NGTD. On Zosyn. CT A/p w/o contrast not suspicious for infection. Repeat CBC, CMP in am.      -SOO/Oliguria: Creat still good.  UOP better. CT demonstrates unremarkable kidneys and ureters. Avoid nephrotoxic agents. Appreciate Nephrology consult. Monitor.      -Chest pain/low O2 sats: CXR with small amount of bibasilar atelectasis or infiltrate. No significant pleural effusions. CT chest neg for PE. Continue O2 per NC to keep sats >92%. Encourage IS frequently. Ambulate frequently.      DVT Prophylaxis:  Continue on heparin for now due to SOO. Will Rx lovenox on discharge x 28 days. Will need teaching prior to discharge.      Epilepsy:  Continue Carbamazepine     Hyperlipidemia:  On Atorvastatin     HTN: Losartan on hold     Disposition:  OT/PT/SW following. Patient ok with TCU.  to arrange for placement in 2-3 days.      Discussed with RN. Appreciate cares.      Patient seen and examined.  Abdomen remains mildly distended non tympanetic with rare BS c/w ileus.     ).       Jessica eBavers MD  2/16/2020

## 2020-02-16 NOTE — PLAN OF CARE
POD5. A&O. VSS. Midline incision KAMLA, CDI with staples. Tolerating full liq diet. Up with Ax1 w/w.  Pain well controlled with PCA pump, using ice and abd binder. BS still hypoactive, pt denies passing flatus. Voiding WNL. Plan for possible Port placement before discharge for continued chemotherapy. Discharge to TCU pending.

## 2020-02-17 ENCOUNTER — APPOINTMENT (OUTPATIENT)
Dept: PHYSICAL THERAPY | Facility: CLINIC | Age: 60
DRG: 737 | End: 2020-02-17
Attending: OBSTETRICS & GYNECOLOGY
Payer: MEDICARE

## 2020-02-17 ENCOUNTER — APPOINTMENT (OUTPATIENT)
Dept: CT IMAGING | Facility: CLINIC | Age: 60
DRG: 737 | End: 2020-02-17
Attending: NURSE PRACTITIONER
Payer: MEDICARE

## 2020-02-17 PROCEDURE — 25000128 H RX IP 250 OP 636: Performed by: NURSE PRACTITIONER

## 2020-02-17 PROCEDURE — 25000128 H RX IP 250 OP 636: Performed by: OBSTETRICS & GYNECOLOGY

## 2020-02-17 PROCEDURE — 25000132 ZZH RX MED GY IP 250 OP 250 PS 637: Mod: GY | Performed by: OBSTETRICS & GYNECOLOGY

## 2020-02-17 PROCEDURE — 97116 GAIT TRAINING THERAPY: CPT | Mod: GP

## 2020-02-17 PROCEDURE — 74177 CT ABD & PELVIS W/CONTRAST: CPT

## 2020-02-17 PROCEDURE — 25000132 ZZH RX MED GY IP 250 OP 250 PS 637: Mod: GY | Performed by: NURSE PRACTITIONER

## 2020-02-17 PROCEDURE — 25000125 ZZHC RX 250: Performed by: OBSTETRICS & GYNECOLOGY

## 2020-02-17 PROCEDURE — 25800030 ZZH RX IP 258 OP 636: Performed by: INTERNAL MEDICINE

## 2020-02-17 PROCEDURE — 12000000 ZZH R&B MED SURG/OB

## 2020-02-17 RX ORDER — IOPAMIDOL 755 MG/ML
94 INJECTION, SOLUTION INTRAVASCULAR ONCE
Status: DISCONTINUED | OUTPATIENT
Start: 2020-02-17 | End: 2020-02-18

## 2020-02-17 RX ORDER — IOPAMIDOL 755 MG/ML
94 INJECTION, SOLUTION INTRAVASCULAR ONCE
Status: COMPLETED | OUTPATIENT
Start: 2020-02-17 | End: 2020-02-17

## 2020-02-17 RX ORDER — FUROSEMIDE 10 MG/ML
10 INJECTION INTRAMUSCULAR; INTRAVENOUS ONCE
Status: COMPLETED | OUTPATIENT
Start: 2020-02-17 | End: 2020-02-17

## 2020-02-17 RX ADMIN — ACETAMINOPHEN 650 MG: 325 TABLET, FILM COATED ORAL at 08:23

## 2020-02-17 RX ADMIN — IBUPROFEN 600 MG: 600 TABLET ORAL at 17:09

## 2020-02-17 RX ADMIN — HEPARIN SODIUM 5000 UNITS: 5000 INJECTION, SOLUTION INTRAVENOUS; SUBCUTANEOUS at 10:11

## 2020-02-17 RX ADMIN — HYDROCODONE BITARTRATE AND ACETAMINOPHEN 1 TABLET: 5; 325 TABLET ORAL at 22:07

## 2020-02-17 RX ADMIN — ACETAMINOPHEN 650 MG: 325 TABLET, FILM COATED ORAL at 13:41

## 2020-02-17 RX ADMIN — POTASSIUM CHLORIDE, DEXTROSE MONOHYDRATE AND SODIUM CHLORIDE: 150; 5; 450 INJECTION, SOLUTION INTRAVENOUS at 16:54

## 2020-02-17 RX ADMIN — ONDANSETRON 4 MG: 2 INJECTION INTRAMUSCULAR; INTRAVENOUS at 21:01

## 2020-02-17 RX ADMIN — Medication 1 MG: at 23:17

## 2020-02-17 RX ADMIN — ATORVASTATIN CALCIUM 80 MG: 80 TABLET, FILM COATED ORAL at 23:26

## 2020-02-17 RX ADMIN — Medication 1 MG: at 01:39

## 2020-02-17 RX ADMIN — CARBAMAZEPINE 400 MG: 200 CAPSULE, EXTENDED RELEASE ORAL at 22:07

## 2020-02-17 RX ADMIN — IOPAMIDOL 94 ML: 755 INJECTION, SOLUTION INTRAVENOUS at 11:15

## 2020-02-17 RX ADMIN — SODIUM CHLORIDE 69 ML: 9 INJECTION, SOLUTION INTRAVENOUS at 11:15

## 2020-02-17 RX ADMIN — HEPARIN SODIUM 5000 UNITS: 5000 INJECTION, SOLUTION INTRAVENOUS; SUBCUTANEOUS at 16:49

## 2020-02-17 RX ADMIN — FUROSEMIDE 10 MG: 10 INJECTION, SOLUTION INTRAVENOUS at 10:11

## 2020-02-17 RX ADMIN — ACETAMINOPHEN 650 MG: 325 TABLET, FILM COATED ORAL at 01:39

## 2020-02-17 RX ADMIN — HEPARIN SODIUM 5000 UNITS: 5000 INJECTION, SOLUTION INTRAVENOUS; SUBCUTANEOUS at 00:39

## 2020-02-17 RX ADMIN — POTASSIUM CHLORIDE, DEXTROSE MONOHYDRATE AND SODIUM CHLORIDE: 150; 5; 450 INJECTION, SOLUTION INTRAVENOUS at 05:25

## 2020-02-17 RX ADMIN — CARBAMAZEPINE 400 MG: 200 CAPSULE, EXTENDED RELEASE ORAL at 08:13

## 2020-02-17 ASSESSMENT — MIFFLIN-ST. JEOR: SCORE: 1466.72

## 2020-02-17 ASSESSMENT — ACTIVITIES OF DAILY LIVING (ADL)
ADLS_ACUITY_SCORE: 14

## 2020-02-17 NOTE — PROGRESS NOTES
LIZABETH    I) Received a call from Admission rep at [facility] who states they have no beds available.  Patricia Coats called to say they have no beds available until Thursday. Asked her to keep the referral as patient's discharge date is still uncertain.    P) LIZABETH is following for discharge coordination.

## 2020-02-17 NOTE — PLAN OF CARE
A/o x4.  Mild discomfort to midline incision.  Using ice, wears ABD binder and alternating tylenol and ibuprofen.  Bowel sounds hypoactive.  Denies flatus.  Tolerating clears.  Up ambulating in halls  x3 with SBA with walker or IV pole.  Plan for discharge to TCU possibly Monday.

## 2020-02-17 NOTE — PLAN OF CARE
6904-1714:  POD6.  A/Ox4, forgetful.  VSS.  Midline incision KAMLA, abdominal binder in place, using ice for comfort.  No pain meds needed this shift.  Plan to discharge to TCU when medically stable.

## 2020-02-17 NOTE — PLAN OF CARE
A&OX4, tearful this shift. VSS ex HTN. SBA w/ GB. Ambulated in the halls twice today. Hypo BS, - gas. Abd distended, abd binder in place over incision on stomach; closed with staples. C/o of 6/10 abdominal pain- giving tylenol and trying to avoid opioids due to constipation. CT done today, see notes. Clear liquid diet. Plan for TPN feedings to start tomorrow. PIV @ 100. Possible port placement prior to discharge for outpatient chemo; PICC line for TPN (unless port wants to be used for that). Lasix given today for fluid retention. Discharge pending.

## 2020-02-17 NOTE — PLAN OF CARE
A&Ox4, forgetful. VSS on room air. C/o abdominal pain - PRN Tylenol given x1 and heat packs, slept well overnight. Midline abdominal incision KAMLA, WDL. Abdominal binder in place. Denied nausea. Clear liquid diet. Hypoactive BS, denies passing flatus. SBA, voiding in bathroom. PIV inf D5 1/2 NS with 20K at 100/hr.

## 2020-02-17 NOTE — PROGRESS NOTES
"CLINICAL NUTRITION SERVICES  -  ASSESSMENT NOTE      Future/Additional Recommendations:     If unable to advance diet in the next 48-72 hrs, may need to consider alternate means of nutrition     Malnutrition:   % Weight Loss:  None noted  % Intake:  </= 50% for >/= 5 days (severe malnutrition)  Subcutaneous Fat Loss:  None observed  Muscle Loss:  None observed  Fluid Retention:  \"fluid overload\" (likely not nutrition related)    Malnutrition Diagnosis: Patient does not meet two of the above criteria necessary for diagnosing malnutrition         REASON FOR ASSESSMENT  Charlene Dia is a 59 year old female seen by Registered Dietitian for LOS      NUTRITION HISTORY  Visited with pt this afternoon  Pt tells me that she follows a regular diet at home  Tolerates all foods (meats, grains, fruits, veggies, dairy)  Was eating well PTA  Does not consume any nutrition supplements      CURRENT NUTRITION ORDERS  Diet Order:     Clear Liquid     Pt states she was tolerating liquids well up until yesterday - \"started getting abd pain\"  Pt denies feeling hungry, but would like to have some orange jello    Per MD, \"Encouraged sips and minimal PO intake.  If no bowel function by tomorrow, will need to start TPN\"    Has been NPO/liquid diet since admit (7 days)      NUTRITION FOCUSED PHYSICAL ASSESSMENT FOR DIAGNOSING MALNUTRITION)  Yes              Observed:    No nutrition-related physical findings observed    Obtained from Chart/Interdisciplinary Team:  2/10: Exploratory laparotomy, radical resection of pelvic tumor with ADELE/BSO, left ureterolysis, excision of left cul-de-sac and pelvic peritoneum, omentectomy, and left pelvic and paraaortic lymphadenectomy    2/17: Fluid overload: gentle diuresis today    ANTHROPOMETRICS  Height: 5' 8\"  Weight:(2/10 - admit/preop) 77.3 kg  BMI: 25.8  Weight Status:  Overweight BMI 25-29.9  IBW: 63.6 kg  % IBW: 122%  Weight History: wt up post-op (net I/O: +5,579 mL).  Pt states her usual wt is " "~170# - \"can't believe that my wt is in the 180s\"  Wt Readings from Last 10 Encounters:   02/17/20 85.3 kg (188 lb 1.6 oz)   02/20/12 80.3 kg (177 lb)   02/06/12 81.3 kg (179 lb 4.8 oz)   03/18/11 78.7 kg (173 lb 9.6 oz)     Vitals:    02/10/20 1327 02/13/20 0717 02/14/20 0626 02/15/20 0518   Weight: 77.3 kg (170 lb 6.4 oz) 85.3 kg (188 lb) 82.4 kg (181 lb 11.2 oz) 82.5 kg (181 lb 12.8 oz)    02/16/20 0442 02/17/20 0535   Weight: 83.1 kg (183 lb 3.2 oz) 85.3 kg (188 lb 1.6 oz)         LABS  Labs reviewed  2/17: Abd CT ---> Findings favor a partial distal small bowel obstruction, versus postoperative adynamic ileus.    MEDICATIONS  IVF (D5 containing) @ 100 mL/hr (120 gn CHO, 408 cals)      ASSESSED NUTRITION NEEDS PER APPROVED PRACTICE GUIDELINES:    Dosing Weight 67 kg (adjusted wt for overwt)  Estimated Energy Needs: 1110-7192 kcals (25-30 Kcal/Kg)  Justification: overweight  Estimated Protein Needs:  grams protein (1.2-1.5 g pro/Kg)  Justification: post-op  Estimated Fluid Needs: 1379-8120  mL (1 mL/Kcal)  Justification: maintenance    MALNUTRITION:  % Weight Loss:  None noted  % Intake:  </= 50% for >/= 5 days (severe malnutrition)  Subcutaneous Fat Loss:  None observed  Muscle Loss:  None observed  Fluid Retention:  \"fluid overload\" (likely not nutrition related)    Malnutrition Diagnosis: Patient does not meet two of the above criteria necessary for diagnosing malnutrition      NUTRITION DIAGNOSIS:  Inadequate protein-energy intake related to dietary restrictions as evidenced by pt has been NPO/liquid diet for the past 7 days      NUTRITION INTERVENTIONS  Recommendations / Nutrition Prescription  Clear liquids (diet per MD)    If unable to advance diet in the next 48-72 hrs, may need to consider alternate means of nutrition  .      Implementation  Nutrition education ---> Reviewed current diet order  .      Nutrition Goals  Diet to advance in the next 48-72 hrs  .      MONITORING AND " EVALUATION:  Progress towards goals will be monitored and evaluated per protocol and Practice Guidelines

## 2020-02-17 NOTE — PROGRESS NOTES
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 Will need chemotherapy in adjuvant setting once recovered from surgery.  Will need port placed. Either upon discharge or in outpatient setting. Briefly discussed with patient today.     -Postoperative ileus. Repeat CT A/p today. Encouraged sips and minimal PO intake. On IVF at 100 ml/h. Decrease narcotic use. Use prn non-narcotics and oxycodone with breakthrough IV. Encouraged ambulation. Need to walk more today with nursing and PT.  If no bowel function by tomorrow, will need to start TPN. Will need consent signed for PICC if so. Consent on chart. BS improved today based on weekends assessment.     -Leukocytosis: WBC back to normal. BC's and UC NGTD. On Zosyn.     -SOO/Oliguria: resolved. Nephrology signed off. Monitor.     -Fluid overload: gentle diuresis today.      DVT Prophylaxis:  Continue on heparin for now due to SOO. Will Rx lovenox on discharge x 28 days. Will need teaching prior to discharge.     Epilepsy:  Continue Carbamazepine    Hyperlipidemia:  On Atorvastatin    HTN: Losartan on hold    Disposition:  OT/PT/SW following. Patient ok with TCU.  to arrange for placement upon resolution of ileus.   Appreciate cares.     Stephany Hanley CNP  GYN ONC  Cell: [phone]                 Interval History:   Having gas pains.  No N/V. No flatus yet. Tired. Ambulated x 3 yesterday.               Medications:   I have reviewed this patient's current medications               Physical Exam:   Blood pressure (!) 146/66, pulse 72, temperature 98.4  F (36.9  C), temperature source Oral, resp. rate 16, height 1.727 m (5' 8\"), weight 85.3 kg (188 lb 1.6 oz), SpO2 91 %.      Vital Sign " Ranges  Temperature Temp  Av.2  F (36.2  C)  Min: 96.6  F (35.9  C)  Max: 98.5  F (36.9  C)   Blood pressure Systolic (24hrs), Av , Min:109 , Max:133        Diastolic (24hrs), Av, Min:41, Max:83      Pulse Pulse  Av.1  Min: 55  Max: 73   Respirations Resp  Av.9  Min: 8  Max: 19   Pulse oximetry SpO2  Av.3 %  Min: 91 %  Max: 100 %           Intake/Output Summary (Last 24 hours) at 2020 0937  Last data filed at 2020 0525  Gross per 24 hour   Intake 5966 ml   Output 300 ml   Net 5666 ml     Lungs:   CTA  All fields, no crackles, no rhonchi     Cardiovascular:   normal apical pulses      Abdomen:    BS hyperactive, abdomen distended, tympanitic, no pain to palpation. Incision c/d/i.      Musculoskeletal:   no lower extremity pitting edema present                Data:   All laboratory data reviewed

## 2020-02-17 NOTE — PLAN OF CARE
Discharge Planner PT   Patient plan for discharge: did not state today  Current status: Patient in supine upon arrival, agreeable to PT but tearful due to frustration of inability to have BM and continued abdominal discomfort. Patient completed supine to sit with min assist using log roll technique. Sit <> stand transfers with FWW, SBA and cueing for technique. Patient ambulated 300' with FWW and SBA, slow dusty and forward flexed posture. Patient left sitting in chair with alarm activated. Education provided on benefits of mobility and encouraged ambulating 3-4x/day with supervision from staff.  Barriers to return to prior living situation: Lives alone, impaired balance & fall risk, Pain  Recommendations for discharge: TCU  Rationale for recommendations: Patient does not have any assist in the home and currently requiring physical assist for bed mobility and SBA for transfers/gait. Patient would benefit from skilled PT intervention at TCU to maximize independence with functional mobility prior to return home.       Entered by: Milady Barrera 02/17/2020 9:43 AM

## 2020-02-18 ENCOUNTER — ANESTHESIA (OUTPATIENT)
Dept: SURGERY | Facility: CLINIC | Age: 60
DRG: 737 | End: 2020-02-18
Payer: MEDICARE

## 2020-02-18 ENCOUNTER — ANESTHESIA EVENT (OUTPATIENT)
Dept: SURGERY | Facility: CLINIC | Age: 60
DRG: 737 | End: 2020-02-18
Payer: MEDICARE

## 2020-02-18 ENCOUNTER — APPOINTMENT (OUTPATIENT)
Dept: PHYSICAL THERAPY | Facility: CLINIC | Age: 60
DRG: 737 | End: 2020-02-18
Attending: OBSTETRICS & GYNECOLOGY
Payer: MEDICARE

## 2020-02-18 LAB
ALBUMIN SERPL-MCNC: 2 G/DL (ref 3.4–5)
ALP SERPL-CCNC: 250 U/L (ref 40–150)
ALT SERPL W P-5'-P-CCNC: 64 U/L (ref 0–50)
ANION GAP SERPL CALCULATED.3IONS-SCNC: 4 MMOL/L (ref 3–14)
AST SERPL W P-5'-P-CCNC: 57 U/L (ref 0–45)
BACTERIA SPEC CULT: NO GROWTH
BACTERIA SPEC CULT: NO GROWTH
BASOPHILS # BLD AUTO: 0 10E9/L (ref 0–0.2)
BASOPHILS NFR BLD AUTO: 0 %
BILIRUB SERPL-MCNC: 0.2 MG/DL (ref 0.2–1.3)
BUN SERPL-MCNC: 5 MG/DL (ref 7–30)
CALCIUM SERPL-MCNC: 8.2 MG/DL (ref 8.5–10.1)
CHLORIDE SERPL-SCNC: 107 MMOL/L (ref 94–109)
CO2 SERPL-SCNC: 28 MMOL/L (ref 20–32)
CREAT SERPL-MCNC: 0.7 MG/DL (ref 0.52–1.04)
DIFFERENTIAL METHOD BLD: ABNORMAL
EOSINOPHIL # BLD AUTO: 0 10E9/L (ref 0–0.7)
EOSINOPHIL NFR BLD AUTO: 0 %
ERYTHROCYTE [DISTWIDTH] IN BLOOD BY AUTOMATED COUNT: 13.5 % (ref 10–15)
GFR SERPL CREATININE-BSD FRML MDRD: >90 ML/MIN/{1.73_M2}
GLUCOSE SERPL-MCNC: 110 MG/DL (ref 70–99)
HBA1C MFR BLD: 5.8 % (ref 0–5.6)
HCT VFR BLD AUTO: 28.3 % (ref 35–47)
HGB BLD-MCNC: 9.5 G/DL (ref 11.7–15.7)
IMM GRANULOCYTES # BLD: 0 10E9/L (ref 0–0.4)
IMM GRANULOCYTES NFR BLD: 0.8 %
LYMPHOCYTES # BLD AUTO: 1.2 10E9/L (ref 0.8–5.3)
LYMPHOCYTES NFR BLD AUTO: 23 %
MAGNESIUM SERPL-MCNC: 1.9 MG/DL (ref 1.6–2.3)
MCH RBC QN AUTO: 30.3 PG (ref 26.5–33)
MCHC RBC AUTO-ENTMCNC: 33.6 G/DL (ref 31.5–36.5)
MCV RBC AUTO: 90 FL (ref 78–100)
MONOCYTES # BLD AUTO: 0.7 10E9/L (ref 0–1.3)
MONOCYTES NFR BLD AUTO: 13.9 %
NEUTROPHILS # BLD AUTO: 3.1 10E9/L (ref 1.6–8.3)
NEUTROPHILS NFR BLD AUTO: 62.3 %
NRBC # BLD AUTO: 0 10*3/UL
NRBC BLD AUTO-RTO: 0 /100
PHOSPHATE SERPL-MCNC: 3.6 MG/DL (ref 2.5–4.5)
PLATELET # BLD AUTO: 447 10E9/L (ref 150–450)
POTASSIUM SERPL-SCNC: 3.8 MMOL/L (ref 3.4–5.3)
PROT SERPL-MCNC: 5.1 G/DL (ref 6.8–8.8)
RBC # BLD AUTO: 3.14 10E12/L (ref 3.8–5.2)
SODIUM SERPL-SCNC: 139 MMOL/L (ref 133–144)
SPECIMEN SOURCE: NORMAL
SPECIMEN SOURCE: NORMAL
TRIGL SERPL-MCNC: 138 MG/DL
WBC # BLD AUTO: 5 10E9/L (ref 4–11)

## 2020-02-18 PROCEDURE — 12000000 ZZH R&B MED SURG/OB

## 2020-02-18 PROCEDURE — 3E0436Z INTRODUCTION OF NUTRITIONAL SUBSTANCE INTO CENTRAL VEIN, PERCUTANEOUS APPROACH: ICD-10-PCS | Performed by: OBSTETRICS & GYNECOLOGY

## 2020-02-18 PROCEDURE — 25000566 ZZH SEVOFLURANE, EA 15 MIN: Performed by: OBSTETRICS & GYNECOLOGY

## 2020-02-18 PROCEDURE — 36000058 ZZH SURGERY LEVEL 3 EA 15 ADDTL MIN: Performed by: OBSTETRICS & GYNECOLOGY

## 2020-02-18 PROCEDURE — 37000009 ZZH ANESTHESIA TECHNICAL FEE, EACH ADDTL 15 MIN: Performed by: OBSTETRICS & GYNECOLOGY

## 2020-02-18 PROCEDURE — C1765 ADHESION BARRIER: HCPCS | Performed by: OBSTETRICS & GYNECOLOGY

## 2020-02-18 PROCEDURE — 25800030 ZZH RX IP 258 OP 636: Performed by: INTERNAL MEDICINE

## 2020-02-18 PROCEDURE — 36569 INSJ PICC 5 YR+ W/O IMAGING: CPT

## 2020-02-18 PROCEDURE — 83036 HEMOGLOBIN GLYCOSYLATED A1C: CPT | Performed by: OBSTETRICS & GYNECOLOGY

## 2020-02-18 PROCEDURE — 25000132 ZZH RX MED GY IP 250 OP 250 PS 637: Mod: GY | Performed by: OBSTETRICS & GYNECOLOGY

## 2020-02-18 PROCEDURE — 97116 GAIT TRAINING THERAPY: CPT | Mod: GP

## 2020-02-18 PROCEDURE — 25000128 H RX IP 250 OP 636: Performed by: ANESTHESIOLOGY

## 2020-02-18 PROCEDURE — 84478 ASSAY OF TRIGLYCERIDES: CPT | Performed by: NURSE PRACTITIONER

## 2020-02-18 PROCEDURE — 83735 ASSAY OF MAGNESIUM: CPT | Performed by: NURSE PRACTITIONER

## 2020-02-18 PROCEDURE — 25000125 ZZHC RX 250: Performed by: NURSE ANESTHETIST, CERTIFIED REGISTERED

## 2020-02-18 PROCEDURE — 25800030 ZZH RX IP 258 OP 636: Performed by: NURSE ANESTHETIST, CERTIFIED REGISTERED

## 2020-02-18 PROCEDURE — 36000056 ZZH SURGERY LEVEL 3 1ST 30 MIN: Performed by: OBSTETRICS & GYNECOLOGY

## 2020-02-18 PROCEDURE — 84100 ASSAY OF PHOSPHORUS: CPT | Performed by: NURSE PRACTITIONER

## 2020-02-18 PROCEDURE — 25800030 ZZH RX IP 258 OP 636: Performed by: ANESTHESIOLOGY

## 2020-02-18 PROCEDURE — 27210794 ZZH OR GENERAL SUPPLY STERILE: Performed by: OBSTETRICS & GYNECOLOGY

## 2020-02-18 PROCEDURE — 37000008 ZZH ANESTHESIA TECHNICAL FEE, 1ST 30 MIN: Performed by: OBSTETRICS & GYNECOLOGY

## 2020-02-18 PROCEDURE — 85025 COMPLETE CBC W/AUTO DIFF WBC: CPT | Performed by: NURSE PRACTITIONER

## 2020-02-18 PROCEDURE — 80053 COMPREHEN METABOLIC PANEL: CPT | Performed by: NURSE PRACTITIONER

## 2020-02-18 PROCEDURE — 0W9H0ZZ DRAINAGE OF RETROPERITONEUM, OPEN APPROACH: ICD-10-PCS | Performed by: OBSTETRICS & GYNECOLOGY

## 2020-02-18 PROCEDURE — 40000169 ZZH STATISTIC PRE-PROCEDURE ASSESSMENT I: Performed by: OBSTETRICS & GYNECOLOGY

## 2020-02-18 PROCEDURE — 27211441 ZZ H KIT SHRLOCK 5FR POWER PICC TRIPLE LUMEN

## 2020-02-18 PROCEDURE — 83036 HEMOGLOBIN GLYCOSYLATED A1C: CPT | Performed by: NURSE PRACTITIONER

## 2020-02-18 PROCEDURE — 25000125 ZZHC RX 250: Performed by: NURSE PRACTITIONER

## 2020-02-18 PROCEDURE — 25000128 H RX IP 250 OP 636: Performed by: NURSE ANESTHETIST, CERTIFIED REGISTERED

## 2020-02-18 PROCEDURE — 25000128 H RX IP 250 OP 636: Performed by: NURSE PRACTITIONER

## 2020-02-18 PROCEDURE — 40000257 ZZH STATISTIC CONSULT NO CHARGE VASC ACCESS

## 2020-02-18 PROCEDURE — 25800030 ZZH RX IP 258 OP 636: Performed by: OBSTETRICS & GYNECOLOGY

## 2020-02-18 PROCEDURE — 0DN80ZZ RELEASE SMALL INTESTINE, OPEN APPROACH: ICD-10-PCS | Performed by: OBSTETRICS & GYNECOLOGY

## 2020-02-18 PROCEDURE — 71000012 ZZH RECOVERY PHASE 1 LEVEL 1 FIRST HR: Performed by: OBSTETRICS & GYNECOLOGY

## 2020-02-18 PROCEDURE — 36415 COLL VENOUS BLD VENIPUNCTURE: CPT | Performed by: NURSE PRACTITIONER

## 2020-02-18 PROCEDURE — 25000128 H RX IP 250 OP 636: Performed by: OBSTETRICS & GYNECOLOGY

## 2020-02-18 RX ORDER — FENTANYL CITRATE 50 UG/ML
25-50 INJECTION, SOLUTION INTRAMUSCULAR; INTRAVENOUS
Status: DISCONTINUED | OUTPATIENT
Start: 2020-02-18 | End: 2020-02-18 | Stop reason: HOSPADM

## 2020-02-18 RX ORDER — LIDOCAINE 40 MG/G
CREAM TOPICAL
Status: DISCONTINUED | OUTPATIENT
Start: 2020-02-18 | End: 2020-02-27 | Stop reason: HOSPADM

## 2020-02-18 RX ORDER — PROPOFOL 10 MG/ML
INJECTION, EMULSION INTRAVENOUS PRN
Status: DISCONTINUED | OUTPATIENT
Start: 2020-02-18 | End: 2020-02-18

## 2020-02-18 RX ORDER — SODIUM CHLORIDE, SODIUM LACTATE, POTASSIUM CHLORIDE, CALCIUM CHLORIDE 600; 310; 30; 20 MG/100ML; MG/100ML; MG/100ML; MG/100ML
INJECTION, SOLUTION INTRAVENOUS CONTINUOUS
Status: DISCONTINUED | OUTPATIENT
Start: 2020-02-18 | End: 2020-02-18 | Stop reason: HOSPADM

## 2020-02-18 RX ORDER — FENTANYL CITRATE 50 UG/ML
INJECTION, SOLUTION INTRAMUSCULAR; INTRAVENOUS PRN
Status: DISCONTINUED | OUTPATIENT
Start: 2020-02-18 | End: 2020-02-18

## 2020-02-18 RX ORDER — SODIUM CHLORIDE 9 MG/ML
INJECTION, SOLUTION INTRAVENOUS CONTINUOUS PRN
Status: DISCONTINUED | OUTPATIENT
Start: 2020-02-18 | End: 2020-02-18

## 2020-02-18 RX ORDER — NEOSTIGMINE METHYLSULFATE 1 MG/ML
VIAL (ML) INJECTION PRN
Status: DISCONTINUED | OUTPATIENT
Start: 2020-02-18 | End: 2020-02-18

## 2020-02-18 RX ORDER — HYDROMORPHONE HYDROCHLORIDE 1 MG/ML
.3-.5 INJECTION, SOLUTION INTRAMUSCULAR; INTRAVENOUS; SUBCUTANEOUS EVERY 5 MIN PRN
Status: DISCONTINUED | OUTPATIENT
Start: 2020-02-18 | End: 2020-02-18 | Stop reason: HOSPADM

## 2020-02-18 RX ORDER — GLYCOPYRROLATE 0.2 MG/ML
INJECTION, SOLUTION INTRAMUSCULAR; INTRAVENOUS PRN
Status: DISCONTINUED | OUTPATIENT
Start: 2020-02-18 | End: 2020-02-18

## 2020-02-18 RX ORDER — NICOTINE POLACRILEX 4 MG
15-30 LOZENGE BUCCAL
Status: DISCONTINUED | OUTPATIENT
Start: 2020-02-18 | End: 2020-02-24

## 2020-02-18 RX ORDER — DEXTROSE MONOHYDRATE 100 MG/ML
INJECTION, SOLUTION INTRAVENOUS CONTINUOUS PRN
Status: DISCONTINUED | OUTPATIENT
Start: 2020-02-18 | End: 2020-02-24

## 2020-02-18 RX ORDER — NALOXONE HYDROCHLORIDE 0.4 MG/ML
.1-.4 INJECTION, SOLUTION INTRAMUSCULAR; INTRAVENOUS; SUBCUTANEOUS
Status: DISCONTINUED | OUTPATIENT
Start: 2020-02-18 | End: 2020-02-18

## 2020-02-18 RX ORDER — ONDANSETRON 2 MG/ML
INJECTION INTRAMUSCULAR; INTRAVENOUS PRN
Status: DISCONTINUED | OUTPATIENT
Start: 2020-02-18 | End: 2020-02-18

## 2020-02-18 RX ORDER — SODIUM CHLORIDE, SODIUM LACTATE, POTASSIUM CHLORIDE, CALCIUM CHLORIDE 600; 310; 30; 20 MG/100ML; MG/100ML; MG/100ML; MG/100ML
INJECTION, SOLUTION INTRAVENOUS CONTINUOUS PRN
Status: DISCONTINUED | OUTPATIENT
Start: 2020-02-18 | End: 2020-02-18

## 2020-02-18 RX ORDER — DEXTROSE MONOHYDRATE 25 G/50ML
25-50 INJECTION, SOLUTION INTRAVENOUS
Status: DISCONTINUED | OUTPATIENT
Start: 2020-02-18 | End: 2020-02-24

## 2020-02-18 RX ORDER — ONDANSETRON 4 MG/1
4 TABLET, ORALLY DISINTEGRATING ORAL EVERY 30 MIN PRN
Status: DISCONTINUED | OUTPATIENT
Start: 2020-02-18 | End: 2020-02-18 | Stop reason: HOSPADM

## 2020-02-18 RX ORDER — LIDOCAINE HYDROCHLORIDE 20 MG/ML
INJECTION, SOLUTION INFILTRATION; PERINEURAL PRN
Status: DISCONTINUED | OUTPATIENT
Start: 2020-02-18 | End: 2020-02-18

## 2020-02-18 RX ORDER — HEPARIN SODIUM,PORCINE 10 UNIT/ML
2-5 VIAL (ML) INTRAVENOUS
Status: DISCONTINUED | OUTPATIENT
Start: 2020-02-18 | End: 2020-02-24

## 2020-02-18 RX ORDER — ONDANSETRON 2 MG/ML
4 INJECTION INTRAMUSCULAR; INTRAVENOUS EVERY 30 MIN PRN
Status: DISCONTINUED | OUTPATIENT
Start: 2020-02-18 | End: 2020-02-18 | Stop reason: HOSPADM

## 2020-02-18 RX ORDER — DEXAMETHASONE SODIUM PHOSPHATE 4 MG/ML
INJECTION, SOLUTION INTRA-ARTICULAR; INTRALESIONAL; INTRAMUSCULAR; INTRAVENOUS; SOFT TISSUE PRN
Status: DISCONTINUED | OUTPATIENT
Start: 2020-02-18 | End: 2020-02-18

## 2020-02-18 RX ADMIN — LIDOCAINE HYDROCHLORIDE 80 MG: 20 INJECTION, SOLUTION INFILTRATION; PERINEURAL at 19:42

## 2020-02-18 RX ADMIN — POTASSIUM CHLORIDE, DEXTROSE MONOHYDRATE AND SODIUM CHLORIDE: 150; 5; 450 INJECTION, SOLUTION INTRAVENOUS at 22:23

## 2020-02-18 RX ADMIN — ATORVASTATIN CALCIUM 80 MG: 80 TABLET, FILM COATED ORAL at 22:50

## 2020-02-18 RX ADMIN — HYDROMORPHONE HYDROCHLORIDE 0.5 MG: 1 INJECTION, SOLUTION INTRAMUSCULAR; INTRAVENOUS; SUBCUTANEOUS at 20:25

## 2020-02-18 RX ADMIN — Medication 1 MG: at 22:50

## 2020-02-18 RX ADMIN — SUCCINYLCHOLINE CHLORIDE 100 MG: 20 INJECTION, SOLUTION INTRAMUSCULAR; INTRAVENOUS; PARENTERAL at 19:42

## 2020-02-18 RX ADMIN — LIDOCAINE HYDROCHLORIDE 2 ML: 10 INJECTION, SOLUTION INFILTRATION; PERINEURAL at 11:50

## 2020-02-18 RX ADMIN — ONDANSETRON 4 MG: 2 INJECTION INTRAMUSCULAR; INTRAVENOUS at 20:16

## 2020-02-18 RX ADMIN — ONDANSETRON 4 MG: 2 INJECTION INTRAMUSCULAR; INTRAVENOUS at 10:22

## 2020-02-18 RX ADMIN — HEPARIN SODIUM 5000 UNITS: 5000 INJECTION, SOLUTION INTRAVENOUS; SUBCUTANEOUS at 01:59

## 2020-02-18 RX ADMIN — SODIUM CHLORIDE, POTASSIUM CHLORIDE, SODIUM LACTATE AND CALCIUM CHLORIDE: 600; 310; 30; 20 INJECTION, SOLUTION INTRAVENOUS at 19:25

## 2020-02-18 RX ADMIN — HYDROMORPHONE HYDROCHLORIDE 0.2 MG: 1 INJECTION, SOLUTION INTRAMUSCULAR; INTRAVENOUS; SUBCUTANEOUS at 10:22

## 2020-02-18 RX ADMIN — GLYCOPYRROLATE 0.7 MG: 0.2 INJECTION, SOLUTION INTRAMUSCULAR; INTRAVENOUS at 20:23

## 2020-02-18 RX ADMIN — POTASSIUM CHLORIDE, DEXTROSE MONOHYDRATE AND SODIUM CHLORIDE: 150; 5; 450 INJECTION, SOLUTION INTRAVENOUS at 14:28

## 2020-02-18 RX ADMIN — MIDAZOLAM 2 MG: 1 INJECTION INTRAMUSCULAR; INTRAVENOUS at 19:42

## 2020-02-18 RX ADMIN — SODIUM CHLORIDE, POTASSIUM CHLORIDE, SODIUM LACTATE AND CALCIUM CHLORIDE: 600; 310; 30; 20 INJECTION, SOLUTION INTRAVENOUS at 19:36

## 2020-02-18 RX ADMIN — POTASSIUM CHLORIDE, DEXTROSE MONOHYDRATE AND SODIUM CHLORIDE: 150; 5; 450 INJECTION, SOLUTION INTRAVENOUS at 03:04

## 2020-02-18 RX ADMIN — PROPOFOL 200 MG: 10 INJECTION, EMULSION INTRAVENOUS at 19:42

## 2020-02-18 RX ADMIN — HYDROMORPHONE HYDROCHLORIDE 0.3 MG: 1 INJECTION, SOLUTION INTRAMUSCULAR; INTRAVENOUS; SUBCUTANEOUS at 21:42

## 2020-02-18 RX ADMIN — HYDROMORPHONE HYDROCHLORIDE 0.5 MG: 1 INJECTION, SOLUTION INTRAMUSCULAR; INTRAVENOUS; SUBCUTANEOUS at 20:04

## 2020-02-18 RX ADMIN — SODIUM CHLORIDE: 9 INJECTION, SOLUTION INTRAVENOUS at 19:50

## 2020-02-18 RX ADMIN — DEXAMETHASONE SODIUM PHOSPHATE 4 MG: 4 INJECTION, SOLUTION INTRA-ARTICULAR; INTRALESIONAL; INTRAMUSCULAR; INTRAVENOUS; SOFT TISSUE at 19:53

## 2020-02-18 RX ADMIN — FENTANYL CITRATE 100 MCG: 50 INJECTION, SOLUTION INTRAMUSCULAR; INTRAVENOUS at 19:42

## 2020-02-18 RX ADMIN — NEOSTIGMINE METHYLSULFATE 4.5 MG: 1 INJECTION, SOLUTION INTRAVENOUS at 20:23

## 2020-02-18 RX ADMIN — HYDROMORPHONE HYDROCHLORIDE 0.2 MG: 1 INJECTION, SOLUTION INTRAMUSCULAR; INTRAVENOUS; SUBCUTANEOUS at 21:47

## 2020-02-18 RX ADMIN — ROCURONIUM BROMIDE 50 MG: 10 INJECTION INTRAVENOUS at 19:52

## 2020-02-18 RX ADMIN — CARBAMAZEPINE 400 MG: 200 CAPSULE, EXTENDED RELEASE ORAL at 22:50

## 2020-02-18 ASSESSMENT — ACTIVITIES OF DAILY LIVING (ADL)
ADLS_ACUITY_SCORE: 14

## 2020-02-18 ASSESSMENT — ENCOUNTER SYMPTOMS: SEIZURES: 1

## 2020-02-18 NOTE — PROGRESS NOTES
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 Will need chemotherapy in adjuvant setting once recovered from surgery.  Will need port placed. Either upon discharge or in outpatient setting.       Postoperative SBO:  NPO.  Discussed with patient.  Given no improvement in exam and transition point on CT, I feel she needs surgery to correct SBO that is likely due to adhesive disease in pelvis.  Need PICC line and TPN started to day     Leukocytosis: Resolved.  WBC back to normal. BC's and UC NGTD. On Zosyn.      SOO/Oliguria: resolved. Nephrology signed off. Monitor.      Fluid overload: gentle diuresis today.       DVT Prophylaxis:  Continue on heparin for now due to SOO. Will Rx lovenox on discharge x 28 days. Will need teaching prior to discharge.      Epilepsy:  Continue Carbamazepine     Hyperlipidemia:  On Atorvastatin     HTN: Losartan on hold     Disposition:  OT/PT/SW following. Patient ok with TCU.  to arrange for placement upon resolution of SBO.                         Medications:   I have reviewed this patient's current medications               Physical Exam:   Blood pressure (!) 160/72, pulse 81, temperature 98.1  F (36.7  C), temperature source Oral, resp. rate 17, height 1.727 m (5' 8\"), weight 85.3 kg (188 lb 1.6 oz), SpO2 94 %.        Vital Sign Ranges  Temperature Temp  Av.7  F (36.5  C)  Min: 97  F (36.1  C)  Max: 98.3  F (36.8  C)   Blood pressure Systolic (24hrs), Av , Min:138 , Max:171        Diastolic (24hrs), Av, Min:68, Max:86      Pulse Pulse  Av  Min: 81  Max: 81   Respirations Resp  Av.8  Min: 16  Max: 17   Pulse oximetry SpO2  Av.3 %  Min: 94 %  Max: 97 %         Intake/Output " Summary (Last 24 hours) at 2/18/2020 0910  Last data filed at 2/18/2020 0304  Gross per 24 hour   Intake 1022 ml   Output 300 ml   Net 722 ml       Lungs:   Diminished BS at bases bilaterally     Cardiovascular:   normal apical pulses      Abdomen:   Distended, minimal BS, tympanetic                Data:   All laboratory data reviewed

## 2020-02-18 NOTE — PLAN OF CARE
PT- Attempted to see pt this AM. Pt agreeable to therapy but nursing staff needed to place IV. Pt deferred.

## 2020-02-18 NOTE — CONSULTS
"Clinical Nutrition Services - Brief Note    New Findings:   Per Physician note on 2/18:   \"Postoperative SBO:  NPO.  Discussed with patient.  Given no improvement in exam and transition point on CT, I feel she needs surgery to correct SBO that is likely due to adhesive disease in pelvis.  Need PICC line and TPN started to day\"    K+ 3.8, Mg++ 1.9, Phos 3.6 (WNL)  BUN 5 (L), Cr 0.70    D5% running @ 100 ml/hr (120 g CHO, 408 kcal)     Interventions:  Initiate Clinimix D15 AA5 via central line @ 50 ml/hr for 24 hrs and then increase to goal of 80 ml/hr to provide 288g Dex daily (979kcal), 96g AA daily (384kcal) and 250 ml of 20% IV lipids daily = 500 kcals/day, 1.4 g PRO/kg/day, GIR 2.98 with 27% kcals from Fat.  Total kcal: 1863 (28 kcal/kg) Micro/Rx: Per Pharm.    Decrease D5% to 20 ml/hr (24g, 82 kcal).    Sarah Summers  Dietetic Intern              "

## 2020-02-18 NOTE — PROGRESS NOTES
SPIRITUAL HEALTH SERVICES Progress Note  [facility]    Brief follow-up visit with pt, whom I met last week.  Pt was sitting in a chair, and didn't seem to be feeling well today.  She said that she's having troubles, and is going to have more surgery this afternoon.  She specifically noted pain and discomfort in her back.  Pt was unable to engage in much conversation due to her discomfort, but agreed to my offer of prayer.  SH team will continue to follow for support.                                                                                                                                               Sierra Pablo M.A.  Staff   Pager [phone]  Phone [phone]

## 2020-02-18 NOTE — PLAN OF CARE
Discharge Planner PT   Patient plan for discharge: did not state today  Current status: Pt up with nursing staff at arrival, pt agreeable to ambulate with PT. Gait training 700'+ with FWW and CGA progressing to SBA. Following gait pt transferred to chair with SBA, heat pack placed on low back. Pt left sitting with alarm on and needs in reach.   Barriers to return to prior living situation: Lives alone, impaired balance & fall risk, Pain  Recommendations for discharge: TCU per plan established by the PT.  Rationale for recommendations: Patient does not have any assist in the home and currently requiring physical assist for bed mobility and SBA for transfers/gait. Patient would benefit from skilled PT intervention at TCU to maximize independence with functional mobility prior to return home.       Entered by: Ayleen Munson 02/18/2020 1:49 PM

## 2020-02-18 NOTE — PROGRESS NOTES
Right upper arm picc line placed per order for TPN.  Line ready for immediate use.  Patient tolerated procedure well.

## 2020-02-18 NOTE — PLAN OF CARE
AOx4, VSS. SBA w/ GB. Ambulated in halls x1 this shift. Hypo BS, -gas. Abd distended, abd binder in place. Midline incision, closed with staples. C/o pain, ibuprofen given, trying to avoid opioids due to constipation. CT done today, see results. Clear liquid diet, has difficulty swallowing. Plan for TPN feedings to start tomorrow. PIV running IVF @100. Possible port placement prior to discharge for outpatient chemo; PICC line for TPN(unless the port can be used instead for TPN). Discharge pending.

## 2020-02-18 NOTE — PLAN OF CARE
A&O. VSS. Attempting to avoid narcotics as has partial SBO but had to give norco x1 as non opioids were not effective and having severe pain in abdomen. Pt had large episode of emesis after taking medications, then stated her throat pain and abdominal pain felt much better. Given zofran x1. Monitoring nausea as pt appears to have difficultly explaining her symptoms in subjective terms (stated having throat pain, but after having emesis realized it was not pain but nausea attributing to significant difficulty swallowing).  Abdomen still distended, BS hypoactive with tinkling sounds in lower quadrants. Per pt, had 1-2 small episodes of flatus. Nursing still observing belching. Abdominal incision KAMLA w/ staples, abdominal binder/ w ice for comfort. Pt will need TPN started today if bowels still not active, PICC vs. Port placement.

## 2020-02-19 ENCOUNTER — APPOINTMENT (OUTPATIENT)
Dept: GENERAL RADIOLOGY | Facility: CLINIC | Age: 60
DRG: 737 | End: 2020-02-19
Attending: OBSTETRICS & GYNECOLOGY
Payer: MEDICARE

## 2020-02-19 LAB
ALBUMIN SERPL-MCNC: 1.9 G/DL (ref 3.4–5)
ALP SERPL-CCNC: 221 U/L (ref 40–150)
ALT SERPL W P-5'-P-CCNC: 72 U/L (ref 0–50)
ANION GAP SERPL CALCULATED.3IONS-SCNC: 3 MMOL/L (ref 3–14)
AST SERPL W P-5'-P-CCNC: 74 U/L (ref 0–45)
BILIRUB DIRECT SERPL-MCNC: 0.1 MG/DL (ref 0–0.2)
BILIRUB SERPL-MCNC: 0.2 MG/DL (ref 0.2–1.3)
BUN SERPL-MCNC: 6 MG/DL (ref 7–30)
CALCIUM SERPL-MCNC: 7.8 MG/DL (ref 8.5–10.1)
CHLORIDE SERPL-SCNC: 111 MMOL/L (ref 94–109)
CO2 SERPL-SCNC: 26 MMOL/L (ref 20–32)
CREAT SERPL-MCNC: 0.56 MG/DL (ref 0.52–1.04)
ERYTHROCYTE [DISTWIDTH] IN BLOOD BY AUTOMATED COUNT: 13.7 % (ref 10–15)
GFR SERPL CREATININE-BSD FRML MDRD: >90 ML/MIN/{1.73_M2}
GLUCOSE BLDC GLUCOMTR-MCNC: 134 MG/DL (ref 70–99)
GLUCOSE BLDC GLUCOMTR-MCNC: 140 MG/DL (ref 70–99)
GLUCOSE BLDC GLUCOMTR-MCNC: 155 MG/DL (ref 70–99)
GLUCOSE BLDC GLUCOMTR-MCNC: 161 MG/DL (ref 70–99)
GLUCOSE SERPL-MCNC: 136 MG/DL (ref 70–99)
HCT VFR BLD AUTO: 30.8 % (ref 35–47)
HGB BLD-MCNC: 10 G/DL (ref 11.7–15.7)
INR PPP: 1.24 (ref 0.86–1.14)
INR PPP: 1.35 (ref 0.86–1.14)
MAGNESIUM SERPL-MCNC: 1.8 MG/DL (ref 1.6–2.3)
MCH RBC QN AUTO: 29.9 PG (ref 26.5–33)
MCHC RBC AUTO-ENTMCNC: 32.5 G/DL (ref 31.5–36.5)
MCV RBC AUTO: 92 FL (ref 78–100)
PHOSPHATE SERPL-MCNC: 2.9 MG/DL (ref 2.5–4.5)
PLATELET # BLD AUTO: 469 10E9/L (ref 150–450)
POTASSIUM SERPL-SCNC: 3.7 MMOL/L (ref 3.4–5.3)
PREALB SERPL IA-MCNC: 17 MG/DL (ref 15–45)
PROT SERPL-MCNC: 4.9 G/DL (ref 6.8–8.8)
RBC # BLD AUTO: 3.35 10E12/L (ref 3.8–5.2)
SODIUM SERPL-SCNC: 140 MMOL/L (ref 133–144)
WBC # BLD AUTO: 7.1 10E9/L (ref 4–11)

## 2020-02-19 PROCEDURE — 84134 ASSAY OF PREALBUMIN: CPT | Performed by: OBSTETRICS & GYNECOLOGY

## 2020-02-19 PROCEDURE — 85610 PROTHROMBIN TIME: CPT | Performed by: OBSTETRICS & GYNECOLOGY

## 2020-02-19 PROCEDURE — 25800030 ZZH RX IP 258 OP 636: Performed by: OBSTETRICS & GYNECOLOGY

## 2020-02-19 PROCEDURE — 25000125 ZZHC RX 250: Performed by: OBSTETRICS & GYNECOLOGY

## 2020-02-19 PROCEDURE — 85027 COMPLETE CBC AUTOMATED: CPT | Performed by: OBSTETRICS & GYNECOLOGY

## 2020-02-19 PROCEDURE — 12000000 ZZH R&B MED SURG/OB

## 2020-02-19 PROCEDURE — 25000132 ZZH RX MED GY IP 250 OP 250 PS 637: Mod: GY | Performed by: OBSTETRICS & GYNECOLOGY

## 2020-02-19 PROCEDURE — 25000128 H RX IP 250 OP 636: Performed by: NURSE PRACTITIONER

## 2020-02-19 PROCEDURE — 25000131 ZZH RX MED GY IP 250 OP 636 PS 637: Mod: GY | Performed by: OBSTETRICS & GYNECOLOGY

## 2020-02-19 PROCEDURE — 83735 ASSAY OF MAGNESIUM: CPT | Performed by: OBSTETRICS & GYNECOLOGY

## 2020-02-19 PROCEDURE — 74018 RADEX ABDOMEN 1 VIEW: CPT

## 2020-02-19 PROCEDURE — 00000146 ZZHCL STATISTIC GLUCOSE BY METER IP

## 2020-02-19 PROCEDURE — 84100 ASSAY OF PHOSPHORUS: CPT | Performed by: OBSTETRICS & GYNECOLOGY

## 2020-02-19 PROCEDURE — 25000128 H RX IP 250 OP 636: Performed by: OBSTETRICS & GYNECOLOGY

## 2020-02-19 PROCEDURE — 82248 BILIRUBIN DIRECT: CPT | Performed by: OBSTETRICS & GYNECOLOGY

## 2020-02-19 PROCEDURE — 40000239 ZZH STATISTIC VAT ROUNDS

## 2020-02-19 PROCEDURE — 40000986 XR CHEST PORT 1 VW

## 2020-02-19 PROCEDURE — 80053 COMPREHEN METABOLIC PANEL: CPT | Performed by: OBSTETRICS & GYNECOLOGY

## 2020-02-19 RX ORDER — DEXTROSE MONOHYDRATE, SODIUM CHLORIDE, AND POTASSIUM CHLORIDE 50; 1.49; 4.5 G/1000ML; G/1000ML; G/1000ML
INJECTION, SOLUTION INTRAVENOUS CONTINUOUS
Status: DISCONTINUED | OUTPATIENT
Start: 2020-02-19 | End: 2020-02-19

## 2020-02-19 RX ORDER — DEXTROSE MONOHYDRATE, SODIUM CHLORIDE, AND POTASSIUM CHLORIDE 50; 1.49; 4.5 G/1000ML; G/1000ML; G/1000ML
INJECTION, SOLUTION INTRAVENOUS CONTINUOUS
Status: DISCONTINUED | OUTPATIENT
Start: 2020-02-19 | End: 2020-02-23

## 2020-02-19 RX ORDER — FUROSEMIDE 10 MG/ML
40 INJECTION INTRAMUSCULAR; INTRAVENOUS ONCE
Status: COMPLETED | OUTPATIENT
Start: 2020-02-19 | End: 2020-02-19

## 2020-02-19 RX ORDER — HYDROMORPHONE HYDROCHLORIDE 1 MG/ML
0.3 INJECTION, SOLUTION INTRAMUSCULAR; INTRAVENOUS; SUBCUTANEOUS
Status: DISCONTINUED | OUTPATIENT
Start: 2020-02-19 | End: 2020-02-27 | Stop reason: HOSPADM

## 2020-02-19 RX ORDER — KETOROLAC TROMETHAMINE 30 MG/ML
30 INJECTION, SOLUTION INTRAMUSCULAR; INTRAVENOUS EVERY 6 HOURS PRN
Status: DISPENSED | OUTPATIENT
Start: 2020-02-19 | End: 2020-02-24

## 2020-02-19 RX ADMIN — KETOROLAC TROMETHAMINE 30 MG: 30 INJECTION, SOLUTION INTRAMUSCULAR at 17:01

## 2020-02-19 RX ADMIN — HYDROMORPHONE HYDROCHLORIDE 0.2 MG: 1 INJECTION, SOLUTION INTRAMUSCULAR; INTRAVENOUS; SUBCUTANEOUS at 04:43

## 2020-02-19 RX ADMIN — Medication 1 MG: at 22:12

## 2020-02-19 RX ADMIN — HYDROCODONE BITARTRATE AND ACETAMINOPHEN 1 TABLET: 5; 325 TABLET ORAL at 05:53

## 2020-02-19 RX ADMIN — ASCORBIC ACID, VITAMIN A PALMITATE, CHOLECALCIFEROL, THIAMINE HYDROCHLORIDE, RIBOFLAVIN-5 PHOSPHATE SODIUM, PYRIDOXINE HYDROCHLORIDE, NIACINAMIDE, DEXPANTHENOL, ALPHA-TOCOPHEROL ACETATE, VITAMIN K1, FOLIC ACID, BIOTIN, CYANOCOBALAMIN: 200; 3300; 200; 6; 3.6; 6; 40; 15; 10; 150; 600; 60; 5 INJECTION, SOLUTION INTRAVENOUS at 01:24

## 2020-02-19 RX ADMIN — ATORVASTATIN CALCIUM 80 MG: 80 TABLET, FILM COATED ORAL at 21:59

## 2020-02-19 RX ADMIN — KETOROLAC TROMETHAMINE 30 MG: 30 INJECTION, SOLUTION INTRAMUSCULAR at 23:19

## 2020-02-19 RX ADMIN — INSULIN ASPART 1 UNITS: 100 INJECTION, SOLUTION INTRAVENOUS; SUBCUTANEOUS at 22:09

## 2020-02-19 RX ADMIN — POTASSIUM CHLORIDE, DEXTROSE MONOHYDRATE AND SODIUM CHLORIDE: 150; 5; 450 INJECTION, SOLUTION INTRAVENOUS at 09:56

## 2020-02-19 RX ADMIN — INSULIN ASPART 1 UNITS: 100 INJECTION, SOLUTION INTRAVENOUS; SUBCUTANEOUS at 19:02

## 2020-02-19 RX ADMIN — ENOXAPARIN SODIUM 40 MG: 40 INJECTION SUBCUTANEOUS at 08:17

## 2020-02-19 RX ADMIN — POTASSIUM CHLORIDE, DEXTROSE MONOHYDRATE AND SODIUM CHLORIDE: 150; 5; 450 INJECTION, SOLUTION INTRAVENOUS at 20:06

## 2020-02-19 RX ADMIN — KETOROLAC TROMETHAMINE 30 MG: 30 INJECTION, SOLUTION INTRAMUSCULAR at 10:18

## 2020-02-19 RX ADMIN — HYDROMORPHONE HYDROCHLORIDE 0.3 MG: 1 INJECTION, SOLUTION INTRAMUSCULAR; INTRAVENOUS; SUBCUTANEOUS at 09:34

## 2020-02-19 RX ADMIN — FUROSEMIDE 40 MG: 10 INJECTION, SOLUTION INTRAMUSCULAR; INTRAVENOUS at 08:16

## 2020-02-19 RX ADMIN — CARBAMAZEPINE 400 MG: 200 CAPSULE, EXTENDED RELEASE ORAL at 21:59

## 2020-02-19 RX ADMIN — HYDROMORPHONE HYDROCHLORIDE 0.3 MG: 1 INJECTION, SOLUTION INTRAMUSCULAR; INTRAVENOUS; SUBCUTANEOUS at 20:03

## 2020-02-19 ASSESSMENT — ACTIVITIES OF DAILY LIVING (ADL)
ADLS_ACUITY_SCORE: 14
ADLS_ACUITY_SCORE: 13
ADLS_ACUITY_SCORE: 14
ADLS_ACUITY_SCORE: 13

## 2020-02-19 NOTE — ANESTHESIA POSTPROCEDURE EVALUATION
Patient: Charlene Dia    Procedure(s):  EXPLORATOMY LAPAROTOMY AND LYSIS OF ADHESIONS    Diagnosis:Bowel obstruction (H) [K56.609]  Diagnosis Additional Information: No value filed.    Anesthesia Type:  General, ETT, RSI    Note:  Anesthesia Post Evaluation    Patient location during evaluation: PACU  Patient participation: Able to fully participate in evaluation  Level of consciousness: awake and alert  Pain management: satisfactory to patient  Airway patency: patent  Cardiovascular status: hemodynamically stable  Respiratory status: acceptable and unassisted  Hydration status: balanced  PONV: none     Anesthetic complications: None          Last vitals:  Vitals:    02/19/20 0124 02/19/20 0200 02/19/20 0237   BP: 136/50     Pulse:      Resp: 17     Temp: 37.2  C (98.9  F)     SpO2: 95% 94% 90%         Electronically Signed By: Randolph Turk MD  February 19, 2020  5:53 AM

## 2020-02-19 NOTE — PROGRESS NOTES
"[facility]  Hospitalist Progress Note          Assessment and Plan:    1) Clear cell left ovarian cancer- Stage IIC (path to addend report to reflect this) POD 7 X-lap, radical resection of left adnexal tumor with TAHBSO, left ureterolysis, omentectomy, left pelvic and para-aortic lymphadenectomy. Will need chemotherapy in adjuvant setting once recovered from surgery.  Will need port placement.  Pt's family has declined in patient placement, prefers to wait.   Has post op tx planning visit already scheduled for 3/6/20 at 4:00 in [facility] office of Dr. Bryson.      Postoperative SBO:  She was taken back to OR last night to correct bowel adhesion.  Tolerated procedure well with good VS this AM and stable labs, but quite a bit of abdominal pain, controlled with IV dilaudid and oral hydrocodone.  She remains NPO with NG tube in place, PICC line infusing TPN.  Perez is back in, as she is not yet mobile.  No BS as yet.     Post operative pain:  On oral hydrocodone and IV Dilaudid.  C/O significant pain with movement.  Increase dilaudid to 0.3mg q 2 hours.      Leukocytosis: Resolved.  WBC back to normal. BC's and UC NGTD. Off Zosyn.     SOO/Oliguria: resolved. Nephrology signed off. Monitor.      Fluid overload: 6700 mL positive fluid balance.  Diuresis today.       DVT Prophylaxis:  Continue on heparin for now due to SOO. Will Rx lovenox on discharge x 28 days. Will need teaching prior to discharge.      Epilepsy:  Continue Carbamazepine     Hyperlipidemia:  On Atorvastatin     HTN: Losartan on hold     Disposition:  OT/PT/SW following. Patient ok with TCU.  to arrange for placement upon resolution of SBO.                         Medications:   I have reviewed this patient's current medications               Physical Exam:   Blood pressure (!) 150/60, pulse 65, temperature 97.9  F (36.6  C), temperature source Oral, resp. rate 20, height 1.727 m (5' 8\"), weight 85.3 kg (188 lb 1.6 oz), SpO2 94 " %.        Vital Sign Ranges  Temperature Temp  Av.7  F (36.5  C)  Min: 97  F (36.1  C)  Max: 98.3  F (36.8  C)   Blood pressure Systolic (24hrs), Av , Min:138 , Max:171        Diastolic (24hrs), Av, Min:68, Max:86      Pulse Pulse  Av  Min: 81  Max: 81   Respirations Resp  Av.8  Min: 16  Max: 17   Pulse oximetry SpO2  Av.3 %  Min: 94 %  Max: 97 %         Intake/Output Summary (Last 24 hours) at 2020 0910  Last data filed at 2020 0304  Gross per 24 hour   Intake 1022 ml   Output 300 ml   Net 722 ml     Lungs:   Diminished BS at bases bilaterally, but w/out crackles.      Cardiovascular:   normal apical pulses      Abdomen:   Very tender, minimally distended, no BS, not tympanetic                Data:   All laboratory data reviewed

## 2020-02-19 NOTE — OP NOTE
Procedure Date: 02/18/2020      PREOPERATIVE DIAGNOSIS:  Small bowel obstruction.  Recent diagnosis left ovarian cancer.      POSTOPERATIVE DIAGNOSIS:  Small bowel obstruction, Recent  diagnosis left ovarian cancer.       SURGEON:  Paula Bryson MD       ANESTHESIA:  General endotracheal anesthesia.      PROCEDURES:  Exploratory laparotomy and enterolysis      INDICATIONS FOR THE PROCEDURE:  The patient is a 59-year-old female who was brought to the operating room 8 days ago on 02/10/2020.  She underwent exploratory laparotomy, radical resection of pelvic tumor with ADELE/BSO, left ureterolysis, excision of left cul-de-sac and pelvic peritoneum, omentectomy and left pelvic and paraaortic lymphadenectomy.  Her postoperative course was complicated by postop day #1-2, hypotension with elevation in her white count to 19,000 and elevation in her creatinine with oliguria.  She was placed on Zosyn.  Hospitalist consult was obtained and Nephrology consult was obtained.  She was given more fluids and had correction of her creatinine quite quickly.  On Zosyn, her white blood cell count eventually normalized over the next 2-3 days.  She did have evidence of an ileus quite early on in period and we were very slow to feed her when she became distended and tympanitic with hypoactive bowel sounds.  Because of continuation of these symptoms over the weekend, she was sent for CT scan yesterday afternoon and this showed evidence of a small-bowel obstruction with a dilated mid and proximal small bowel with a decompressed distal ileum in the pelvis.  Because of this and because this is 8 days after her original surgery, I felt that it was probably due to adhesions and kinking of the small bowel in the pelvis.  I elected to take her back to the operating room for exploratory laparotomy and enterolysis. Consent was obtained from both her and her brother.      FINDINGS:  The patient was found to have a dilated proximal and mid small  bowel.  There was a transition point in the pelvis where the bowel was stuck to the presacral peritoneum.      PROCEDURE IN DETAIL:  The patient was taken to the operating room.  General endotracheal anesthesia was administered in the usual fashion.  Once intubated, she was left in supine position.  She was prepped with ChloraPrep on the abdomen.  She was draped in the usual sterile fashion.  A timeout was conducted and everyone agreed upon the procedure.  I removed the staples and opened up the incision.  I removed the previously placed sutures holding the fascia together.  I made my way into the abdomen.  She had a moderate amount of free pelvic fluid from her surgery.  This was aspirated.  She had loops of dilated small bowel.  I went from the ligament of Treitz and worked my way down the bowel, lysing up any adhesions between loops of bowel and to the anterior abdominal wall.  Once we reached the pelvis, there was an area of kinking of the small bowel where it was adherent to the presacral peritoneum and clearly this was a transition point with the bowel beyond that, most of the ileum being decompressed all the way to the ileocecal valve.  Once we had lysed up the adhesions, I irrigated the pelvis out.  I placed Seprafilm in the pelvis again and overlying the bowel underneath the incision.  I had anesthesia place an OG at the time of surgery, but this was switched to an NG and the proper position along the greater curvature of the stomach was appreciated intraoperatively.  I then closed the abdomen with #1 looped PDS suture from superior and inferior aspects to the midline.  The subcutaneous tissue was irrigated and the skin was reapproximated with staples.  An island dressing was placed over the incision and the binder was replaced.  I did at that point because she had a distended bladder during surgery did place a 16-Italian Perez catheter into her bladder in a sterile fashion.  Her panties were elevated up  around her hips with the Perez secured in them.  Her anesthesia was reversed.  She was extubated and taken to recovery room in stable condition.      ESTIMATED BLOOD LOSS:  5 mL.         GARY MESSER MD             D: 2020   T: 2020   MT: HEMANT      Name:     JACQUELIN BOLDEN   MRN:      [id number]        Account:        [id number]   :      1960           Procedure Date: 2020      Document: [id number]

## 2020-02-19 NOTE — ANESTHESIA PREPROCEDURE EVALUATION
Anesthesia Pre-Procedure Evaluation    Patient: Charlene Dia   MRN: [id number] : 1960          Preoperative Diagnosis: Bowel obstruction (H) [K56.609]    Procedure(s):  EXPLORATOMY LAPAROTOMY.    Past Medical History:   Diagnosis Date     Abdominal lump, pelvic mass      Anxiety 2012     Asthma, intermittent controlled      CARDIOVASCULAR SCREENING; LDL GOAL LESS THAN 130      Chronic neck pain      Chronic tension type headache      Cough      Epilepsy (H)      Epilepsy (H)      Hyperlipidemia      Hypertension      Insomnia      Insomnia      Mental developmental delay      Neurotic excoriations      Ovarian cancer (H)      Pain in both hands      Past Surgical History:   Procedure Laterality Date     CYSTECTOMY OVARIAN BENIGN      right     EYE SURGERY      hx cornea transplant     FOOT SURGERY       HYSTERECTOMY TOTAL ABDOMINAL, BILATERAL SALPINGO-OOPHORECTOMY, NODE DISSECTION, COMBINED N/A 2/10/2020    Procedure: TOTAL ABDOMINAL HYSTERECTOMY,  WITH BILATERAL SALPINGO-OOPHORECTOMY, TUMOR DEBULKING, OMENTECTOMY, PELVIC PARA  AORTIC LYMPHADENECTOMY, LEFT URETERAL LYSIS;  Surgeon: Paula Bryson MD;  Location:  OR     KERATOPLASTY (LAMELLAR GRAFT)  , ', ', 02     LAPAROTOMY, STAGING, COMBINED N/A 2/10/2020    Procedure: EXPLORATORY LAPAROTOMY;  Surgeon: Paula Bryson MD;  Location:  OR       Anesthesia Evaluation     . Pt has had prior anesthetic.     No history of anesthetic complications          ROS/MED HX    ENT/Pulmonary:     (+)Intermittent asthma , recent URI . .   (-) sleep apnea   Neurologic:     (+)seizures     Cardiovascular:     (+) Dyslipidemia, hypertension----. : . . . :. .       METS/Exercise Tolerance:     Hematologic:         Musculoskeletal:         GI/Hepatic: Comment: SBO       (-) GERD   Renal/Genitourinary:     (+) Other Renal/ Genitourinary, pelvic mass, hydronephrosis      Endo:  - neg endo ROS       Psychiatric:     (+) psychiatric history  "anxiety      Infectious Disease:  - neg infectious disease ROS       Malignancy:   (+) Malignancy History of Other  Other CA ovarian CA Active status post Surgery         Other:                          Physical Exam  Normal systems: cardiovascular, pulmonary and dental    Airway   Mallampati: II  TM distance: >3 FB  Neck ROM: full    Dental     Cardiovascular       Pulmonary             Lab Results   Component Value Date    WBC 5.0 02/18/2020    HGB 9.5 (L) 02/18/2020    HCT 28.3 (L) 02/18/2020     02/18/2020     02/18/2020    POTASSIUM 3.8 02/18/2020    CHLORIDE 107 02/18/2020    CO2 28 02/18/2020    BUN 5 (L) 02/18/2020    CR 0.70 02/18/2020     (H) 02/18/2020    FRED 8.2 (L) 02/18/2020    PHOS 3.6 02/18/2020    MAG 1.9 02/18/2020    ALBUMIN 2.0 (L) 02/18/2020    PROTTOTAL 5.1 (L) 02/18/2020    ALT 64 (H) 02/18/2020    AST 57 (H) 02/18/2020    ALKPHOS 250 (H) 02/18/2020    BILITOTAL 0.2 02/18/2020    LIPASE 529 (H) 04/29/2007    HCG Negative 04/29/2007       Preop Vitals  BP Readings from Last 3 Encounters:   02/18/20 (!) 140/66   02/20/12 130/90   02/06/12 110/60    Pulse Readings from Last 3 Encounters:   02/17/20 81   02/20/12 76      Resp Readings from Last 3 Encounters:   02/18/20 17   02/20/12 12    SpO2 Readings from Last 3 Encounters:   02/18/20 94%      Temp Readings from Last 1 Encounters:   02/18/20 37.1  C (98.7  F) (Oral)    Ht Readings from Last 1 Encounters:   02/10/20 1.727 m (5' 8\")      Wt Readings from Last 1 Encounters:   02/17/20 85.3 kg (188 lb 1.6 oz)    Estimated body mass index is 28.6 kg/m  as calculated from the following:    Height as of this encounter: 1.727 m (5' 8\").    Weight as of this encounter: 85.3 kg (188 lb 1.6 oz).       Anesthesia Plan      History & Physical Review  History and physical reviewed and following examination; no interval change.    ASA Status:  3 .    NPO Status:  > 8 hours    Plan for General, ETT and RSI with Intravenous induction. " Maintenance will be Balanced.    PONV prophylaxis:  Ondansetron (or other 5HT-3) and Dexamethasone or Solumedrol  Additional equipment: 2nd IV      Postoperative Care  Postoperative pain management:  IV analgesics.      Consents  Anesthetic plan, risks, benefits and alternatives discussed with:  Patient.  Use of blood products discussed: Yes.   Use of blood products discussed with Patient.  Consented to blood products.  .                 Randolph Turk MD

## 2020-02-19 NOTE — ANESTHESIA CARE TRANSFER NOTE
Patient: Charlene Dia    Procedure(s):  EXPLORATOMY LAPAROTOMY AND LYSIS OF ADHESIONS    Diagnosis: Bowel obstruction (H) [K56.609]  Diagnosis Additional Information: No value filed.    Anesthesia Type:   General, ETT, RSI     Note:  Airway :Face Mask  Patient transferred to:PACU  Comments: Neuromuscular blockade reversed after TOF 4/4, spontaneous respirations, adequate tidal volumes, followed commands to voice, extubated atraumatically, extubated with suction, airway patent after extubation.  Oxygen via facemask at 6 liters per minute to PACU. Oxygen tubing connected to wall O2 in PACU, SpO2, NiBP, and EKG monitors and alarms on and functioning, Luisa Hugger warmer connected to patient gown, report on patient's clinical status given to PACU RN.     /79  HR 68  RR 20  O2 100%  Temp 36.9    Handoff Report: Identifed the Patient, Identified the Reponsible Provider, Reviewed the pertinent medical history, Discussed the surgical course, Reviewed Intra-OP anesthesia mangement and issues during anesthesia, Set expectations for post-procedure period and Allowed opportunity for questions and acknowledgement of understanding      Vitals: (Last set prior to Anesthesia Care Transfer)    CRNA VITALS  2/18/2020 2006 - 2/18/2020 2044 2/18/2020             NIBP:  (!) 185/92    NIBP Mean:  115    Resp Rate (set):  10                Electronically Signed By: Christine Marie Volp Hodgkins, CRNA, APRN CRNA  February 18, 2020  8:44 PM

## 2020-02-19 NOTE — PLAN OF CARE
A/Ox4, slight cognitive delay. VSS on room air. Some pain in abdomen - given IV dilaudid x1. IV zofran for nausea. PICC line placed today - TPN to start tonight. NPO since 0800 - sent for surgery at 1845. Report given to preop. Brother at bedside this evening. Will continue to monitor.

## 2020-02-19 NOTE — PLAN OF CARE
PT- Attempted to see pt this AM. Pt c/o increase in pain and declined any OOB mobility. Pt stated she might be willing to move with therapy later today if her pain medication kicks in. Nurse present and aware.

## 2020-02-19 NOTE — PLAN OF CARE
Arrived back on floor from PACU @ 2200. A&O. VSS, slightly hypoxic upon arriving to floor, weaned down to 1L overnight, satting low 90's. tmax 99.0. Capno, WDL. Started having severe pain in bilateral sides/abdomen around 0500, given IV dilaudid for breakthrough. Midline incision covered with island dressing, mild drainage- reinforced w/ ABD pad and covered with abdominal binder, drainage unchanged overnight. Ice for comfort. BS hypo, - flatus. NPO ice/meds. NG to LIS, small green/brown output. Denies nausea. PICC in L arm, triple lumen with blood return x3. TPN started at 0130 d/t needing xray verification of PICC placement, infusing @ 50ml, to be increased to 80ml 2/20 @ 0130. x2 PIV, IVF infusing. Discharge pending.

## 2020-02-19 NOTE — PROGRESS NOTES
Post Op Noted    Pre-op Diagnosis:  SBO, left ovarian cancer  Post-op diagnosis:  Same  Surgeon:   LETTY Bryson MD  Anesthesis:  GET  Procedure:  X-lap, LEILA  Findings:  Loop of ileum stuck to presacral peritoneum with resultant SBO  EBL:  5 mL  Complications:  None    LETTY Bryson MD

## 2020-02-20 LAB
ANION GAP SERPL CALCULATED.3IONS-SCNC: 5 MMOL/L (ref 3–14)
BUN SERPL-MCNC: 15 MG/DL (ref 7–30)
CALCIUM SERPL-MCNC: 8.4 MG/DL (ref 8.5–10.1)
CHLORIDE SERPL-SCNC: 103 MMOL/L (ref 94–109)
CO2 SERPL-SCNC: 26 MMOL/L (ref 20–32)
CREAT SERPL-MCNC: 0.64 MG/DL (ref 0.52–1.04)
GFR SERPL CREATININE-BSD FRML MDRD: >90 ML/MIN/{1.73_M2}
GLUCOSE BLDC GLUCOMTR-MCNC: 139 MG/DL (ref 70–99)
GLUCOSE BLDC GLUCOMTR-MCNC: 149 MG/DL (ref 70–99)
GLUCOSE BLDC GLUCOMTR-MCNC: 151 MG/DL (ref 70–99)
GLUCOSE SERPL-MCNC: 135 MG/DL (ref 70–99)
MAGNESIUM SERPL-MCNC: 2.1 MG/DL (ref 1.6–2.3)
PHOSPHATE SERPL-MCNC: 2.9 MG/DL (ref 2.5–4.5)
POTASSIUM SERPL-SCNC: 3.9 MMOL/L (ref 3.4–5.3)
SODIUM SERPL-SCNC: 134 MMOL/L (ref 133–144)

## 2020-02-20 PROCEDURE — 25800030 ZZH RX IP 258 OP 636: Performed by: OBSTETRICS & GYNECOLOGY

## 2020-02-20 PROCEDURE — 83735 ASSAY OF MAGNESIUM: CPT | Performed by: OBSTETRICS & GYNECOLOGY

## 2020-02-20 PROCEDURE — 25000128 H RX IP 250 OP 636: Performed by: OBSTETRICS & GYNECOLOGY

## 2020-02-20 PROCEDURE — 25000132 ZZH RX MED GY IP 250 OP 250 PS 637: Mod: GY | Performed by: NURSE PRACTITIONER

## 2020-02-20 PROCEDURE — 12000000 ZZH R&B MED SURG/OB

## 2020-02-20 PROCEDURE — 40000239 ZZH STATISTIC VAT ROUNDS

## 2020-02-20 PROCEDURE — 25000132 ZZH RX MED GY IP 250 OP 250 PS 637: Mod: GY | Performed by: OBSTETRICS & GYNECOLOGY

## 2020-02-20 PROCEDURE — 25000125 ZZHC RX 250: Performed by: OBSTETRICS & GYNECOLOGY

## 2020-02-20 PROCEDURE — 80048 BASIC METABOLIC PNL TOTAL CA: CPT | Performed by: OBSTETRICS & GYNECOLOGY

## 2020-02-20 PROCEDURE — 84100 ASSAY OF PHOSPHORUS: CPT | Performed by: OBSTETRICS & GYNECOLOGY

## 2020-02-20 PROCEDURE — 00000146 ZZHCL STATISTIC GLUCOSE BY METER IP

## 2020-02-20 PROCEDURE — 25000128 H RX IP 250 OP 636: Performed by: NURSE PRACTITIONER

## 2020-02-20 RX ORDER — LIDOCAINE 4 G/G
1 PATCH TOPICAL
Status: DISCONTINUED | OUTPATIENT
Start: 2020-02-20 | End: 2020-02-27 | Stop reason: HOSPADM

## 2020-02-20 RX ADMIN — KETOROLAC TROMETHAMINE 30 MG: 30 INJECTION, SOLUTION INTRAMUSCULAR at 15:47

## 2020-02-20 RX ADMIN — OMEPRAZOLE 20 MG: 20 CAPSULE, DELAYED RELEASE ORAL at 15:47

## 2020-02-20 RX ADMIN — ASCORBIC ACID, VITAMIN A PALMITATE, CHOLECALCIFEROL, THIAMINE HYDROCHLORIDE, RIBOFLAVIN-5 PHOSPHATE SODIUM, PYRIDOXINE HYDROCHLORIDE, NIACINAMIDE, DEXPANTHENOL, ALPHA-TOCOPHEROL ACETATE, VITAMIN K1, FOLIC ACID, BIOTIN, CYANOCOBALAMIN: 200; 3300; 200; 6; 3.6; 6; 40; 15; 10; 150; 600; 60; 5 INJECTION, SOLUTION INTRAVENOUS at 09:00

## 2020-02-20 RX ADMIN — KETOROLAC TROMETHAMINE 30 MG: 30 INJECTION, SOLUTION INTRAMUSCULAR at 22:18

## 2020-02-20 RX ADMIN — ENOXAPARIN SODIUM 40 MG: 40 INJECTION SUBCUTANEOUS at 08:49

## 2020-02-20 RX ADMIN — INSULIN ASPART 1 UNITS: 100 INJECTION, SOLUTION INTRAVENOUS; SUBCUTANEOUS at 22:56

## 2020-02-20 RX ADMIN — KETOROLAC TROMETHAMINE 30 MG: 30 INJECTION, SOLUTION INTRAMUSCULAR at 06:36

## 2020-02-20 RX ADMIN — Medication 1 LOZENGE: at 14:39

## 2020-02-20 RX ADMIN — CARBAMAZEPINE 400 MG: 200 CAPSULE, EXTENDED RELEASE ORAL at 20:22

## 2020-02-20 RX ADMIN — LIDOCAINE 1 PATCH: 560 PATCH PERCUTANEOUS; TOPICAL; TRANSDERMAL at 15:47

## 2020-02-20 RX ADMIN — ATORVASTATIN CALCIUM 80 MG: 80 TABLET, FILM COATED ORAL at 20:22

## 2020-02-20 RX ADMIN — HYDROCODONE BITARTRATE AND ACETAMINOPHEN 2 TABLET: 5; 325 TABLET ORAL at 08:57

## 2020-02-20 RX ADMIN — HYDROMORPHONE HYDROCHLORIDE 0.3 MG: 1 INJECTION, SOLUTION INTRAMUSCULAR; INTRAVENOUS; SUBCUTANEOUS at 04:38

## 2020-02-20 RX ADMIN — CARBAMAZEPINE 400 MG: 200 CAPSULE, EXTENDED RELEASE ORAL at 08:52

## 2020-02-20 RX ADMIN — INSULIN ASPART 1 UNITS: 100 INJECTION, SOLUTION INTRAVENOUS; SUBCUTANEOUS at 02:33

## 2020-02-20 RX ADMIN — POTASSIUM CHLORIDE, DEXTROSE MONOHYDRATE AND SODIUM CHLORIDE: 150; 5; 450 INJECTION, SOLUTION INTRAVENOUS at 14:30

## 2020-02-20 RX ADMIN — INSULIN ASPART 1 UNITS: 100 INJECTION, SOLUTION INTRAVENOUS; SUBCUTANEOUS at 06:43

## 2020-02-20 RX ADMIN — HYDROMORPHONE HYDROCHLORIDE 0.3 MG: 1 INJECTION, SOLUTION INTRAMUSCULAR; INTRAVENOUS; SUBCUTANEOUS at 11:58

## 2020-02-20 ASSESSMENT — ACTIVITIES OF DAILY LIVING (ADL)
ADLS_ACUITY_SCORE: 13

## 2020-02-20 ASSESSMENT — MIFFLIN-ST. JEOR: SCORE: 1409.56

## 2020-02-20 NOTE — PROGRESS NOTES
"SPIRITUAL [facility] SERVICES Progress Note  [facility]    Brief visit with pt for follow-up.  Pt was lying in bed, and, when I inquired, said, \"It's not a good day.\"  She seemed rather miserable, and said she was having lots of pain today.  She wasn't interested in a visit at this time, but agreed to my offer to follow up again tomorrow.   team also available per request.                                                                                                                                                 Sierra Pablo M.A.  Staff   Pager [phone]  Phone [phone]      "

## 2020-02-20 NOTE — PLAN OF CARE
Pt is A&Ox4. VSS. C/o of severe pain in back and abdomen, controlled w/ dilaudid x2, and Toradol x3- slightly effective but still in pain. Up A1 GB walker. NPO ex ice chips. NG to LIS w/ green OPAngie Perez in place. Repos as pt tolerates. BS hypo. PICC infusing TPN and IVF. BS checks q4hrs. PIV x2. Gyn/onc following. Discharge pending clinical improvement.

## 2020-02-20 NOTE — PROGRESS NOTES
"CLINICAL NUTRITION SERVICES - REASSESSMENT NOTE      Malnutrition ():   % Weight Loss:  None noted  % Intake:  </= 50% for >/= 5 days (severe malnutrition)  Subcutaneous Fat Loss:  None observed  Muscle Loss:  None observed  Fluid Retention:  \"fluid overload\" (likely not nutrition related)     Malnutrition Diagnosis: Patient does not meet two of the above criteria necessary for diagnosing malnutrition      EVALUATION OF PROGRESS TOWARD GOALS   Diet:  NPO    Nutrition Support:  TPN running @ 80 ml/hr.     Nutrition Support Parenteral:  Type of Access: Central  Parenteral Frequency:  Continuous  Parenteral Regimen: Clinimix D15 AA5  @ 80 ml/hr + 250 ml of 20% IV lipids daily   Total Parenteral Provisions: 288g Dex daily (979kcal), 96g AA daily (384kcal) and 250 ml of 20% IV lipids daily = 500 kcals/day, 1.4 g PRO/kg/day, GIR 2.98 with 27% kcals from Fat.  Total kcal: 1863 (28 kcal/kg)    Intake/Tolerance:    Last BM on   I/O 969/3500  Wt 79.6 kg - slightly up since admission     K+ 3.9, Mg++ 2.1, Phos 2.9 (WNL)  B-155 on HSSI    D5% @ 20 ml/hr  NG output on  = 1100 ml    ASSESSED NUTRITION NEEDS:  Dosing Weight::  67 kg (adjusted wt for overwt)  Energy Needs: 7124-5964 kcals (25-30 Kcal/Kg) - overweight  Protein Needs:  grams protein (1.2-1.5 g pro/Kg) - post-op    NEW FINDINGS:   : Exploratory laparotomy + enterolysis --> The patient was found to have a dilated proximal and mid small bowel.  There was a transition point in the pelvis where the bowel was stuck to the presacral peritoneum.     : TPN started @ 0130 via PICC line    Previous Goals:   Diet to advance in the next 48-72 hrs  Evaluation: Not met    Previous Nutrition Diagnosis:   Inadequate protein-energy intake related to dietary restrictions as evidenced by pt has been NPO/liquid diet for the past 7 days  Evaluation: Improving    CURRENT NUTRITION DIAGNOSIS  Altered GI function related to small bowel obstruction as evidenced " by NPO status x2 days and reliance on TPN to meet assessed needs.     INTERVENTIONS  Recommendations / Nutrition Prescription  Continue with current TPN regimen.    Implementation  Collaboration and Referral of Nutrition care - spoke with Pharm regarding no changes to TPN order.     Goals  TPN + Lipids will meet % of assessed needs.       MONITORING AND EVALUATION:  Progress towards goals will be monitored and evaluated per protocol and Practice Guidelines    Sarah Summers  Dietetic Intern

## 2020-02-20 NOTE — PLAN OF CARE
A/Ox4. VSS on room air. Pain in abdomen - given IV dialudid x1 and IV toradol x2 today. Up assist 1/GB/walker - up to chair x2 and ambulated in room. NPO - ice chips ok. NG tube to LIS, XR verified placement - Green/brown output. Perez catheter in place, good urine output. Bowel sounds hypoactive. PICC line with TPN and fluids. Will continue to monitor.

## 2020-02-20 NOTE — PROGRESS NOTES
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 POD 8 X-lap, radical resection of left adnexal tumor with TAHBSO, left ureterolysis, omentectomy, left pelvic and para-aortic lymphadenectomy. Will need chemotherapy in adjuvant setting once recovered from surgery.  Will need port placement.  Pt's family has declined in patient placement, prefers to wait.   Has post op tx planning visit already scheduled for 3/6/20 at 4:00 in [facility] office of Dr. Bryson.      Postoperative SBO:  Having gas pain. Continue NPO with NGT to LIS.  PICC line infusing TPN.     Post operative pain:  On oral hydrocodone and IV Dilaudid.  C/O significant pain with movement.  Add Toradol and Tylenol. Aqua K pad, recliner, lidocaine patch. Needs to ambulate more.      Leukocytosis: Resolved.      SOO/Oliguria: resolved..      Fluid overload: Improved. Monitor.      DVT Prophylaxis:  Prophylactic Lovenox. Rx lovenox on discharge x 28 days. Will need teaching prior to discharge.      Epilepsy:  Continue Carbamazepine     Hyperlipidemia:  On Atorvastatin     HTN: Losartan on hold     Disposition:  OT/PT/SW following. Patient ok with TCU.  to arrange for placement upon resolution of SBO.    Discussed care with RN.                        Medications:   I have reviewed this patient's current medications               Physical Exam:   Blood pressure (!) 146/65, pulse 85, temperature 97.9  F (36.6  C), temperature source Oral, resp. rate 16, height 1.727 m (5' 8\"), weight 79.6 kg (175 lb 8 oz), SpO2 93 %.        Vital Sign Ranges  Temperature Temp  Av.7  F (36.5  C)  Min: 97  F (36.1  C)  Max: 98.3  F (36.8  C)   Blood pressure Systolic (24hrs), Av , Min:138 , Max:171        Diastolic (24hrs), Av, Min:68, Max:86      Pulse Pulse  Av  Min: 81  Max: 81   Respirations Resp  Av.8 "  Min: 16  Max: 17   Pulse oximetry SpO2  Av.3 %  Min: 94 %  Max: 97 %         Intake/Output Summary (Last 24 hours) at 2020 1158  Last data filed at 2020 0600  Gross per 24 hour   Intake --   Output 1700 ml   Net -1700 ml       Lungs:   Clear       Cardiovascular:   normal apical pulses      Abdomen:   NT, minimally distended, tympanetic                Data:   All laboratory data reviewed

## 2020-02-20 NOTE — PLAN OF CARE
"PT: attempted session. Pt agreeable to try OOB mobility. Upon sitting, pt vocal with pain and stated, \"I can't\" and returned to side lying. Discussed trying again later with nursing; pt agreeable.   "

## 2020-02-20 NOTE — PROGRESS NOTES
SW:  D: Reviewed chart and spoke with patient regarding discharge plans. Patient has TPN, so TCU locations were discussed who could manage patient's TPN. Explained [facility], [facility] at [place], and [facility] at [place] as appropriate TPN TCU locations. Patient agreed to this plan. Referrals sent to [facility]ILLAGOMEZ at [place], and [facility] at [facility] via DOD. Referrals sent via DOD to TCU locations listed above.     P: Will continue to follow.    RILEY Reich     [facility]

## 2020-02-21 ENCOUNTER — APPOINTMENT (OUTPATIENT)
Dept: PHYSICAL THERAPY | Facility: CLINIC | Age: 60
DRG: 737 | End: 2020-02-21
Attending: OBSTETRICS & GYNECOLOGY
Payer: MEDICARE

## 2020-02-21 LAB
ANION GAP SERPL CALCULATED.3IONS-SCNC: 4 MMOL/L (ref 3–14)
BUN SERPL-MCNC: 18 MG/DL (ref 7–30)
CALCIUM SERPL-MCNC: 8.3 MG/DL (ref 8.5–10.1)
CHLORIDE SERPL-SCNC: 102 MMOL/L (ref 94–109)
CO2 SERPL-SCNC: 27 MMOL/L (ref 20–32)
CREAT SERPL-MCNC: 0.61 MG/DL (ref 0.52–1.04)
GFR SERPL CREATININE-BSD FRML MDRD: >90 ML/MIN/{1.73_M2}
GLUCOSE BLDC GLUCOMTR-MCNC: 120 MG/DL (ref 70–99)
GLUCOSE BLDC GLUCOMTR-MCNC: 127 MG/DL (ref 70–99)
GLUCOSE BLDC GLUCOMTR-MCNC: 134 MG/DL (ref 70–99)
GLUCOSE BLDC GLUCOMTR-MCNC: 136 MG/DL (ref 70–99)
GLUCOSE BLDC GLUCOMTR-MCNC: 136 MG/DL (ref 70–99)
GLUCOSE BLDC GLUCOMTR-MCNC: 144 MG/DL (ref 70–99)
GLUCOSE SERPL-MCNC: 126 MG/DL (ref 70–99)
MAGNESIUM SERPL-MCNC: 2.1 MG/DL (ref 1.6–2.3)
PHOSPHATE SERPL-MCNC: 3.6 MG/DL (ref 2.5–4.5)
POTASSIUM SERPL-SCNC: 3.7 MMOL/L (ref 3.4–5.3)
SODIUM SERPL-SCNC: 133 MMOL/L (ref 133–144)

## 2020-02-21 PROCEDURE — 40000239 ZZH STATISTIC VAT ROUNDS

## 2020-02-21 PROCEDURE — 00000146 ZZHCL STATISTIC GLUCOSE BY METER IP

## 2020-02-21 PROCEDURE — 25000125 ZZHC RX 250: Performed by: OBSTETRICS & GYNECOLOGY

## 2020-02-21 PROCEDURE — 25000128 H RX IP 250 OP 636: Performed by: OBSTETRICS & GYNECOLOGY

## 2020-02-21 PROCEDURE — 25800030 ZZH RX IP 258 OP 636: Performed by: OBSTETRICS & GYNECOLOGY

## 2020-02-21 PROCEDURE — 97116 GAIT TRAINING THERAPY: CPT | Mod: GP

## 2020-02-21 PROCEDURE — 25000128 H RX IP 250 OP 636: Performed by: NURSE PRACTITIONER

## 2020-02-21 PROCEDURE — 84100 ASSAY OF PHOSPHORUS: CPT | Performed by: OBSTETRICS & GYNECOLOGY

## 2020-02-21 PROCEDURE — 25000132 ZZH RX MED GY IP 250 OP 250 PS 637: Mod: GY | Performed by: OBSTETRICS & GYNECOLOGY

## 2020-02-21 PROCEDURE — 25000132 ZZH RX MED GY IP 250 OP 250 PS 637: Mod: GY | Performed by: NURSE PRACTITIONER

## 2020-02-21 PROCEDURE — 80048 BASIC METABOLIC PNL TOTAL CA: CPT | Performed by: OBSTETRICS & GYNECOLOGY

## 2020-02-21 PROCEDURE — 83735 ASSAY OF MAGNESIUM: CPT | Performed by: OBSTETRICS & GYNECOLOGY

## 2020-02-21 PROCEDURE — 12000000 ZZH R&B MED SURG/OB

## 2020-02-21 PROCEDURE — 97530 THERAPEUTIC ACTIVITIES: CPT | Mod: GP

## 2020-02-21 RX ADMIN — KETOROLAC TROMETHAMINE 30 MG: 30 INJECTION, SOLUTION INTRAMUSCULAR at 12:25

## 2020-02-21 RX ADMIN — KETOROLAC TROMETHAMINE 30 MG: 30 INJECTION, SOLUTION INTRAMUSCULAR at 04:29

## 2020-02-21 RX ADMIN — OMEPRAZOLE 20 MG: 20 CAPSULE, DELAYED RELEASE ORAL at 18:41

## 2020-02-21 RX ADMIN — ATORVASTATIN CALCIUM 80 MG: 80 TABLET, FILM COATED ORAL at 21:44

## 2020-02-21 RX ADMIN — CARBAMAZEPINE 400 MG: 200 CAPSULE, EXTENDED RELEASE ORAL at 21:44

## 2020-02-21 RX ADMIN — ENOXAPARIN SODIUM 40 MG: 40 INJECTION SUBCUTANEOUS at 09:41

## 2020-02-21 RX ADMIN — KETOROLAC TROMETHAMINE 30 MG: 30 INJECTION, SOLUTION INTRAMUSCULAR at 18:41

## 2020-02-21 RX ADMIN — LIDOCAINE 1 PATCH: 560 PATCH PERCUTANEOUS; TOPICAL; TRANSDERMAL at 18:40

## 2020-02-21 RX ADMIN — Medication 1 LOZENGE: at 08:41

## 2020-02-21 RX ADMIN — HYDROCODONE BITARTRATE AND ACETAMINOPHEN 2 TABLET: 5; 325 TABLET ORAL at 18:41

## 2020-02-21 RX ADMIN — ONDANSETRON 4 MG: 2 INJECTION INTRAMUSCULAR; INTRAVENOUS at 22:05

## 2020-02-21 RX ADMIN — OMEPRAZOLE 20 MG: 20 CAPSULE, DELAYED RELEASE ORAL at 09:41

## 2020-02-21 RX ADMIN — CARBAMAZEPINE 400 MG: 200 CAPSULE, EXTENDED RELEASE ORAL at 09:41

## 2020-02-21 RX ADMIN — Medication 1 LOZENGE: at 15:00

## 2020-02-21 RX ADMIN — I.V. FAT EMULSION 250 ML: 20 EMULSION INTRAVENOUS at 19:49

## 2020-02-21 RX ADMIN — HYDROCODONE BITARTRATE AND ACETAMINOPHEN 2 TABLET: 5; 325 TABLET ORAL at 09:41

## 2020-02-21 RX ADMIN — POTASSIUM CHLORIDE, DEXTROSE MONOHYDRATE AND SODIUM CHLORIDE: 150; 5; 450 INJECTION, SOLUTION INTRAVENOUS at 12:26

## 2020-02-21 RX ADMIN — Medication 1 LOZENGE: at 18:41

## 2020-02-21 ASSESSMENT — ACTIVITIES OF DAILY LIVING (ADL)
ADLS_ACUITY_SCORE: 13

## 2020-02-21 NOTE — PROGRESS NOTES
LIZABETH  I: SW re sent referrals now that patient is not likely to discharge with TPN. LIZABETH awaits an update after facilities assess patient. Patient likely to discharge on Monday.    P: SW will continue to follow and assist as needed.    TIKI Wagoner, LGSW  [phone]  [facility]

## 2020-02-21 NOTE — PLAN OF CARE
Shift Note:VSS, afebrile, c/o persistent pain in abd and (chronic)back pain. Up wSBA, verde removed and voided this PM. Needs encouragement to move more to improve pain control and bowel function. A/O, calls for needs, tolerated NG clamping for meds. Plan is dishcarge to TCU when bed available.

## 2020-02-21 NOTE — PROGRESS NOTES
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  POD11 X-lap, radical resection of left adnexal tumor with TAHBSO, left ureterolysis, omentectomy, left pelvic and para-aortic lymphadenectomy. Will need chemotherapy in adjuvant setting once recovered from surgery.  Will need port placement.  Pt's family has declined in patient placement, prefers to wait.   Has post op tx planning visit already scheduled for 3/6/20 at 4:00 in [facility] office of Dr. Bryson.      Postoperative SBO:  Passing flatus and has good BS.  Will do NGT clamping trial.  If residuals < 50 mL in 4 hours, can remove NGT and start clear liquids.  PICC line infusing TPN. If tolerating clears, can advance diet over weekend and discontinue TPN (I do not expect patient to be going to TCU with TPN)     Post operative pain:  On oral hydrocodone and IV Dilaudid.  C/O significant pain with movement.  Add Toradol and Tylenol. Aqua K pad, recliner, lidocaine patch. In better spirits and pain better     Leukocytosis: Resolved.      SOO/Oliguria: resolved..      Fluid overload: Improved. Monitor.      DVT Prophylaxis:  Prophylactic Lovenox. Rx lovenox on discharge x 15 days. Will need teaching prior to discharge.     Disposition:  Likely to TCU Monday if bed available.               Interval History:   Back pain better.  Up to bathroom.  Passed flatus              Medications:   I have reviewed this patient's current medications               Physical Exam:   Blood pressure 139/70, pulse 90, temperature 98.7  F (37.1  C), temperature source Oral, resp. rate 16, height 1.727 m (5' 8\"), weight 79.6 kg (175 lb 8 oz), SpO2 94 %.        Vital Sign Ranges  Temperature Temp  Av.5  F (36.9  C)  Min: 97.9  F (36.6  C)  Max: 98.9  F (37.2  C)   Blood pressure Systolic (24hrs), Av , Min:131 , Max:146        " Diastolic (24hrs), Av, Min:57, Max:73      Pulse Pulse  Av  Min: 90  Max: 90   Respirations Resp  Av.3  Min: 16  Max: 18   Pulse oximetry SpO2  Av.7 %  Min: 93 %  Max: 95 %         Intake/Output Summary (Last 24 hours) at 2020 0826  Last data filed at 2020 0600  Gross per 24 hour   Intake 5280 ml   Output 1850 ml   Net 3430 ml       Lungs:   Clear to auscultation but diminished at bases posteriorly     Cardiovascular:   normal apical pulses      Abdomen:   Active BS, slight distension, minimal tympany     Musculoskeletal:   no lower extremity pitting edema present                Data:   All laboratory data reviewed

## 2020-02-21 NOTE — PLAN OF CARE
Discharge Planner PT   Patient plan for discharge: none stated  Current status: Pt in bed upon arrival; agreeable to therapy. Reviewed log roll technique. Performed supine <> sit with bed slightly elevated, cues and Min A. STS transfer to FWW completed with CGA and cues for hand placement. Ambulated 500+ ft x 1 with FWW and CGA progressing to SBA; step through reciprocal pattern. Cues needed for walker proximity. Toilet transfer completed with close SBA. Returned to bed at end of session. Positioned with pillows for pressure relief and pt comfort.   Barriers to return to prior living situation: Lives alone, impaired balance & fall risk, pain  Recommendations for discharge: TCU per plan established by the PT.  Rationale for recommendations: Patient lives alone and does not have assist available. Per pt report, she is fluctuating between 1-2 person assist for mobility. Patient would benefit from skilled PT intervention at TCU to maximize independence with functional mobility prior to return home.       Entered by: Lou Borja 02/21/2020 2:01 PM

## 2020-02-21 NOTE — PROGRESS NOTES
SW:   D: Patient was denied from [facility].     P: Will continue to follow.     RILEY Reich     [facility]

## 2020-02-21 NOTE — PLAN OF CARE
A&Ox4. VSS on room air. C/o 5/10 abdominal pain, managed well with PRN Toradol. Lidocaine patch removed at 0400 from lower back. Aqua K pad applied to upper back. Abdominal incision KAMLA, WDL. Abdominal binder in place. NG to LIS, brown/green output. Did not tolerate clamping NG to medications - x1 small emesis 20min after clamping/medication administration. Hypoactive BS. NPO+ice chips. Q4h BG checks/sliding scale insulin. Assist of 1+gb+w, voiding adequately in bathroom. PICC inf TPN @ 80mL/hr and IVF.

## 2020-02-21 NOTE — PLAN OF CARE
PT: attempted session. Pt reports fatigue after using BR and getting cleaned up. Requests PT to check back

## 2020-02-22 ENCOUNTER — APPOINTMENT (OUTPATIENT)
Dept: PHYSICAL THERAPY | Facility: CLINIC | Age: 60
DRG: 737 | End: 2020-02-22
Attending: OBSTETRICS & GYNECOLOGY
Payer: MEDICARE

## 2020-02-22 LAB
ANION GAP SERPL CALCULATED.3IONS-SCNC: 6 MMOL/L (ref 3–14)
BUN SERPL-MCNC: 23 MG/DL (ref 7–30)
CALCIUM SERPL-MCNC: 8.6 MG/DL (ref 8.5–10.1)
CHLORIDE SERPL-SCNC: 102 MMOL/L (ref 94–109)
CO2 SERPL-SCNC: 27 MMOL/L (ref 20–32)
CREAT SERPL-MCNC: 0.64 MG/DL (ref 0.52–1.04)
GFR SERPL CREATININE-BSD FRML MDRD: >90 ML/MIN/{1.73_M2}
GLUCOSE BLDC GLUCOMTR-MCNC: 123 MG/DL (ref 70–99)
GLUCOSE BLDC GLUCOMTR-MCNC: 132 MG/DL (ref 70–99)
GLUCOSE BLDC GLUCOMTR-MCNC: 133 MG/DL (ref 70–99)
GLUCOSE BLDC GLUCOMTR-MCNC: 138 MG/DL (ref 70–99)
GLUCOSE BLDC GLUCOMTR-MCNC: 146 MG/DL (ref 70–99)
GLUCOSE BLDC GLUCOMTR-MCNC: 146 MG/DL (ref 70–99)
GLUCOSE SERPL-MCNC: 124 MG/DL (ref 70–99)
MAGNESIUM SERPL-MCNC: 2.1 MG/DL (ref 1.6–2.3)
PHOSPHATE SERPL-MCNC: 3.9 MG/DL (ref 2.5–4.5)
PLATELET # BLD AUTO: 633 10E9/L (ref 150–450)
POTASSIUM SERPL-SCNC: 4 MMOL/L (ref 3.4–5.3)
SODIUM SERPL-SCNC: 135 MMOL/L (ref 133–144)

## 2020-02-22 PROCEDURE — 00000146 ZZHCL STATISTIC GLUCOSE BY METER IP

## 2020-02-22 PROCEDURE — 40000239 ZZH STATISTIC VAT ROUNDS

## 2020-02-22 PROCEDURE — 25000132 ZZH RX MED GY IP 250 OP 250 PS 637: Mod: GY | Performed by: OBSTETRICS & GYNECOLOGY

## 2020-02-22 PROCEDURE — 97116 GAIT TRAINING THERAPY: CPT | Mod: GP

## 2020-02-22 PROCEDURE — 25800030 ZZH RX IP 258 OP 636: Performed by: OBSTETRICS & GYNECOLOGY

## 2020-02-22 PROCEDURE — 12000000 ZZH R&B MED SURG/OB

## 2020-02-22 PROCEDURE — 84100 ASSAY OF PHOSPHORUS: CPT | Performed by: OBSTETRICS & GYNECOLOGY

## 2020-02-22 PROCEDURE — 83735 ASSAY OF MAGNESIUM: CPT | Performed by: OBSTETRICS & GYNECOLOGY

## 2020-02-22 PROCEDURE — 85049 AUTOMATED PLATELET COUNT: CPT | Performed by: OBSTETRICS & GYNECOLOGY

## 2020-02-22 PROCEDURE — 25000128 H RX IP 250 OP 636: Performed by: OBSTETRICS & GYNECOLOGY

## 2020-02-22 PROCEDURE — 80048 BASIC METABOLIC PNL TOTAL CA: CPT | Performed by: OBSTETRICS & GYNECOLOGY

## 2020-02-22 PROCEDURE — 25000125 ZZHC RX 250: Performed by: OBSTETRICS & GYNECOLOGY

## 2020-02-22 PROCEDURE — 25000132 ZZH RX MED GY IP 250 OP 250 PS 637: Mod: GY | Performed by: NURSE PRACTITIONER

## 2020-02-22 RX ORDER — POLYETHYLENE GLYCOL 3350 17 G/17G
17 POWDER, FOR SOLUTION ORAL DAILY
Status: DISCONTINUED | OUTPATIENT
Start: 2020-02-22 | End: 2020-02-27 | Stop reason: HOSPADM

## 2020-02-22 RX ORDER — CARBAMAZEPINE 100 MG/1
400 CAPSULE, EXTENDED RELEASE ORAL 2 TIMES DAILY
Status: DISCONTINUED | OUTPATIENT
Start: 2020-02-22 | End: 2020-02-27 | Stop reason: HOSPADM

## 2020-02-22 RX ORDER — HYDROMORPHONE HYDROCHLORIDE 5 MG/5ML
2 SOLUTION ORAL EVERY 4 HOURS PRN
Status: DISCONTINUED | OUTPATIENT
Start: 2020-02-22 | End: 2020-02-27 | Stop reason: HOSPADM

## 2020-02-22 RX ORDER — IBUPROFEN 100 MG/5ML
400 SUSPENSION, ORAL (FINAL DOSE FORM) ORAL EVERY 4 HOURS PRN
Status: DISCONTINUED | OUTPATIENT
Start: 2020-02-22 | End: 2020-02-27 | Stop reason: HOSPADM

## 2020-02-22 RX ORDER — LORAZEPAM 2 MG/ML
0.5 INJECTION INTRAMUSCULAR EVERY 6 HOURS PRN
Status: DISCONTINUED | OUTPATIENT
Start: 2020-02-22 | End: 2020-02-27 | Stop reason: HOSPADM

## 2020-02-22 RX ADMIN — POTASSIUM CHLORIDE, DEXTROSE MONOHYDRATE AND SODIUM CHLORIDE: 150; 5; 450 INJECTION, SOLUTION INTRAVENOUS at 10:38

## 2020-02-22 RX ADMIN — KETOROLAC TROMETHAMINE 30 MG: 30 INJECTION, SOLUTION INTRAMUSCULAR at 21:13

## 2020-02-22 RX ADMIN — ASCORBIC ACID, VITAMIN A PALMITATE, CHOLECALCIFEROL, THIAMINE HYDROCHLORIDE, RIBOFLAVIN-5 PHOSPHATE SODIUM, PYRIDOXINE HYDROCHLORIDE, NIACINAMIDE, DEXPANTHENOL, ALPHA-TOCOPHEROL ACETATE, VITAMIN K1, FOLIC ACID, BIOTIN, CYANOCOBALAMIN: 200; 3300; 200; 6; 3.6; 6; 40; 15; 10; 150; 600; 60; 5 INJECTION, SOLUTION INTRAVENOUS at 10:38

## 2020-02-22 RX ADMIN — Medication 1 LOZENGE: at 08:18

## 2020-02-22 RX ADMIN — ATORVASTATIN CALCIUM 80 MG: 80 TABLET, FILM COATED ORAL at 21:13

## 2020-02-22 RX ADMIN — ACETAMINOPHEN 650 MG: 325 TABLET, FILM COATED ORAL at 21:13

## 2020-02-22 RX ADMIN — KETOROLAC TROMETHAMINE 30 MG: 30 INJECTION, SOLUTION INTRAMUSCULAR at 01:14

## 2020-02-22 RX ADMIN — Medication 1 MG: at 21:13

## 2020-02-22 RX ADMIN — I.V. FAT EMULSION 250 ML: 20 EMULSION INTRAVENOUS at 21:30

## 2020-02-22 RX ADMIN — OMEPRAZOLE 20 MG: 20 CAPSULE, DELAYED RELEASE ORAL at 15:45

## 2020-02-22 RX ADMIN — CARBAMAZEPINE 400 MG: 200 CAPSULE, EXTENDED RELEASE ORAL at 08:17

## 2020-02-22 RX ADMIN — Medication 1 ML: at 10:01

## 2020-02-22 RX ADMIN — ENOXAPARIN SODIUM 40 MG: 40 INJECTION SUBCUTANEOUS at 08:23

## 2020-02-22 RX ADMIN — KETOROLAC TROMETHAMINE 30 MG: 30 INJECTION, SOLUTION INTRAMUSCULAR at 08:18

## 2020-02-22 RX ADMIN — POLYETHYLENE GLYCOL 3350 17 G: 17 POWDER, FOR SOLUTION ORAL at 10:01

## 2020-02-22 RX ADMIN — OMEPRAZOLE 20 MG: 20 CAPSULE, DELAYED RELEASE ORAL at 08:18

## 2020-02-22 RX ADMIN — CARBAMAZEPINE 400 MG: 100 CAPSULE, EXTENDED RELEASE ORAL at 21:13

## 2020-02-22 RX ADMIN — LIDOCAINE 1 PATCH: 560 PATCH PERCUTANEOUS; TOPICAL; TRANSDERMAL at 15:45

## 2020-02-22 ASSESSMENT — ACTIVITIES OF DAILY LIVING (ADL)
ADLS_ACUITY_SCORE: 13
ADLS_ACUITY_SCORE: 14
ADLS_ACUITY_SCORE: 14
ADLS_ACUITY_SCORE: 13

## 2020-02-22 ASSESSMENT — MIFFLIN-ST. JEOR: SCORE: 1392.33

## 2020-02-22 NOTE — PROGRESS NOTES
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  POD12 X-lap, radical resection of left adnexal tumor with TAHBSO, left ureterolysis, omentectomy, left pelvic and para-aortic lymphadenectomy. Will need chemotherapy in adjuvant setting once recovered from surgery.  Will need port placement.  Pt's family has declined in patient placement, prefers to wait.   Has post op tx planning visit already scheduled for 3/6/20 at 4:00 in [facility] office of Dr. Bryson.      Postoperative SBO:  Passing flatus and has good BS.  NGT removed 2/21 pm and has been drinking water fine.  PICC line infusing TPN. Encourage clears and will start Ensure Clear Boost supplement as well. Will advance to soft diet tomorrow if tolerates clears well today and then possibly discontinue TPN tomorrow if tolerating diet okay.     Dysphagia/Throat discomfort: Likely due to intubation x 2.  Lozenges and Cholarseptic spray prn and may chew gum.  Will attempt to change several meds from pills to oral liquids if possible and her Tegertol to 4 small 100mg tabs instead of large 400mg tablet.       Post operative pain:  Well controlled on Toradol, will continue for another 24 hours and then discontinue.  Will then start liquid Ibuprofen and dilaudid prn.  Aqua K pad, recliner, lidocaine patch.      Leukocytosis: Resolved.      SOO/Oliguria: resolved.      Fluid overload: Improved. Monitor.      DVT Prophylaxis:  Prophylactic Lovenox. Rx lovenox on discharge x 15 days. Will need teaching prior to discharge.     Disposition:  Likely to TCU Monday if bed available.               Interval History:   Pain well controlled on Toradol.  She had 400cc emesis after pills last night.  Now denies any N/V and is drinking water fine.  Passing flatus, no BM.  She is complaining most of sore throat and difficulty  "swallowing pills, \"feels like it gets stuck.\"  She notes difficulties with sore throat even prior to surgery but that it is much worse now.              Medications:   I have reviewed this patient's current medications               Physical Exam:   Blood pressure 120/63, pulse 92, temperature 98.6  F (37  C), temperature source Oral, resp. rate 16, height 1.727 m (5' 8\"), weight 77.9 kg (171 lb 11.2 oz), SpO2 93 %.        Vital Sign Ranges  Temperature Temp  Av.5  F (36.9  C)  Min: 97.9  F (36.6  C)  Max: 98.9  F (37.2  C)   Blood pressure Systolic (24hrs), Av , Min:131 , Max:146        Diastolic (24hrs), Av, Min:57, Max:73      Pulse Pulse  Av  Min: 90  Max: 90   Respirations Resp  Av.3  Min: 16  Max: 18   Pulse oximetry SpO2  Av.7 %  Min: 93 %  Max: 95 %         Intake/Output Summary (Last 24 hours) at 2020 0826  Last data filed at 2020 0600  Gross per 24 hour   Intake 5280 ml   Output 1850 ml   Net 3430 ml     Lungs:   Clear to auscultation but diminished at bases posteriorly     Cardiovascular:   normal apical pulses      Abdomen:   Active BS, minimal distension, non tender     Musculoskeletal:   no lower extremity pitting edema present                Data:   All laboratory data reviewed  "

## 2020-02-22 NOTE — PLAN OF CARE
Shift Note: VSS, afebrile, c/o pain in incision and low back. Toradol IV q6h, NG pulled, could change to Ibuprofen now, also #2 norco as needed. Up SBA, voiding and passing gas. PICC infusing TPN and fluids, tolerating clear liquids, can ADAT tomorrow. Plan is discharge to TCU pending bed and progress.

## 2020-02-22 NOTE — PLAN OF CARE
A&Ox4. VSS on RA. C/o incision pain, Toradol given x1 & ice applied to site. Frequent cough, c/o soreness to throat; lozenge given & chloroseptic spray ordered.  Pt had 1 episode of emesis with evening meds (400cc emesis). Zofran given x1. Up SBA to BR. Voiding adequately. Bowel sounds hypoactive, +gas. Continuous TPN & int Lipids infusing to R PICC. NPO. Plan to discharge to TCU pending.

## 2020-02-22 NOTE — PLAN OF CARE
Discharge Planner PT   Patient plan for discharge: TCU  Current status: Pt sitting up in reciner chair upon arrival; agreeable to therapy. STS transfer to FWW completed with CGA. Ambulated 500+ ft x 1 while pushing IV pole with CGA. Attempted ambulation without assistive device x 5 ft; pt unsteady and requested to return to using IV pole. Returned to recliner chair at end of session.  Barriers to return to prior living situation: Lives alone, impaired balance & fall risk, pain  Recommendations for discharge: TCU per plan established by the PT.  Rationale for recommendations: Patient lives alone and does not have assist available. Per pt report, she is fluctuating between 1-2 person assist for mobility. Patient would benefit from skilled PT intervention at TCU to maximize independence with functional mobility prior to return home.       Entered by: Lou Borja 02/22/2020 11:50 AM

## 2020-02-22 NOTE — PLAN OF CARE
VSS, afebrile, seems to be tolerating pain MUCH better. Offering toradol/ibuprofen as able, no norco today. Had shower, applied dressing, and started bowel meds today. Fair tolerance of clears, passing gas, MD hoping to stop TPN tomorrow. I advanced diet to fulls, encourage something more this PM.

## 2020-02-23 ENCOUNTER — APPOINTMENT (OUTPATIENT)
Dept: PHYSICAL THERAPY | Facility: CLINIC | Age: 60
DRG: 737 | End: 2020-02-23
Attending: OBSTETRICS & GYNECOLOGY
Payer: MEDICARE

## 2020-02-23 LAB
GLUCOSE BLDC GLUCOMTR-MCNC: 124 MG/DL (ref 70–99)
GLUCOSE BLDC GLUCOMTR-MCNC: 127 MG/DL (ref 70–99)
GLUCOSE BLDC GLUCOMTR-MCNC: 138 MG/DL (ref 70–99)
GLUCOSE BLDC GLUCOMTR-MCNC: 149 MG/DL (ref 70–99)
GLUCOSE BLDC GLUCOMTR-MCNC: 149 MG/DL (ref 70–99)

## 2020-02-23 PROCEDURE — 12000000 ZZH R&B MED SURG/OB

## 2020-02-23 PROCEDURE — 40000239 ZZH STATISTIC VAT ROUNDS

## 2020-02-23 PROCEDURE — 97116 GAIT TRAINING THERAPY: CPT | Mod: GP

## 2020-02-23 PROCEDURE — 25000132 ZZH RX MED GY IP 250 OP 250 PS 637: Mod: GY | Performed by: OBSTETRICS & GYNECOLOGY

## 2020-02-23 PROCEDURE — 25000128 H RX IP 250 OP 636: Performed by: OBSTETRICS & GYNECOLOGY

## 2020-02-23 PROCEDURE — 25000132 ZZH RX MED GY IP 250 OP 250 PS 637: Mod: GY | Performed by: NURSE PRACTITIONER

## 2020-02-23 PROCEDURE — 00000146 ZZHCL STATISTIC GLUCOSE BY METER IP

## 2020-02-23 PROCEDURE — 25000125 ZZHC RX 250: Performed by: OBSTETRICS & GYNECOLOGY

## 2020-02-23 PROCEDURE — 97530 THERAPEUTIC ACTIVITIES: CPT | Mod: GP

## 2020-02-23 PROCEDURE — 25800030 ZZH RX IP 258 OP 636: Performed by: OBSTETRICS & GYNECOLOGY

## 2020-02-23 RX ADMIN — INSULIN ASPART 1 UNITS: 100 INJECTION, SOLUTION INTRAVENOUS; SUBCUTANEOUS at 14:12

## 2020-02-23 RX ADMIN — INSULIN ASPART 1 UNITS: 100 INJECTION, SOLUTION INTRAVENOUS; SUBCUTANEOUS at 11:30

## 2020-02-23 RX ADMIN — CARBAMAZEPINE 400 MG: 100 CAPSULE, EXTENDED RELEASE ORAL at 09:17

## 2020-02-23 RX ADMIN — ENOXAPARIN SODIUM 40 MG: 40 INJECTION SUBCUTANEOUS at 09:23

## 2020-02-23 RX ADMIN — ASCORBIC ACID, VITAMIN A PALMITATE, CHOLECALCIFEROL, THIAMINE HYDROCHLORIDE, RIBOFLAVIN-5 PHOSPHATE SODIUM, PYRIDOXINE HYDROCHLORIDE, NIACINAMIDE, DEXPANTHENOL, ALPHA-TOCOPHEROL ACETATE, VITAMIN K1, FOLIC ACID, BIOTIN, CYANOCOBALAMIN: 200; 3300; 200; 6; 3.6; 6; 40; 15; 10; 150; 600; 60; 5 INJECTION, SOLUTION INTRAVENOUS at 12:57

## 2020-02-23 RX ADMIN — ONDANSETRON 4 MG: 4 TABLET, ORALLY DISINTEGRATING ORAL at 22:00

## 2020-02-23 RX ADMIN — POTASSIUM CHLORIDE, DEXTROSE MONOHYDRATE AND SODIUM CHLORIDE: 150; 5; 450 INJECTION, SOLUTION INTRAVENOUS at 07:56

## 2020-02-23 RX ADMIN — IBUPROFEN 400 MG: 200 SUSPENSION ORAL at 22:56

## 2020-02-23 RX ADMIN — SENNOSIDES 5 ML: 8.8 SYRUP ORAL at 09:17

## 2020-02-23 RX ADMIN — KETOROLAC TROMETHAMINE 30 MG: 30 INJECTION, SOLUTION INTRAMUSCULAR at 03:48

## 2020-02-23 RX ADMIN — IBUPROFEN 400 MG: 200 SUSPENSION ORAL at 09:42

## 2020-02-23 RX ADMIN — POLYETHYLENE GLYCOL 3350 17 G: 17 POWDER, FOR SOLUTION ORAL at 09:21

## 2020-02-23 RX ADMIN — ACETAMINOPHEN 650 MG: 160 SUSPENSION ORAL at 23:51

## 2020-02-23 RX ADMIN — OMEPRAZOLE 20 MG: 20 CAPSULE, DELAYED RELEASE ORAL at 16:44

## 2020-02-23 RX ADMIN — ATORVASTATIN CALCIUM 80 MG: 80 TABLET, FILM COATED ORAL at 22:57

## 2020-02-23 RX ADMIN — Medication 1 MG: at 22:56

## 2020-02-23 RX ADMIN — IBUPROFEN 400 MG: 200 SUSPENSION ORAL at 13:37

## 2020-02-23 RX ADMIN — PROCHLORPERAZINE EDISYLATE 10 MG: 5 INJECTION INTRAMUSCULAR; INTRAVENOUS at 23:51

## 2020-02-23 RX ADMIN — OMEPRAZOLE 20 MG: 20 CAPSULE, DELAYED RELEASE ORAL at 06:52

## 2020-02-23 RX ADMIN — I.V. FAT EMULSION 250 ML: 20 EMULSION INTRAVENOUS at 20:00

## 2020-02-23 RX ADMIN — CARBAMAZEPINE 400 MG: 100 CAPSULE, EXTENDED RELEASE ORAL at 20:00

## 2020-02-23 RX ADMIN — IBUPROFEN 400 MG: 200 SUSPENSION ORAL at 18:01

## 2020-02-23 ASSESSMENT — ACTIVITIES OF DAILY LIVING (ADL)
ADLS_ACUITY_SCORE: 13
ADLS_ACUITY_SCORE: 14

## 2020-02-23 NOTE — PLAN OF CARE
Discharge Planner PT   Patient plan for discharge: TCU  Current status: Pt supine in bed upon arrival; agreeable to therapy. Reports increased LB pain. Performed supine to sit with bed flat, using bedside rails with cues and SBA. STS x 2 to FWW completed with SBA. Static standing x 5 minutes to clean pt up due to soiled gown from BM smear.  Ambulated 600+ ft x 1 with FWW and SBA; tolerated well; fatigued towards end of ambulation. Transferred to chair at end of session.   Barriers to return to prior living situation: Lives alone, impaired balance & fall risk, pain  Recommendations for discharge: TCU per plan established by the PT.  Rationale for recommendations: Patient lives alone and does not have assist available. Patient would benefit from skilled PT intervention at TCU to maximize independence with functional mobility prior to return home.       Entered by: Lou Borja 02/23/2020 10:12 AM

## 2020-02-23 NOTE — PLAN OF CARE
7a-3p shift report  POD#13 of ADELE/BSO  POD#5 of  Explor Lap for SBO  Overall better this shift. Passing much more flatus and had very large loose brown stool. Abdomen  less distended.    Incisional pain is controlled with Ibuprofen given x1 abd wearing her abdominal binder. Writer also instructed pt on techniques how to get in/out that would lessen abd pain.Pt educated on use of IS and was instructed to use the IS for 5 minutes (taking 10 breaths) hourly while awake.   Appetite better - On FL diet and ate chocolate pudding and half bowl of soup for brkfst. Abd Incision CDI. VSS. Room air. PICC in place with TPN infusing. Lipids scheduled for evening. IV maintenance fluid was dc'd. Ambulating SBA/Walker.. Voiding.   Discharge pending diet tolerance and whenTCU  placement found.

## 2020-02-23 NOTE — PLAN OF CARE
Shift Update: A&O, up with SBA, advanced to full liquids, positive bowel sounds, passing flatus, and had a soft loose BM this evening, lido patch to low back, abd binder in place, midline incision with staples, TPN though PICC line, will get port placed 3/6, plan for TCU Monday

## 2020-02-23 NOTE — PROGRESS NOTES
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  POD13 X-lap, radical resection of left adnexal tumor with TAHBSO, left ureterolysis, omentectomy, left pelvic and para-aortic lymphadenectomy. Will need chemotherapy in adjuvant setting once recovered from surgery.  Will need port placement.  Pt's family has declined in patient placement, prefers to wait.   Has post op tx planning visit already scheduled for 3/6/20 at 4:00 in [facility] office of Dr. Bryson.      Postoperative SBO:  Passing flatus (not yet this am) and has good BS, small BM 2/22 recorded but patient denies.  NGT removed 2/21 pm and has been drinking clears/ensure boost fine.  PICC line infusing TPN. Encouraged full liquids and soft diet. Continue TPN until tolerating adequate diet, hopefully discontinue tomorrow am, will discontinue MIVF today.  Will consult dietician as well.    Dysphagia/Throat discomfort: Likely due to intubation x 2.  Lozenges and Cholarseptic spray prn and may chew gum.  Requested Tegertol to be given as 4 small 100mg tabs instead of large 400mg tablet.       Post operative pain:  Well controlled on Toradol and Tylenol, will discontinue Toradol and start liquid Ibuprofen, tylenol and dilaudid prn.  Aqua K pad, recliner, lidocaine patch.      Leukocytosis: Resolved.      SOO/Oliguria: resolved.      Fluid overload: Improved. Monitor.  No I/Os recorded in last 24hrs; RN aware and will monitor today.     DVT Prophylaxis:  Prophylactic Lovenox. Rx lovenox on discharge x 15 days. Will need teaching prior to discharge.     Disposition:  Likely to TCU Monday if bed available.               Interval History:   Pain well controlled on Toradol and Tylenol.  She has been drinking her Ensure Boost and small amounts of water/brooth/jello.  She did not want any full liquids yesterday per  "RN.  She had small BM yesterday per RN charting but patient denies this.  She denies any N/V.  Passing flatus, but not yet this am.  She again is complaining most of her sore throat and difficulty swallowing pills.  She notes difficulties with sore throat even prior to surgery but that it is much worse now.              Medications:   I have reviewed this patient's current medications               Physical Exam:   Blood pressure 107/54, pulse 96, temperature 96.9  F (36.1  C), temperature source Oral, resp. rate 16, height 1.727 m (5' 8\"), weight 77.9 kg (171 lb 11.2 oz), SpO2 94 %.        Vital Sign Ranges  Temperature Temp  Av.5  F (36.9  C)  Min: 97.9  F (36.6  C)  Max: 98.9  F (37.2  C)   Blood pressure Systolic (24hrs), Av , Min:131 , Max:146        Diastolic (24hrs), Av, Min:57, Max:73      Pulse Pulse  Av  Min: 90  Max: 90   Respirations Resp  Av.3  Min: 16  Max: 18   Pulse oximetry SpO2  Av.7 %  Min: 93 %  Max: 95 %         Intake/Output Summary (Last 24 hours) at 2020 0826  Last data filed at 2020 0600  Gross per 24 hour   Intake 5280 ml   Output 1850 ml   Net 3430 ml     Lungs:   Clear to auscultation, no dyspnea     Cardiovascular:   normal apical pulses      Abdomen:   Active BS, mild distension, non tender.  Inc dressed, dry, intact.     Musculoskeletal:   no lower extremity pitting edema present                Data:   All laboratory data reviewed  "

## 2020-02-23 NOTE — CONSULTS
"Clinical Nutrition - Brief Note    Received Provider Order - \"dysphagia and on TPN; please advise for liquid supplementation\"    RD currently following patient for TPN management. Please refer to most recent RD assessment on 02/20 for details    Visited with patient. Diet advanced to FLD today. Reports tolerance of pudding, cream of rice, and hot chocolate --> consumed ~50% of each thus far today. C/o sore throat and poor appetite. She has tried the Boost Breeze (peach) and enjoys it.    Reviewed additional supplements with patient and determine the following plan:  - 10 am Boost Breeze Peach  - 2 pm Boost Plus Shake (with ice cream) Chocolate    Encouraged consistent oral intakes    RD will continue to follow patient per protocol    Katherine Eid RD, LD  Clinical Dietitian     "

## 2020-02-23 NOTE — PLAN OF CARE
"Primary Diagnosis: POD12 EXPLORATORY LAPAROTOMY  TOTAL ABDOMINAL HYSTERECTOMY,  WITH BILATERAL SALPINGO-OOPHORECTOMY, TUMOR DEBULKING, OMENTECTOMY, PELVIC PARA  AORTIC LYMPHADENECTOMY, LEFT URETERAL LYSIS  **POD#4 exploratory laparotomy, (d/t SBO)  Orientation: A&Ox4  Aggression Stop Light: Green  Mobility: SBA w-GB   Pain Management: PRN Toradol, Tylenol, Ice  Diet: Full-ADAT. Pt states she hasn't tried full liquids yet, \"only jello\" yesterday (2/22)  Bowel/Bladder:Voiding. Had small BM yesterday. Now states she hasn't been passing gas.  Abnormal Lab/Assessments: Abdomen firm and distended.   Drain/Device/Wound:  PICC = TPN @ 80 and IVF (total 125/hr)  Consults:   D/C Day/Goals/Place: Plan for TCU. Day TBD.    Shift Update:     Increased abdominal pain at times (incisional). Binder in place. Tylenol and Toradol given with effectiveness. Abdomen firm and distended, passing claims to not be passing flatus. No appetite. Full liquid diet but has only had small amounts of clear liquids at this point. Incision CDI with staples. VSS. Room air. PICC in place with TPN an lipids infusing. Ambulating SBA W. A&O. Voiding. Plan to eventually discharge to TCU once bowels fully functional and placement is found.  "

## 2020-02-24 LAB
ALBUMIN SERPL-MCNC: 2.1 G/DL (ref 3.4–5)
ALP SERPL-CCNC: 184 U/L (ref 40–150)
ALT SERPL W P-5'-P-CCNC: 164 U/L (ref 0–50)
ANION GAP SERPL CALCULATED.3IONS-SCNC: 7 MMOL/L (ref 3–14)
AST SERPL W P-5'-P-CCNC: 132 U/L (ref 0–45)
BILIRUB SERPL-MCNC: 0.3 MG/DL (ref 0.2–1.3)
BUN SERPL-MCNC: 17 MG/DL (ref 7–30)
CALCIUM SERPL-MCNC: 8.3 MG/DL (ref 8.5–10.1)
CHLORIDE SERPL-SCNC: 104 MMOL/L (ref 94–109)
CO2 SERPL-SCNC: 23 MMOL/L (ref 20–32)
COPATH REPORT: NORMAL
CREAT SERPL-MCNC: 0.67 MG/DL (ref 0.52–1.04)
GFR SERPL CREATININE-BSD FRML MDRD: >90 ML/MIN/{1.73_M2}
GLUCOSE BLDC GLUCOMTR-MCNC: 105 MG/DL (ref 70–99)
GLUCOSE BLDC GLUCOMTR-MCNC: 115 MG/DL (ref 70–99)
GLUCOSE BLDC GLUCOMTR-MCNC: 128 MG/DL (ref 70–99)
GLUCOSE BLDC GLUCOMTR-MCNC: 130 MG/DL (ref 70–99)
GLUCOSE BLDC GLUCOMTR-MCNC: 135 MG/DL (ref 70–99)
GLUCOSE SERPL-MCNC: 118 MG/DL (ref 70–99)
INR PPP: 1.04 (ref 0.86–1.14)
MAGNESIUM SERPL-MCNC: 2.1 MG/DL (ref 1.6–2.3)
PHOSPHATE SERPL-MCNC: 3.6 MG/DL (ref 2.5–4.5)
POTASSIUM SERPL-SCNC: 3.9 MMOL/L (ref 3.4–5.3)
PREALB SERPL IA-MCNC: 25 MG/DL (ref 15–45)
PROT SERPL-MCNC: 5.6 G/DL (ref 6.8–8.8)
SODIUM SERPL-SCNC: 134 MMOL/L (ref 133–144)
TRIGL SERPL-MCNC: 206 MG/DL

## 2020-02-24 PROCEDURE — 25000132 ZZH RX MED GY IP 250 OP 250 PS 637: Mod: GY | Performed by: OBSTETRICS & GYNECOLOGY

## 2020-02-24 PROCEDURE — 84134 ASSAY OF PREALBUMIN: CPT | Performed by: OBSTETRICS & GYNECOLOGY

## 2020-02-24 PROCEDURE — 25000132 ZZH RX MED GY IP 250 OP 250 PS 637: Mod: GY | Performed by: NURSE PRACTITIONER

## 2020-02-24 PROCEDURE — 83735 ASSAY OF MAGNESIUM: CPT | Performed by: OBSTETRICS & GYNECOLOGY

## 2020-02-24 PROCEDURE — 00000146 ZZHCL STATISTIC GLUCOSE BY METER IP

## 2020-02-24 PROCEDURE — 40000239 ZZH STATISTIC VAT ROUNDS

## 2020-02-24 PROCEDURE — 84100 ASSAY OF PHOSPHORUS: CPT | Performed by: OBSTETRICS & GYNECOLOGY

## 2020-02-24 PROCEDURE — 25000128 H RX IP 250 OP 636: Performed by: OBSTETRICS & GYNECOLOGY

## 2020-02-24 PROCEDURE — 84478 ASSAY OF TRIGLYCERIDES: CPT | Performed by: OBSTETRICS & GYNECOLOGY

## 2020-02-24 PROCEDURE — 80053 COMPREHEN METABOLIC PANEL: CPT | Performed by: OBSTETRICS & GYNECOLOGY

## 2020-02-24 PROCEDURE — 12000000 ZZH R&B MED SURG/OB

## 2020-02-24 PROCEDURE — 85610 PROTHROMBIN TIME: CPT | Performed by: OBSTETRICS & GYNECOLOGY

## 2020-02-24 RX ORDER — HYDROMORPHONE HYDROCHLORIDE 1 MG/ML
2 SOLUTION ORAL EVERY 4 HOURS PRN
Qty: 20 ML | Refills: 0 | Status: SHIPPED | OUTPATIENT
Start: 2020-02-24

## 2020-02-24 RX ORDER — CALCIUM CARBONATE 500 MG/1
1000 TABLET, CHEWABLE ORAL EVERY 4 HOURS PRN
Status: DISCONTINUED | OUTPATIENT
Start: 2020-02-24 | End: 2020-02-27 | Stop reason: HOSPADM

## 2020-02-24 RX ADMIN — Medication 1 LOZENGE: at 00:41

## 2020-02-24 RX ADMIN — IBUPROFEN 400 MG: 200 SUSPENSION ORAL at 18:45

## 2020-02-24 RX ADMIN — CARBAMAZEPINE 400 MG: 100 CAPSULE, EXTENDED RELEASE ORAL at 10:14

## 2020-02-24 RX ADMIN — OMEPRAZOLE 20 MG: 20 CAPSULE, DELAYED RELEASE ORAL at 18:45

## 2020-02-24 RX ADMIN — OMEPRAZOLE 20 MG: 20 CAPSULE, DELAYED RELEASE ORAL at 06:22

## 2020-02-24 RX ADMIN — ACETAMINOPHEN 650 MG: 160 SUSPENSION ORAL at 16:21

## 2020-02-24 RX ADMIN — ONDANSETRON 4 MG: 4 TABLET, ORALLY DISINTEGRATING ORAL at 16:20

## 2020-02-24 RX ADMIN — CARBAMAZEPINE 400 MG: 100 CAPSULE, EXTENDED RELEASE ORAL at 20:41

## 2020-02-24 RX ADMIN — CALCIUM CARBONATE (ANTACID) CHEW TAB 500 MG 1000 MG: 500 CHEW TAB at 20:40

## 2020-02-24 RX ADMIN — IBUPROFEN 400 MG: 200 SUSPENSION ORAL at 06:22

## 2020-02-24 RX ADMIN — ATORVASTATIN CALCIUM 80 MG: 80 TABLET, FILM COATED ORAL at 20:41

## 2020-02-24 RX ADMIN — ENOXAPARIN SODIUM 40 MG: 40 INJECTION SUBCUTANEOUS at 10:14

## 2020-02-24 RX ADMIN — ACETAMINOPHEN 650 MG: 160 SUSPENSION ORAL at 10:14

## 2020-02-24 RX ADMIN — Medication 1 MG: at 23:28

## 2020-02-24 ASSESSMENT — ACTIVITIES OF DAILY LIVING (ADL)
ADLS_ACUITY_SCORE: 14
ADLS_ACUITY_SCORE: 12
ADLS_ACUITY_SCORE: 13

## 2020-02-24 NOTE — PLAN OF CARE
A&O. VSS. Had increasing pain after eating from low fiber diet along with nausea, zofran given but pt then began dry-heaving so compazine given as well. No emesis. Left diet order on low fiber but encouraging pt to choose light options and take it slow. Abd still slightly distended, non tender. Midline incision w/ staples, dressing changed. Scant serous drainage on old dressing, unable to tell from looking at incision where drainage came from. BS active throughout, passing flatus and had two loose BM's overnight. Mild edema to BLE. PICC infusing TPN + lipids. Ind in room. Ambulates halls SBA. Discharge pending, PT recommending TCU.

## 2020-02-24 NOTE — PLAN OF CARE
Pt A/O.  Vitally stable.  Ambulating with minimal SBA and gait belt without walker in hallway for several laps this shift.  Ibuprofen given for pain.  +flatus.  Continue to monitor.

## 2020-02-24 NOTE — DISCHARGE SUMMARY
"HOSPITAL DISCHARGE SUMMARY    Patient Name: Charlene Dia  YOB: 1960 Age: 59 year old  Medical Record Number: [id number]  Primary Physician: Remington Ramirez  Phone: [phone]  Admission Date: 2/10/2020  Discharge Date: 2/25/20    Charlene Dia  will be discharged from [facility] to Home.    PRINCIPAL DISCHARGE DIAGNOSIS: Stage IIc (path to addend report to reflect this) clear cell ovarian cancer   FINAL DIAGNOSIS:   A: Ovary and fallopian tube, left, resection   - Clear cell ovarian carcinoma, benign fallopian tube     B: Soft tissue, presacral peritoneum, excision   - No evidence of malignancy     C: Uterus, right fallopian tube and ovary, resection   - Atrophic endometrium, benign ovary and fallopian tube     D: Soft tissue, left pelvic peritoneum, biopsy   - Metastatic clear cell carcinoma     E: Omentum, resection   - No evidence of malignancy     F: Lymph nodes, left periaortic, excision   - Benign lymph nodes (4)     G: Lymph nodes, left pelvic, excision   - Benign lymph nodes (5)     BRIEF HOSPITAL COURSE: This 59 year old female admitted following the below listed procedures. She developed a post operative bowel obstruction and was taken back to the OR on 2/19/20 for a Xl, enterolysis. In the interim, she was placed on TPN and a PICC line was r/t malnutrision. She eventually started having flatus and +BM's. She was discharged to home on POD #17 with adequate pain control, tolerating orals, voiding and ambulating. She was discharged on 15 additional days of Lovenox.     PROCEDURES PERFORMED DURING HOSPITALIZATION:       COMPLICATIONS IN HOSPITAL: None    CONSULTATIONS:  PT/OT/SW    PERTINENT FINDINGS/RESULTS AT DISCHARGE:   /66 (BP Location: Left arm)   Pulse 96   Temp 97.4  F (36.3  C) (Oral)   Resp 16   Ht 1.727 m (5' 8\")   Wt 77.9 kg (171 lb 11.2 oz)   SpO2 95%   BMI 26.11 kg/m      Latest Laboratory Results:  Chem:  CBC RESULTS:   Recent Labs   Lab Test " 02/22/20  0600 02/19/20  0600   WBC  --  7.1   RBC  --  3.35*   HGB  --  10.0*   HCT  --  30.8*   MCV  --  92   MCH  --  29.9   MCHC  --  32.5   RDW  --  13.7   * 469*     Last Basic Metabolic Panel:  Lab Results   Component Value Date     02/24/2020      Lab Results   Component Value Date    POTASSIUM 3.9 02/24/2020     Lab Results   Component Value Date    CHLORIDE 104 02/24/2020     Lab Results   Component Value Date    FRED 8.3 02/24/2020     Lab Results   Component Value Date    CO2 23 02/24/2020     Lab Results   Component Value Date    BUN 17 02/24/2020     Lab Results   Component Value Date    CR 0.67 02/24/2020     Lab Results   Component Value Date     02/24/2020         IMPORTANT PENDING TEST RESULTS:  Pathology    CONDITION AT DISCHARGE:    Stabilized    DISCHARGE ORDERS  Current Discharge Medication List      START taking these medications    Details   enoxaparin ANTICOAGULANT (LOVENOX ANTICOAGULANT) 40 MG/0.4ML syringe Inject 0.4 mLs (40 mg) Subcutaneous daily  Qty: 15 Syringe, Refills: 0    Associated Diagnoses: Pelvic mass      HYDROmorphone, STANDARD CONC, (DILAUDID) 1 MG/ML oral solution Take 2 mLs (2 mg) by mouth every 4 hours as needed for moderate to severe pain  Qty: 20 mL, Refills: 0    Associated Diagnoses: Pelvic mass      ibuprofen (ADVIL/MOTRIN) 600 MG tablet Take 1 tablet (600 mg) by mouth every 6 hours as needed for moderate pain  Qty: 30 tablet, Refills: 1    Associated Diagnoses: Pelvic mass      senna (SENOKOT) 8.6 MG tablet Take 1-3 tablets by mouth 2 times daily as needed for constipation  Qty: 30 tablet, Refills: 0    Associated Diagnoses: Pelvic mass         CONTINUE these medications which have NOT CHANGED    Details   atorvastatin (LIPITOR) 80 MG tablet Take 80 mg by mouth At Bedtime      carbamazepine (TEGRETOL XR) 100 MG 12 hr tablet Take 400 mg by mouth 2 times daily (Takes 4 x 100mg = 400mg)      cyclobenzaprine (FLEXERIL) 5 MG tablet Take 5 mg by mouth  nightly as needed for muscle spasms      losartan (COZAAR) 50 MG tablet Take 50 mg by mouth daily      MELATONIN PO Take 10-15 mg by mouth every evening      naphazoline-pheniramine (OPCON-A/VISINE A/NAPHCON A) SOLN ophthalmic solution Place 1-2 drops into both eyes 4 times daily as needed for irritation      sodium chloride (OCEAN) 0.65 % nasal spray Spray 1 spray into both nostrils every morning      timolol maleate (TIMOPTIC) 0.5 % ophthalmic solution Place 1 drop into both eyes every morning             DISCHARGE INSTRUCTION.      FOLLOW-UP: Charlene Dia should see Remington Ramirez.    Specialty follow-up: as above.     AFTER HOSPITAL RECOMMENDATIONS  As above.      Physician(s) in addition to primary physician who should receive a copy:  Remington Ramirez APRN CNP

## 2020-02-24 NOTE — PROGRESS NOTES
LIZABETH Note:    D/I:  SW placed calls to facilities to inquire about bed availability for patient.      [facility] at [facility]: Requested PT/OT notes. LIZABETH faxed to liaison.  [facility]: Left VM  [facility]: Requested PT/OT notes. LIZABETH faxed to liaison.  [facility]: Left VM  [facility]: Left VM  [facility]: Left VM  [facility]: Left VM  [facility]: No beds available    P: SW will continue to assist and follow for discharge.     TIKI Jimenez, LGSW  [phone]  [facility]

## 2020-02-24 NOTE — PROGRESS NOTES
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  POD14 X-lap, radical resection of left adnexal tumor with TAHBSO, left ureterolysis, omentectomy, left pelvic and para-aortic lymphadenectomy. Will need chemotherapy in adjuvant setting once recovered from surgery.  Will need port placement.  Pt's family has declined in patient placement, prefers to wait.   Has post op tx planning visit already scheduled for 3/6/20 at 4:00 in [facility] office of Dr. Bryson.      Postoperative SBO:  Passing flatus (not yet this am) and has good BS, Two liquid stools yesterday.  .  Had N/V last night after soft diet, now on low fiber diet.  PICC line infusing TPN.  Continue TPN until tolerating adequate diet, hopefully discontinue tomorrow am.      Dysphagia/Throat discomfort: Likely due to intubation x 2.  Lozenges and Cholarseptic spray prn and may chew gum.  Requested Tegertol to be given as 4 small 100mg tabs instead of large 400mg tablet.       Post operative pain:  Well controlled on Toradol and Tylenol, will discontinue Toradol and start liquid Ibuprofen, tylenol and dilaudid prn.  Aqua K pad, recliner, lidocaine patch.      Leukocytosis: Resolved.      SOO/Oliguria: resolved.      Fluid overload: Improved. Monitor.  No I/Os recorded in last 24hrs; RN aware and will monitor today.     DVT Prophylaxis:  Prophylactic Lovenox. Rx lovenox on discharge x 15 days. Will need teaching prior to discharge.      Disposition:  Likely to TCU Tuesday if bed available and eating better with no N/V and abdominal distension improved.                   Interval History:   Moving better.  N/V last night after dinner              Medications:   I have reviewed this patient's current medications               Physical Exam:   Blood pressure 133/66, pulse 96, temperature 97.4  F (36.3  C),  "temperature source Oral, resp. rate 16, height 1.727 m (5' 8\"), weight 77.9 kg (171 lb 11.2 oz), SpO2 95 %.        Vital Sign Ranges  Temperature Temp  Av.6  F (37  C)  Min: 97.4  F (36.3  C)  Max: 100.2  F (37.9  C)   Blood pressure Systolic (24hrs), Av , Min:120 , Max:133        Diastolic (24hrs), Av, Min:50, Max:67      Pulse No data recorded   Respirations Resp  Avg: 15.3  Min: 14  Max: 16   Pulse oximetry SpO2  Av.3 %  Min: 95 %  Max: 96 %         Intake/Output Summary (Last 24 hours) at 2020 0844  Last data filed at 2020 0600  Gross per 24 hour   Intake 1440 ml   Output 1075 ml   Net 365 ml       Abdomen:   Mildly distended, incision fine, excellent BS     Musculoskeletal:   no lower extremity pitting edema present                Data:   All laboratory data reviewed  "

## 2020-02-24 NOTE — PROGRESS NOTES
"CLINICAL NUTRITION SERVICES - REASSESSMENT NOTE      Malnutrition: 2/17  % Weight Loss:  None noted  % Intake:  </= 50% for >/= 5 days (severe malnutrition)  Subcutaneous Fat Loss:  None observed  Muscle Loss:  None observed  Fluid Retention:  \"fluid overload\" (likely not nutrition related)     Malnutrition Diagnosis: Patient does not meet two of the above criteria necessary for diagnosing malnutrition     EVALUATION OF PROGRESS TOWARD GOALS   Diet:  Low fiber + Boost Breeze Peach 10 am, Boost Plus Shake Chocolate 2 PM     Nutrition Support: TPN was running as below but to be discontinued today per MD order     Nutrition Support Parenteral:  Type of Access: Central   Parenteral Frequency:  Continuous  Parenteral Regimen: Clinimix D15 AA5  @ 80 ml/hr + 250 ml of 20% IV lipids daily   Total Parenteral Provisions: 288g Dex daily (979kcal), 96g AA daily (384kcal) and 250 ml of 20% IV lipids daily = 500 kcals/day, 1.4 g PRO/kg/day, GIR 2.98 with 27% kcals from Fat.  Total kcal: 1863 (28 kcal/kg)    Intake/Tolerance (oral):   I/O 1440/1075  2/23: 0% intake Breakfast, 25% intake Lunch, per flowsheet   Per RN note on 2/24: Pt has increased pain after eating from low fiber diet and intermittent nausea.       Intake/Tolerance (TPN):  K+ Mg++, Phos all WNL    (H)    (H),  (H) and Alk Phos 184 (H)   < 115< 130< 127< 124< 149    ASSESSED NUTRITION NEEDS:  Dosing Weight:  67 kg--adjusted     Energy Needs: 0193-2608 kcals (25-30 Kcal/Kg) - overweight  Protein Needs:  grams protein (1.2-1.5 g pro/Kg) - post-op     NEW FINDINGS:   Last BM 2/23   Likely to discharge to TCU on 2/25  Wt: 77.9 kg--same as admit weight     Previous Goals:   TPN + Lipids will meet % of assessed needs  Evaluation: Not Met      Previous Nutrition Diagnosis:   Altered GI function related to small bowel obstruction as evidenced by NPO status x2 days and reliance on TPN to meet assessed needs.   Evaluation: " Improving    CURRENT NUTRITION DIAGNOSIS  Inadequate oral intake related to altered GI function (intermittment nausea) as evidenced by poor oral intake (0-25%) and recent reliance on TPN to meet assessed needs     INTERVENTIONS  Recommendations / Nutrition Prescription  Continue diet and supplements as above     Implementation  Collaboration and Referral of Nutrition care- Discussed changing lipids to every other day with pharmacist but later noted TPN/lipids stopped     Goals  Pt to consume 50% of meals and supplements in 3-4 days.    MONITORING AND EVALUATION:  Progress towards goals will be monitored and evaluated per protocol and Practice Guidelines    Ashley Loomis  Dietetic Intern

## 2020-02-25 ENCOUNTER — APPOINTMENT (OUTPATIENT)
Dept: PHYSICAL THERAPY | Facility: CLINIC | Age: 60
DRG: 737 | End: 2020-02-25
Attending: OBSTETRICS & GYNECOLOGY
Payer: MEDICARE

## 2020-02-25 LAB
ALBUMIN SERPL-MCNC: 2.1 G/DL (ref 3.4–5)
ALP SERPL-CCNC: 185 U/L (ref 40–150)
ALT SERPL W P-5'-P-CCNC: 154 U/L (ref 0–50)
ANION GAP SERPL CALCULATED.3IONS-SCNC: 6 MMOL/L (ref 3–14)
AST SERPL W P-5'-P-CCNC: 91 U/L (ref 0–45)
BILIRUB SERPL-MCNC: 0.4 MG/DL (ref 0.2–1.3)
BUN SERPL-MCNC: 17 MG/DL (ref 7–30)
CALCIUM SERPL-MCNC: 8.2 MG/DL (ref 8.5–10.1)
CHLORIDE SERPL-SCNC: 104 MMOL/L (ref 94–109)
CO2 SERPL-SCNC: 23 MMOL/L (ref 20–32)
CREAT SERPL-MCNC: 0.75 MG/DL (ref 0.52–1.04)
GFR SERPL CREATININE-BSD FRML MDRD: 85 ML/MIN/{1.73_M2}
GLUCOSE SERPL-MCNC: 103 MG/DL (ref 70–99)
MAGNESIUM SERPL-MCNC: 2 MG/DL (ref 1.6–2.3)
PHOSPHATE SERPL-MCNC: 3.3 MG/DL (ref 2.5–4.5)
PLATELET # BLD AUTO: 505 10E9/L (ref 150–450)
POTASSIUM SERPL-SCNC: 3.6 MMOL/L (ref 3.4–5.3)
PROT SERPL-MCNC: 5.3 G/DL (ref 6.8–8.8)
SODIUM SERPL-SCNC: 133 MMOL/L (ref 133–144)

## 2020-02-25 PROCEDURE — 80053 COMPREHEN METABOLIC PANEL: CPT | Performed by: OBSTETRICS & GYNECOLOGY

## 2020-02-25 PROCEDURE — 25000132 ZZH RX MED GY IP 250 OP 250 PS 637: Mod: GY | Performed by: NURSE PRACTITIONER

## 2020-02-25 PROCEDURE — 25000132 ZZH RX MED GY IP 250 OP 250 PS 637: Mod: GY | Performed by: OBSTETRICS & GYNECOLOGY

## 2020-02-25 PROCEDURE — 84100 ASSAY OF PHOSPHORUS: CPT | Performed by: OBSTETRICS & GYNECOLOGY

## 2020-02-25 PROCEDURE — 97116 GAIT TRAINING THERAPY: CPT | Mod: GP

## 2020-02-25 PROCEDURE — 40000239 ZZH STATISTIC VAT ROUNDS

## 2020-02-25 PROCEDURE — 12000000 ZZH R&B MED SURG/OB

## 2020-02-25 PROCEDURE — 85049 AUTOMATED PLATELET COUNT: CPT | Performed by: OBSTETRICS & GYNECOLOGY

## 2020-02-25 PROCEDURE — 40000257 ZZH STATISTIC CONSULT NO CHARGE VASC ACCESS

## 2020-02-25 PROCEDURE — 25000128 H RX IP 250 OP 636: Performed by: OBSTETRICS & GYNECOLOGY

## 2020-02-25 PROCEDURE — 83735 ASSAY OF MAGNESIUM: CPT | Performed by: OBSTETRICS & GYNECOLOGY

## 2020-02-25 RX ORDER — OMEPRAZOLE 20 MG/1
20 TABLET, DELAYED RELEASE ORAL 2 TIMES DAILY
Qty: 60 TABLET | Refills: 0 | Status: SHIPPED | OUTPATIENT
Start: 2020-02-25

## 2020-02-25 RX ORDER — FUROSEMIDE 40 MG
40 TABLET ORAL ONCE
Status: COMPLETED | OUTPATIENT
Start: 2020-02-25 | End: 2020-02-25

## 2020-02-25 RX ADMIN — SENNOSIDES 5 ML: 8.8 SYRUP ORAL at 21:29

## 2020-02-25 RX ADMIN — ENOXAPARIN SODIUM 40 MG: 40 INJECTION SUBCUTANEOUS at 09:25

## 2020-02-25 RX ADMIN — OMEPRAZOLE 20 MG: 20 CAPSULE, DELAYED RELEASE ORAL at 05:52

## 2020-02-25 RX ADMIN — Medication 1 MG: at 23:26

## 2020-02-25 RX ADMIN — LIDOCAINE 1 PATCH: 560 PATCH PERCUTANEOUS; TOPICAL; TRANSDERMAL at 10:37

## 2020-02-25 RX ADMIN — ACETAMINOPHEN 650 MG: 160 SUSPENSION ORAL at 19:58

## 2020-02-25 RX ADMIN — FUROSEMIDE 40 MG: 40 TABLET ORAL at 10:33

## 2020-02-25 RX ADMIN — OMEPRAZOLE 20 MG: 20 CAPSULE, DELAYED RELEASE ORAL at 17:41

## 2020-02-25 RX ADMIN — ATORVASTATIN CALCIUM 80 MG: 80 TABLET, FILM COATED ORAL at 21:29

## 2020-02-25 RX ADMIN — CARBAMAZEPINE 400 MG: 100 CAPSULE, EXTENDED RELEASE ORAL at 21:29

## 2020-02-25 RX ADMIN — CARBAMAZEPINE 400 MG: 100 CAPSULE, EXTENDED RELEASE ORAL at 09:25

## 2020-02-25 ASSESSMENT — ACTIVITIES OF DAILY LIVING (ADL)
ADLS_ACUITY_SCORE: 12

## 2020-02-25 ASSESSMENT — MIFFLIN-ST. JEOR: SCORE: 1403.67

## 2020-02-25 NOTE — PLAN OF CARE
Discharge Planner PT   Patient plan for discharge: TCU  Current status: Pt sitting up in chair upon arrival; agreeable to therapy. C/o LB pain. STS transfer completed with SBA. Ambulated 450 ft with FWW and SBA and 150 ft x 1 with no assistive device and CGA; mild unsteadiness but no overt LOB. Went up/down 10 stairs x 1 with R rail and CGA; tolerated well. Returned to chair at end of session.   Barriers to return to prior living situation: Lives alone, impaired balance & fall risk, pain  Recommendations for discharge: TCU per plan established by the PT.  Rationale for recommendations: Patient lives alone and does not have assist available. Patient would benefit from skilled PT intervention at TCU to maximize independence with functional mobility prior to return home.       Entered by: Lou Borja 02/25/2020 11:10 AM

## 2020-02-25 NOTE — PROGRESS NOTES
SW Note:    D/I:  Patient has been accepted by [facility] and patient and brother/sister-in-law in agreement with patient discharging to the facility.  SW completed Pre-admission screen and patient has triggered a level II assessment secondary to diagnosis of mental developmental delay.  Patient will need to have level II assessment prior to discharge from hospital.      PAS-RR    D: Per DHS regulation, SW completed and submitted PAS-RR to [place] [employer]ging Direct Connect via the Senior LinkAge Line.  PAS-RR confirmation # is : [id number]    I: SW spoke with patient and brother/ANURAG and they are aware a PAS-RR has been submitted.  SW reviewed with patient and brother/ANURAG that they may be contacted for a follow up appointment within 10 days of hospital discharge if their SNF stay is < 30 days.  Contact information for Senior LinkAge Line was also provided.    A: patient and brother/ANURAG verbalized understanding.    P: Further questions may be directed to Senior LinkAge Line at #[phone], option #4 for PAS-RR staff.    TIKI Jimenez, SW  [phone]  [facility]

## 2020-02-25 NOTE — PROGRESS NOTES
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  POD14 X-lap, radical resection of left adnexal tumor with TAHBSO, left ureterolysis, omentectomy, left pelvic and para-aortic lymphadenectomy. Will need chemotherapy in adjuvant setting once recovered from surgery.  Will need port placement.  Pt's family has declined in patient placement, prefers to wait.   Has post op tx planning visit already scheduled for 3/6/20 at 4:00 in [facility] office of Dr. Bryson.      Postoperative SBO:  Passing flatus and has good BS, having BM's. Tolerating low fiber diet.  TPN dc'd. Can discontinue PICC.      Dysphagia/Throat discomfort: Likely due to intubation x 2.  Lozenges and Cholarseptic spray prn and may chew gum. Prilosec BID rx.  Requested Tegertol to be given as 4 small 100mg tabs instead of large 400mg tablet.       Post operative pain:  Well controlled on Motrin and Tylenol, dilaudid prn.  Aqua K pad, recliner, lidocaine patch.      Leukocytosis: Resolved.      SOO/Oliguria: resolved.      Fluid overload: Improved. Monitor. Give lasix x 1 today.        DVT Prophylaxis:  Prophylactic Lovenox. Rx lovenox on discharge x 15 days. Needs teaching prior to discharge.      Disposition:  Can discharge to TCU today once bed available.     Stephany Hanley CNP  GYN ONC  Cell: [phone]               Interval History:   Ambulating. Tolerating low fiber diet. No N/V.               Medications:   I have reviewed this patient's current medications               Physical Exam:   Blood pressure 126/60, pulse 99, temperature 98.8  F (37.1  C), temperature source Oral, resp. rate 16, height 1.727 m (5' 8\"), weight 79 kg (174 lb 3.2 oz), SpO2 90 %.        Vital Sign Ranges  Temperature Temp  Av.6  F (37  C)  Min: 97.4  F (36.3  C)  Max: 100.2  F (37.9  C)   Blood pressure Systolic " (24hrs), Av , Min:120 , Max:133        Diastolic (24hrs), Av, Min:50, Max:67      Pulse No data recorded   Respirations Resp  Avg: 15.3  Min: 14  Max: 16   Pulse oximetry SpO2  Av.3 %  Min: 95 %  Max: 96 %         Intake/Output Summary (Last 24 hours) at 2020 0844  Last data filed at 2020 0600  Gross per 24 hour   Intake 1440 ml   Output 1075 ml   Net 365 ml       Abdomen:   Mildly distended, incision fine, excellent BS     Musculoskeletal:   no lower extremity pitting edema present, some edema in b/l feet                Data:   All laboratory data reviewed

## 2020-02-25 NOTE — PLAN OF CARE
A&Ox4. VSS on room air. C/o upset stomach - Tums given, effective. Ambulated the halls x2, independent. Denied nausea. Low fiber diet. Continent, voiding adequately. Midline incision KAMLA, RONNIEL. Abdominal binder in place. PICC SL, good blood return.

## 2020-02-25 NOTE — PLAN OF CARE
"Shift Update: VSS, afebrile, c/o \"gut pain\" after lunch, not really nausea, took tylenol and zofran to cover our bases. Up SBA in ames, indep in room. TPN stopped today, calls for needs, will discharge to TCU when bed located.  "

## 2020-02-25 NOTE — PROGRESS NOTES
ALEKSANDRE Picc line is due for redress today.  RN reports possible discharge to TCU today or tomorrow when bed available.  Site is c/d/i.  Plan is to remove Picc before discharge.  Will leave sticky not for MD to leave Picc removal order at discharge.  Defer dressing change unless dressing becomes non-occlusive. RN aware.

## 2020-02-26 LAB
INR PPP: 1.05 (ref 0.86–1.14)
PLATELET # BLD AUTO: 570 10E9/L (ref 150–450)

## 2020-02-26 PROCEDURE — 25000132 ZZH RX MED GY IP 250 OP 250 PS 637: Mod: GY | Performed by: OBSTETRICS & GYNECOLOGY

## 2020-02-26 PROCEDURE — 25000132 ZZH RX MED GY IP 250 OP 250 PS 637: Mod: GY | Performed by: NURSE PRACTITIONER

## 2020-02-26 PROCEDURE — 12000000 ZZH R&B MED SURG/OB

## 2020-02-26 PROCEDURE — 36415 COLL VENOUS BLD VENIPUNCTURE: CPT | Performed by: PHYSICIAN ASSISTANT

## 2020-02-26 PROCEDURE — 85610 PROTHROMBIN TIME: CPT | Performed by: PHYSICIAN ASSISTANT

## 2020-02-26 PROCEDURE — 25000128 H RX IP 250 OP 636: Performed by: OBSTETRICS & GYNECOLOGY

## 2020-02-26 PROCEDURE — 85049 AUTOMATED PLATELET COUNT: CPT | Performed by: PHYSICIAN ASSISTANT

## 2020-02-26 RX ORDER — FUROSEMIDE 20 MG
20 TABLET ORAL DAILY
Status: DISCONTINUED | OUTPATIENT
Start: 2020-02-26 | End: 2020-02-27 | Stop reason: HOSPADM

## 2020-02-26 RX ORDER — POTASSIUM CHLORIDE 20MEQ/15ML
20 LIQUID (ML) ORAL DAILY
Qty: 105 ML | Refills: 0 | Status: ON HOLD | OUTPATIENT
Start: 2020-02-26 | End: 2020-03-20

## 2020-02-26 RX ORDER — POTASSIUM CHLORIDE 20MEQ/15ML
20 LIQUID (ML) ORAL DAILY
Status: DISCONTINUED | OUTPATIENT
Start: 2020-02-26 | End: 2020-02-27 | Stop reason: HOSPADM

## 2020-02-26 RX ORDER — DEXTROSE MONOHYDRATE 25 G/50ML
25-50 INJECTION, SOLUTION INTRAVENOUS
Status: DISCONTINUED | OUTPATIENT
Start: 2020-02-26 | End: 2020-02-27 | Stop reason: HOSPADM

## 2020-02-26 RX ORDER — LIDOCAINE 40 MG/G
CREAM TOPICAL
Status: DISCONTINUED | OUTPATIENT
Start: 2020-02-26 | End: 2020-02-27 | Stop reason: HOSPADM

## 2020-02-26 RX ORDER — FUROSEMIDE 20 MG
20 TABLET ORAL DAILY
Qty: 7 TABLET | Refills: 0 | Status: SHIPPED | OUTPATIENT
Start: 2020-02-26

## 2020-02-26 RX ORDER — NICOTINE POLACRILEX 4 MG
15-30 LOZENGE BUCCAL
Status: DISCONTINUED | OUTPATIENT
Start: 2020-02-26 | End: 2020-02-27 | Stop reason: HOSPADM

## 2020-02-26 RX ADMIN — ENOXAPARIN SODIUM 40 MG: 40 INJECTION SUBCUTANEOUS at 09:13

## 2020-02-26 RX ADMIN — FUROSEMIDE 20 MG: 20 TABLET ORAL at 14:23

## 2020-02-26 RX ADMIN — LIDOCAINE 1 PATCH: 560 PATCH PERCUTANEOUS; TOPICAL; TRANSDERMAL at 15:45

## 2020-02-26 RX ADMIN — CARBAMAZEPINE 400 MG: 100 CAPSULE, EXTENDED RELEASE ORAL at 09:12

## 2020-02-26 RX ADMIN — CARBAMAZEPINE 400 MG: 100 CAPSULE, EXTENDED RELEASE ORAL at 20:11

## 2020-02-26 RX ADMIN — POTASSIUM CHLORIDE 20 MEQ: 1.5 SOLUTION ORAL at 14:23

## 2020-02-26 RX ADMIN — SENNOSIDES 5 ML: 8.8 SYRUP ORAL at 09:13

## 2020-02-26 RX ADMIN — SENNOSIDES 5 ML: 8.8 SYRUP ORAL at 20:15

## 2020-02-26 RX ADMIN — OMEPRAZOLE 20 MG: 20 CAPSULE, DELAYED RELEASE ORAL at 15:47

## 2020-02-26 RX ADMIN — Medication 1 LOZENGE: at 20:15

## 2020-02-26 RX ADMIN — ACETAMINOPHEN 650 MG: 160 SUSPENSION ORAL at 15:58

## 2020-02-26 RX ADMIN — ATORVASTATIN CALCIUM 80 MG: 80 TABLET, FILM COATED ORAL at 22:10

## 2020-02-26 RX ADMIN — Medication 1 MG: at 22:10

## 2020-02-26 RX ADMIN — OMEPRAZOLE 20 MG: 20 CAPSULE, DELAYED RELEASE ORAL at 06:30

## 2020-02-26 ASSESSMENT — ACTIVITIES OF DAILY LIVING (ADL)
ADLS_ACUITY_SCORE: 12
ADLS_ACUITY_SCORE: 12
ADLS_ACUITY_SCORE: 13
ADLS_ACUITY_SCORE: 12
ADLS_ACUITY_SCORE: 13
ADLS_ACUITY_SCORE: 12

## 2020-02-26 ASSESSMENT — MIFFLIN-ST. JEOR: SCORE: 1399.13

## 2020-02-26 NOTE — PLAN OF CARE
A&Ox4, forgetful at times. VSS on RA. No c/o of lower back pain, N/V. Ice applied to incision, abd binder with gauze in place. Removed every other staple today, KAMLA. Regular diet, tolerating well. Up independently, walking in room and halls today. No IV access. Plan for US paracentesis tomorrow @ 9:30 AM-HOLD LOVENOX. Discharge pending Level 2 screening.

## 2020-02-26 NOTE — PROGRESS NOTES
LIZABETH Note:    D/I:  SW received call from [facility] regarding Level II Assessment needing to be completed for patient prior to discharge to facility.  Because patient resides in [facility], she will need to be assessed by a [facility] worker.  [facility] provided phone number for [facility] Level II Assessors ([phone]). Per [facility] worker, assessors have up to 1 week to complete assessment but stated request could be made for it to be expedited. LIZABETH called to leave message to request assessment be expedited due to patient being ready for discharge.     Placed call to [facility] to update that Level II assessment will be needed prior to discharge.  Left VM for NP to update on delay in discharge.    TIKI Jimenez, LGSW  [phone]  [facility]

## 2020-02-26 NOTE — PLAN OF CARE
Pt A/O, forgetful. VSS on RA. C/o lower back pain, Tylenol given. Ice applied to incision. Midline incision w/ abdominal pad/binder. Regular diet. No IV access. Up independently. Discharge pending Level 2 screening.

## 2020-02-26 NOTE — PROGRESS NOTES
Planning paracentesis 2/27 at 9:30 am.  Discussed with OB service and will hold the morning dose of lovenox, orders entered.  SANJEEV Gordon

## 2020-02-26 NOTE — PROGRESS NOTES
"SPIRITUAL HEALTH SERVICES Progress Note  [facility]    Follow-up visit with pt.  She was sitting in a chair by her bed when I arrived.  Pt was much brighter today than during my past few visits with her.  Pt affirmed that she is feeling better.  She said her pain is better and she is feeling hopeful about possibility of discharge soon.  She described her understanding that she is \"in line\" for placement at a TCU, and will spend a couple of weeks in rehab before she returns home.     team available for support, per need or request.                                                                                                                                               Sierra Pablo M.A.  Staff   Pager [phone]  Phone [phone]      "

## 2020-02-26 NOTE — PLAN OF CARE
A&Ox4. VSS on RA, ex tachy. Denies N/V; occasional lower back pain. Tylenol and lidocaine patch effective. Patch put in place @ 10:30, remove @ 22:30. Midline incisions KAMLA with dry gauze and abd binder in place. Tolerating low fiber diet. Up independently, calls appropriately. PICC taken out today as pt was planning on discharging, no longer discharging to TCU as level 2 screening now needs to be completed. Doc is aware that pt has no IV access. Plan to discharge to TCU once screening is completed.

## 2020-02-26 NOTE — PROGRESS NOTES
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  POD15 X-lap, radical resection of left adnexal tumor with TAHBSO, left ureterolysis, omentectomy, left pelvic and para-aortic lymphadenectomy. Will need chemotherapy in adjuvant setting once recovered from surgery.  Incision with mild erythema around staples, likely irritation. Monitor for now. Will need port placement.  Pt's family has declined in patient placement, prefers to wait.   Has post op tx planning visit already scheduled for 3/6/20 at 4:00 in [facility] office of Dr. Bryson.      Postoperative SBO:  Passing flatus and has good BS, having BM's. Tolerating low fiber diet.  TPN dc'd.      Dysphagia/Throat discomfort: Likely due to intubation x 2.  Lozenges and Cholarseptic spray prn and may chew gum. Prilosec BID rx.  Requested Tegertol to be given as 4 small 100mg tabs instead of large 400mg tablet.       Post operative pain:  Well controlled on Motrin and Tylenol, dilaudid prn.  Aqua K pad, recliner, lidocaine patch.      Leukocytosis: Resolved.      SOO/Oliguria: resolved.      Fluid overload: Improved. Monitor. Give lasix x 1 today.        DVT Prophylaxis:  Prophylactic Lovenox. Rx lovenox on discharge x 15 days. Needs teaching prior to discharge.      Disposition:  Can discharge to TCU today once bed available. Per , needs level II assessment from the [facility], per , this can take up to 1 week. She is awaiting a call back today.     Stephany Hanley CNP  GYN ONC  Cell: [phone]    Patient seen and examined.  Incision OK with pao-staple erythema.  Will remove every other staple today.  Abdomen distended with fluid wave - will do US guided paracentesis.  Continue diuresis for LE edema.    LETTY Bryson MD               Interval History:   Ambulating. Tolerating low fiber diet. No N/V.               Medications:   I have reviewed this  "patient's current medications               Physical Exam:   Blood pressure 136/64, pulse 91, temperature 98.8  F (37.1  C), temperature source Oral, resp. rate 16, height 1.727 m (5' 8\"), weight 78.6 kg (173 lb 3.2 oz), SpO2 92 %.        Vital Sign Ranges  Temperature Temp  Av.6  F (37  C)  Min: 97.4  F (36.3  C)  Max: 100.2  F (37.9  C)   Blood pressure Systolic (24hrs), Av , Min:120 , Max:133        Diastolic (24hrs), Av, Min:50, Max:67      Pulse No data recorded   Respirations Resp  Avg: 15.3  Min: 14  Max: 16   Pulse oximetry SpO2  Av.3 %  Min: 95 %  Max: 96 %         Intake/Output Summary (Last 24 hours) at 2020 0844  Last data filed at 2020 0600  Gross per 24 hour   Intake 1440 ml   Output 1075 ml   Net 365 ml       Abdomen:   Mildly distended, incision fine, excellent BS     Musculoskeletal:   no lower extremity pitting edema present, some edema in b/l feet                Data:   All laboratory data reviewed  "

## 2020-02-27 ENCOUNTER — APPOINTMENT (OUTPATIENT)
Dept: ULTRASOUND IMAGING | Facility: CLINIC | Age: 60
DRG: 737 | End: 2020-02-27
Attending: NURSE PRACTITIONER
Payer: MEDICARE

## 2020-02-27 VITALS
TEMPERATURE: 98.2 F | SYSTOLIC BLOOD PRESSURE: 119 MMHG | RESPIRATION RATE: 16 BRPM | HEIGHT: 68 IN | BODY MASS INDEX: 26.25 KG/M2 | HEART RATE: 87 BPM | OXYGEN SATURATION: 94 % | WEIGHT: 173.2 LBS | DIASTOLIC BLOOD PRESSURE: 52 MMHG

## 2020-02-27 LAB
ALBUMIN SERPL-MCNC: 2.5 G/DL (ref 3.4–5)
ALP SERPL-CCNC: 210 U/L (ref 40–150)
ALT SERPL W P-5'-P-CCNC: 108 U/L (ref 0–50)
ANION GAP SERPL CALCULATED.3IONS-SCNC: 5 MMOL/L (ref 3–14)
AST SERPL W P-5'-P-CCNC: 50 U/L (ref 0–45)
BILIRUB SERPL-MCNC: 0.2 MG/DL (ref 0.2–1.3)
BUN SERPL-MCNC: 10 MG/DL (ref 7–30)
CALCIUM SERPL-MCNC: 8.9 MG/DL (ref 8.5–10.1)
CHLORIDE SERPL-SCNC: 106 MMOL/L (ref 94–109)
CO2 SERPL-SCNC: 25 MMOL/L (ref 20–32)
CREAT SERPL-MCNC: 0.66 MG/DL (ref 0.52–1.04)
GFR SERPL CREATININE-BSD FRML MDRD: >90 ML/MIN/{1.73_M2}
GLUCOSE SERPL-MCNC: 108 MG/DL (ref 70–99)
MAGNESIUM SERPL-MCNC: 2 MG/DL (ref 1.6–2.3)
PHOSPHATE SERPL-MCNC: 3.6 MG/DL (ref 2.5–4.5)
POTASSIUM SERPL-SCNC: 3.3 MMOL/L (ref 3.4–5.3)
PROT SERPL-MCNC: 6.5 G/DL (ref 6.8–8.8)
SODIUM SERPL-SCNC: 136 MMOL/L (ref 133–144)

## 2020-02-27 PROCEDURE — 0W9G3ZZ DRAINAGE OF PERITONEAL CAVITY, PERCUTANEOUS APPROACH: ICD-10-PCS | Performed by: RADIOLOGY

## 2020-02-27 PROCEDURE — 40000863 ZZH STATISTIC RADIOLOGY XRAY, US, CT, MAR, NM

## 2020-02-27 PROCEDURE — 80053 COMPREHEN METABOLIC PANEL: CPT | Performed by: OBSTETRICS & GYNECOLOGY

## 2020-02-27 PROCEDURE — 49083 ABD PARACENTESIS W/IMAGING: CPT

## 2020-02-27 PROCEDURE — 25000125 ZZHC RX 250: Performed by: RADIOLOGY

## 2020-02-27 PROCEDURE — 36415 COLL VENOUS BLD VENIPUNCTURE: CPT | Performed by: OBSTETRICS & GYNECOLOGY

## 2020-02-27 PROCEDURE — 84100 ASSAY OF PHOSPHORUS: CPT | Performed by: OBSTETRICS & GYNECOLOGY

## 2020-02-27 PROCEDURE — 25000132 ZZH RX MED GY IP 250 OP 250 PS 637: Mod: GY | Performed by: OBSTETRICS & GYNECOLOGY

## 2020-02-27 PROCEDURE — 25000132 ZZH RX MED GY IP 250 OP 250 PS 637: Mod: GY | Performed by: NURSE PRACTITIONER

## 2020-02-27 PROCEDURE — 25000128 H RX IP 250 OP 636: Performed by: OBSTETRICS & GYNECOLOGY

## 2020-02-27 PROCEDURE — 83735 ASSAY OF MAGNESIUM: CPT | Performed by: OBSTETRICS & GYNECOLOGY

## 2020-02-27 RX ORDER — LIDOCAINE HYDROCHLORIDE 10 MG/ML
10 INJECTION, SOLUTION EPIDURAL; INFILTRATION; INTRACAUDAL; PERINEURAL ONCE
Status: COMPLETED | OUTPATIENT
Start: 2020-02-27 | End: 2020-02-27

## 2020-02-27 RX ADMIN — ACETAMINOPHEN 650 MG: 160 SUSPENSION ORAL at 08:17

## 2020-02-27 RX ADMIN — ENOXAPARIN SODIUM 40 MG: 40 INJECTION SUBCUTANEOUS at 10:22

## 2020-02-27 RX ADMIN — CARBAMAZEPINE 400 MG: 100 CAPSULE, EXTENDED RELEASE ORAL at 08:09

## 2020-02-27 RX ADMIN — OMEPRAZOLE 20 MG: 20 CAPSULE, DELAYED RELEASE ORAL at 06:49

## 2020-02-27 RX ADMIN — LIDOCAINE HYDROCHLORIDE 10 ML: 10 INJECTION, SOLUTION EPIDURAL; INFILTRATION; INTRACAUDAL; PERINEURAL at 09:38

## 2020-02-27 RX ADMIN — POLYETHYLENE GLYCOL 3350 17 G: 17 POWDER, FOR SOLUTION ORAL at 08:09

## 2020-02-27 RX ADMIN — OMEPRAZOLE 20 MG: 20 CAPSULE, DELAYED RELEASE ORAL at 16:31

## 2020-02-27 RX ADMIN — POTASSIUM CHLORIDE 20 MEQ: 1.5 SOLUTION ORAL at 08:09

## 2020-02-27 RX ADMIN — FUROSEMIDE 20 MG: 20 TABLET ORAL at 08:10

## 2020-02-27 ASSESSMENT — ACTIVITIES OF DAILY LIVING (ADL)
ADLS_ACUITY_SCORE: 12

## 2020-02-27 NOTE — PLAN OF CARE
PT: Attempted to see pt for PT session. Chart reviewed, pt planning on US paracentesis at 9:30 this morning. Unable to see at this time.

## 2020-02-27 NOTE — PROGRESS NOTES
Care Suites Procedure Nursing Note    Patient Information  Name: Charlene Dia  Age: 59 year old    Procedure  Procedure:  Paracentesis  Timeout:  0936  Procedure start time: 0936  Procedure complete time: 0949  3.1 L aspirated.  Concerns/abnormal assessment: no/no  If abnormal assessment, provider notified: N/A  Plan/Other:  Return to 8th floor.   3011  Report to Garland Velasquez RN, including:  Dr. Callahan said Lovenox may be resumed immediately.    Bessy Valencia RN

## 2020-02-27 NOTE — PLAN OF CARE
A&Ox4.  Independent; steady gait observed.  Complained of 5/10 abdominal incisional pain; relieved with PRN Tylenol/ice to site.  Abdominal incision with gauze dressing, CDI, staples in-place (every other one removed today); abdominal binder in place. PRN throat lozenge given for complaints of sore throat.  No IV access.  Paracentesis tomorrow @ 0930 (no need to be NPO per orders).  Tolerating regular diet without problems.  Discharge pending Level 2 screening.

## 2020-02-27 NOTE — PROGRESS NOTES
"LIZABETH Note:    D/I:  Samantha from [facility] came out to hospital to meet with patient to complete level II assessment.  Patient stated that she is now going to be discharging to home with home care.  Per physician note from today, \"patient appears safe to return home with PT/OT.\"  Patient stated that her brother is coming today from [facility] to pick her up to transport her home.    Addendum: LIZABETH placed call to [facility] to update that patient would be discharging to home.     TIKI Jimenez, LGSW  [phone]  [facility]       "

## 2020-02-27 NOTE — PLAN OF CARE
A&Ox4. VSS ex tachycardic. C/o incisional pain, rest & repositioned. Lidocaine patch removed from lower back. Infrequent cough, non-productive. Independent in room. Adbominal incision with gauze dressing & abdominal binder in place. Regular diet, tolerating. Per pt, had 1 loose/watery stool this morning. No IV access, MD aware. Plan for paracentesis today at 0930.

## 2020-02-27 NOTE — CONSULTS
Met with patient to discuss HHPT/OT. She declines as she does not meet homebound criteria. She plans on driving to her post op visit on 3/6. She lives in a senior apartment- with bathroom rails, all one level. She has no concerns, she is independent in all ADL's and med management.  She does have lovinox injections ordered for discharge- bedside RN aware- she will do teaching

## 2020-02-27 NOTE — PROGRESS NOTES
"CLINICAL NUTRITION SERVICES - REASSESSMENT NOTE      Malnutrition: (2/17)  % Weight Loss:  None noted  % Intake:  </= 50% for >/= 5 days (severe malnutrition)  Subcutaneous Fat Loss:  None observed  Muscle Loss:  None observed  Fluid Retention:  \"fluid overload\" (likely not nutrition related)     Malnutrition Diagnosis: Patient does not meet two of the above criteria necessary for diagnosing malnutrition        EVALUATION OF PROGRESS TOWARD GOALS   Diet:    Regular  10am: peach Breeze  2pm: chocolate Boost shake    Intake/Tolerance:    Chart reviewed  Visited with pt this morning - \"I am leaving this afternoon\"  Tells me that she likes the supplements and is taking 100%  Has been eating her meals - waiting for brunch tray      NEW FINDINGS:   TCU when Level 2 screening is complete    Previous Goals (2/24):   Pt to consume 50% of meals and supplements in 3-4 days  Evaluation: Met    Previous Nutrition Diagnosis (2/24):   Inadequate oral intake related to altered GI function (intermittment nausea) as evidenced by poor oral intake (0-25%) and recent reliance on TPN to meet assessed needs    Evaluation: Improving, modified below      CURRENT NUTRITION DIAGNOSIS  No nutrition diagnosis identified at this time     INTERVENTIONS  Recommendations / Nutrition Prescription  Regular diet  Nutrition supplements    Implementation  Will continue with supplements between meals for added cals/pro    Goals  Pt to consume 75% meals and supplements      MONITORING AND EVALUATION:  Progress towards goals will be monitored and evaluated per protocol and Practice Guidelines        "

## 2020-02-27 NOTE — PLAN OF CARE
Pt Aox4, VSS on RA, IND. Tolerating regular, fiber restricted diet w/ adequate fluid intake. Pain controlled w/ tylenol and ibuprofen. No IV access. Ambulated in ames x2. Voiding w/o difficulty, + flatus, + BS, + BM. Incision CDI; dry gauze over staples to prevent rubbing from the ABD binder. Paracentesis completed this AM; 3L removed- Pt tolerated procedure. Plan to discharge home w/ brother this afternoon.

## 2020-02-27 NOTE — PROGRESS NOTES
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  POD16 X-lap, radical resection of left adnexal tumor with TAHBSO, left ureterolysis, omentectomy, left pelvic and para-aortic lymphadenectomy. Will need chemotherapy in adjuvant setting once recovered from surgery.  Incision with mild erythema around staples, likely irritation. Monitor for now. Will need port placement.  Pt's family has declined in patient placement, prefers to wait.   Has post op tx planning visit already scheduled for 3/6/20 at 4:00 in [facility] office of Dr. Bryson.   S/p paracentesis for 3.1L today.      Postoperative SBO:  Resolved. Passing flatus and has good BS, having BM's. Tolerating low fiber diet.  TPN dc'd.      Dysphagia/Throat discomfort: Likely due to intubation x 2. Resolved with below interventions. Lozenges and Cholarseptic spray prn and may chew gum. Prilosec BID rx.  Tegertol to be given as 4 small 100mg tabs instead of large 400mg tablet.       Post operative pain:  Well controlled on Motrin and Tylenol, dilaudid prn.  Aqua K pad, recliner, lidocaine patch.      Leukocytosis: Resolved.      SOO/Oliguria: resolved.      Fluid overload: Improved. Monitor. Give lasix x 1 today and rx for lasix at home to continue diuresis.        DVT Prophylaxis:  Prophylactic Lovenox. Rx lovenox on discharge x 15 days. Needs teaching prior to discharge, today.      Disposition:  Originally, patient to go to TCU. However, now due to prolonged hospital stay due to need to level 2 assessment patient appears safe to return home with PT/OT. I have asked that PT  Come assess patient and clear for home with [facility]. Patient up indepedently, walking halls, using bathroom etc. Pt's brother in [place] are her main support and can provide her with transportation this afternoon. Orders in place.     Will remove rest of staples next Friday 3/6 at  "appointment with Dr. Bryson.     Stephany Hanley, CNP  GYN ONC  Cell: [phone]               Interval History:   Ambulating. Tolerating low fiber diet. No N/V. Having BM's.               Medications:   I have reviewed this patient's current medications               Physical Exam:   Blood pressure 133/60, pulse 87, temperature 98.1  F (36.7  C), temperature source Oral, resp. rate 16, height 1.727 m (5' 8\"), weight 78.6 kg (173 lb 3.2 oz), SpO2 95 %.        Vital Sign Ranges  Temperature Temp  Av.6  F (37  C)  Min: 97.4  F (36.3  C)  Max: 100.2  F (37.9  C)   Blood pressure Systolic (24hrs), Av , Min:120 , Max:133        Diastolic (24hrs), Av, Min:50, Max:67      Pulse No data recorded   Respirations Resp  Avg: 15.3  Min: 14  Max: 16   Pulse oximetry SpO2  Av.3 %  Min: 95 %  Max: 96 %       No intake or output data in the 24 hours ending 20 1233      Abdomen:   Soft, incision with mild erythema, excellent BS     Musculoskeletal:   no lower extremity pitting edema present, some edema in b/l feet                Data:   All laboratory data reviewed  "

## 2020-02-28 NOTE — PLAN OF CARE
Physical Therapy Discharge Summary    Reason for therapy discharge:    Discharged to home.    Progress towards therapy goal(s). See goals on Care Plan in [facility] electronic health record for goal details.  Goals not met.  Barriers to achieving goals:   discharge from facility.    Therapy recommendation(s):    Continued therapy is recommended.  Rationale/Recommendations:  Pt will benefit from continued therapy to progress safety and independence with mobility toward baseline.

## 2020-03-19 NOTE — OR NURSING
During preop call, pt stated she has not traveled outside of the [place] in the last 21 days, denies fever, cough or sob.    Pt states she signs her own consent, no information regarding guardianship found in Epic.

## 2020-03-20 ENCOUNTER — ANESTHESIA EVENT (OUTPATIENT)
Dept: SURGERY | Facility: CLINIC | Age: 60
End: 2020-03-20
Payer: MEDICARE

## 2020-03-20 ENCOUNTER — APPOINTMENT (OUTPATIENT)
Dept: GENERAL RADIOLOGY | Facility: CLINIC | Age: 60
End: 2020-03-20
Attending: THORACIC SURGERY (CARDIOTHORACIC VASCULAR SURGERY)
Payer: MEDICARE

## 2020-03-20 ENCOUNTER — ANESTHESIA (OUTPATIENT)
Dept: SURGERY | Facility: CLINIC | Age: 60
End: 2020-03-20
Payer: MEDICARE

## 2020-03-20 ENCOUNTER — HOSPITAL ENCOUNTER (OUTPATIENT)
Facility: CLINIC | Age: 60
Discharge: HOME | End: 2020-03-20
Attending: THORACIC SURGERY (CARDIOTHORACIC VASCULAR SURGERY) | Admitting: THORACIC SURGERY (CARDIOTHORACIC VASCULAR SURGERY)
Payer: MEDICARE

## 2020-03-20 VITALS
HEART RATE: 85 BPM | WEIGHT: 155 LBS | RESPIRATION RATE: 18 BRPM | SYSTOLIC BLOOD PRESSURE: 114 MMHG | OXYGEN SATURATION: 97 % | DIASTOLIC BLOOD PRESSURE: 59 MMHG | TEMPERATURE: 97.5 F | BODY MASS INDEX: 23.57 KG/M2

## 2020-03-20 DIAGNOSIS — C56.9 MALIGNANT NEOPLASM OF OVARY, UNSPECIFIED LATERALITY (H): Primary | ICD-10-CM

## 2020-03-20 PROCEDURE — 25800030 ZZH RX IP 258 OP 636: Performed by: THORACIC SURGERY (CARDIOTHORACIC VASCULAR SURGERY)

## 2020-03-20 PROCEDURE — 36000054 ZZH SURGERY LEVEL 2 W FLUORO 1ST 30 MIN: Performed by: THORACIC SURGERY (CARDIOTHORACIC VASCULAR SURGERY)

## 2020-03-20 PROCEDURE — 37000009 ZZH ANESTHESIA TECHNICAL FEE, EACH ADDTL 15 MIN: Performed by: THORACIC SURGERY (CARDIOTHORACIC VASCULAR SURGERY)

## 2020-03-20 PROCEDURE — 25800030 ZZH RX IP 258 OP 636: Performed by: NURSE ANESTHETIST, CERTIFIED REGISTERED

## 2020-03-20 PROCEDURE — 25000128 H RX IP 250 OP 636: Performed by: THORACIC SURGERY (CARDIOTHORACIC VASCULAR SURGERY)

## 2020-03-20 PROCEDURE — 40000985 XR CHEST PORT 1 VW

## 2020-03-20 PROCEDURE — 37000008 ZZH ANESTHESIA TECHNICAL FEE, 1ST 30 MIN: Performed by: THORACIC SURGERY (CARDIOTHORACIC VASCULAR SURGERY)

## 2020-03-20 PROCEDURE — 27210794 ZZH OR GENERAL SUPPLY STERILE: Performed by: THORACIC SURGERY (CARDIOTHORACIC VASCULAR SURGERY)

## 2020-03-20 PROCEDURE — 71000027 ZZH RECOVERY PHASE 2 EACH 15 MINS: Performed by: THORACIC SURGERY (CARDIOTHORACIC VASCULAR SURGERY)

## 2020-03-20 PROCEDURE — 40000170 ZZH STATISTIC PRE-PROCEDURE ASSESSMENT II: Performed by: THORACIC SURGERY (CARDIOTHORACIC VASCULAR SURGERY)

## 2020-03-20 PROCEDURE — 25000125 ZZHC RX 250: Performed by: THORACIC SURGERY (CARDIOTHORACIC VASCULAR SURGERY)

## 2020-03-20 PROCEDURE — 25000128 H RX IP 250 OP 636: Performed by: NURSE ANESTHETIST, CERTIFIED REGISTERED

## 2020-03-20 PROCEDURE — 71000012 ZZH RECOVERY PHASE 1 LEVEL 1 FIRST HR: Performed by: THORACIC SURGERY (CARDIOTHORACIC VASCULAR SURGERY)

## 2020-03-20 PROCEDURE — C1788 PORT, INDWELLING, IMP: HCPCS | Performed by: THORACIC SURGERY (CARDIOTHORACIC VASCULAR SURGERY)

## 2020-03-20 DEVICE — CATH PORT POWERPORT CLEARVUE ISP 8FR 5608062: Type: IMPLANTABLE DEVICE | Site: CHEST | Status: FUNCTIONAL

## 2020-03-20 RX ORDER — PROPOFOL 10 MG/ML
INJECTION, EMULSION INTRAVENOUS CONTINUOUS PRN
Status: DISCONTINUED | OUTPATIENT
Start: 2020-03-20 | End: 2020-03-20

## 2020-03-20 RX ORDER — CEFAZOLIN SODIUM 1 G/3ML
INJECTION, POWDER, FOR SOLUTION INTRAMUSCULAR; INTRAVENOUS PRN
Status: DISCONTINUED | OUTPATIENT
Start: 2020-03-20 | End: 2020-03-20

## 2020-03-20 RX ORDER — HEPARIN SODIUM (PORCINE) LOCK FLUSH IV SOLN 100 UNIT/ML 100 UNIT/ML
SOLUTION INTRAVENOUS
Status: DISCONTINUED
Start: 2020-03-20 | End: 2020-03-20 | Stop reason: HOSPADM

## 2020-03-20 RX ORDER — FENTANYL CITRATE 50 UG/ML
INJECTION, SOLUTION INTRAMUSCULAR; INTRAVENOUS PRN
Status: DISCONTINUED | OUTPATIENT
Start: 2020-03-20 | End: 2020-03-20

## 2020-03-20 RX ORDER — ACETAMINOPHEN 325 MG/1
650 TABLET ORAL
Status: CANCELLED | OUTPATIENT
Start: 2020-03-20

## 2020-03-20 RX ORDER — LIDOCAINE HYDROCHLORIDE 10 MG/ML
INJECTION, SOLUTION INFILTRATION; PERINEURAL PRN
Status: DISCONTINUED | OUTPATIENT
Start: 2020-03-20 | End: 2020-03-20 | Stop reason: HOSPADM

## 2020-03-20 RX ORDER — SODIUM CHLORIDE, SODIUM LACTATE, POTASSIUM CHLORIDE, CALCIUM CHLORIDE 600; 310; 30; 20 MG/100ML; MG/100ML; MG/100ML; MG/100ML
INJECTION, SOLUTION INTRAVENOUS CONTINUOUS PRN
Status: DISCONTINUED | OUTPATIENT
Start: 2020-03-20 | End: 2020-03-20

## 2020-03-20 RX ORDER — HYDROCODONE BITARTRATE AND ACETAMINOPHEN 5; 325 MG/1; MG/1
1 TABLET ORAL EVERY 6 HOURS PRN
Qty: 3 TABLET | Refills: 0 | Status: SHIPPED | OUTPATIENT
Start: 2020-03-20

## 2020-03-20 RX ORDER — HYDROCODONE BITARTRATE AND ACETAMINOPHEN 5; 325 MG/1; MG/1
1 TABLET ORAL
Status: CANCELLED | OUTPATIENT
Start: 2020-03-20

## 2020-03-20 RX ORDER — ONDANSETRON 2 MG/ML
INJECTION INTRAMUSCULAR; INTRAVENOUS PRN
Status: DISCONTINUED | OUTPATIENT
Start: 2020-03-20 | End: 2020-03-20

## 2020-03-20 RX ORDER — HEPARIN SODIUM (PORCINE) LOCK FLUSH IV SOLN 100 UNIT/ML 100 UNIT/ML
SOLUTION INTRAVENOUS PRN
Status: DISCONTINUED | OUTPATIENT
Start: 2020-03-20 | End: 2020-03-20 | Stop reason: HOSPADM

## 2020-03-20 RX ADMIN — PHENYLEPHRINE HYDROCHLORIDE 100 MCG: 10 INJECTION INTRAVENOUS at 09:30

## 2020-03-20 RX ADMIN — ONDANSETRON 4 MG: 2 INJECTION INTRAMUSCULAR; INTRAVENOUS at 09:16

## 2020-03-20 RX ADMIN — SODIUM CHLORIDE, POTASSIUM CHLORIDE, SODIUM LACTATE AND CALCIUM CHLORIDE: 600; 310; 30; 20 INJECTION, SOLUTION INTRAVENOUS at 09:10

## 2020-03-20 RX ADMIN — CEFAZOLIN 2 G: 1 INJECTION, POWDER, FOR SOLUTION INTRAMUSCULAR; INTRAVENOUS at 09:14

## 2020-03-20 RX ADMIN — PHENYLEPHRINE HYDROCHLORIDE 100 MCG: 10 INJECTION INTRAVENOUS at 09:26

## 2020-03-20 RX ADMIN — FENTANYL CITRATE 25 MCG: 50 INJECTION, SOLUTION INTRAMUSCULAR; INTRAVENOUS at 09:13

## 2020-03-20 RX ADMIN — PROPOFOL 100 MCG/KG/MIN: 10 INJECTION, EMULSION INTRAVENOUS at 09:10

## 2020-03-20 RX ADMIN — FENTANYL CITRATE 25 MCG: 50 INJECTION, SOLUTION INTRAMUSCULAR; INTRAVENOUS at 09:16

## 2020-03-20 RX ADMIN — MIDAZOLAM 2 MG: 1 INJECTION INTRAMUSCULAR; INTRAVENOUS at 09:10

## 2020-03-20 RX ADMIN — PHENYLEPHRINE HYDROCHLORIDE 100 MCG: 10 INJECTION INTRAVENOUS at 09:22

## 2020-03-20 RX ADMIN — FENTANYL CITRATE 50 MCG: 50 INJECTION, SOLUTION INTRAMUSCULAR; INTRAVENOUS at 09:10

## 2020-03-20 ASSESSMENT — ENCOUNTER SYMPTOMS
DYSRHYTHMIAS: 0
SEIZURES: 1
ORTHOPNEA: 0

## 2020-03-20 ASSESSMENT — LIFESTYLE VARIABLES: TOBACCO_USE: 0

## 2020-03-20 NOTE — OR NURSING
Discharge instructions given to sister-in-law Thomas over the phone and with patient. Pt getting ready for discharge, dressed and in wheelchair per Angely LOZA

## 2020-03-20 NOTE — OP NOTE
DATE OF PROCEDURE:  March 20, 2020      SURGEON:  Iker Srivastava MD   FIRST ASSIST: Kerrie Marshall PA-C      PREOPERATIVE DIAGNOSIS:  ovarian cancer      POSTOPERATIVE DIAGNOSIS:  Same       PROCEDURE:  Placement of [place] PowerPort implantable port, left subclavian vein.       ANESTHESIA:  Local with lidocaine 1% without epinephrine and sedation.       INDICATIONS:  Patient will undergo chemotherapy and a PowerPort is indicated for venous access.       DESCRIPTION OF PROCEDURE:  The patient was brought to the OR and placed in supine position.  The patient was placed into Trendelenburg.  IV sedation was given.  The neck and upper chest were prepared and draped in the usual fashion using ChloraPrep.  Local anesthesia was performed with lidocaine 1% without epinephrine. The left subclavian vein was punctured easily with a 16-gauge needle on the first attempt.  There was excellent blood return.  A guidewire was advanced through the needle without any resistance.  The needle was removed.  A transverse incision was made along the guidewire.  Subcutaneous pocket was made inferior to the incision.  Hemostasis was verified and was excellent.  An introducer with a peel-away sheath was introduced into the vein over the guidewire.  Guidewire and introducer were then removed.  The catheter was advanced through the peel-away sheath without resistance. The peel-away sheath was removed.  The catheter was placed at 19.5 cm at the skin level.  The catheter was cut and connected to the port.  The port was placed in the subcutaneous pocket.  The port was accessed with a non-coring needle.  There was excellent blood return, PowerPort was finally flushed with 10 mL of solution of heparin flush.  The incision was closed in the usual fashion.  A chest x-ray performed in the operating room and showed the catheter was in excellent position and the PowerPort is ready to be used.           IKER SRIVASTAVA MD

## 2020-03-20 NOTE — ANESTHESIA CARE TRANSFER NOTE
Patient: Charlene Dia    Procedure(s):  POWER PORT PLACEMENT    Diagnosis: Ovarian cancer on left (H) [C56.2]  Diagnosis Additional Information: No value filed.    Anesthesia Type:   MAC     Note:  Airway :Face Mask  Patient transferred to:PACU  Handoff Report: Identifed the Patient, Identified the Reponsible Provider, Reviewed the pertinent medical history, Discussed the surgical course, Reviewed Intra-OP anesthesia mangement and issues during anesthesia, Set expectations for post-procedure period and Allowed opportunity for questions and acknowledgement of understanding      Vitals: (Last set prior to Anesthesia Care Transfer)    CRNA VITALS  3/20/2020 0907 - 3/20/2020 0942      3/20/2020             EKG:  Sinus rhythm                Electronically Signed By: GLORY Hayes CRNA  March 20, 2020  9:42 AM

## 2020-03-20 NOTE — ANESTHESIA PREPROCEDURE EVALUATION
Anesthesia Pre-Procedure Evaluation    Patient: Charlene Dia   MRN: [id number] : 1960          Preoperative Diagnosis: Ovarian cancer on left (H) [C56.2]    Procedure(s):  POWER PORT PLACEMENT    Past Medical History:   Diagnosis Date     Abdominal lump, pelvic mass      Acute bronchitis with bronchospasm      Anxiety 2012     Asthma, intermittent controlled      CARDIOVASCULAR SCREENING; LDL GOAL LESS THAN 130      Chronic neck pain      Chronic tension type headache      Cough      Epilepsy (H)      Epilepsy (H)      Epilepsy (H)      Failed hearing screening      Hammer toes of both feet      History of dry mouth      Hyperlipidemia      Hypertension      Insomnia      Insomnia      Mental developmental delay      Neurotic excoriations      Ovarian cancer (H)      Pain in both hands      Past Surgical History:   Procedure Laterality Date     CYSTECTOMY OVARIAN BENIGN      right     EYE SURGERY      hx cornea transplant     FOOT SURGERY       HYSTERECTOMY TOTAL ABDOMINAL, BILATERAL SALPINGO-OOPHORECTOMY, NODE DISSECTION, COMBINED N/A 2/10/2020    Procedure: TOTAL ABDOMINAL HYSTERECTOMY,  WITH BILATERAL SALPINGO-OOPHORECTOMY, TUMOR DEBULKING, OMENTECTOMY, PELVIC PARA  AORTIC LYMPHADENECTOMY, LEFT URETERAL LYSIS;  Surgeon: Paula Bryson MD;  Location:  OR     KERATOPLASTY (LAMELLAR GRAFT)  ', '96, '00, 02     LAPAROTOMY EXPLORATORY N/A 2020    Procedure: EXPLORATOMY LAPAROTOMY AND LYSIS OF ADHESIONS;  Surgeon: Paula Bryson MD;  Location:  OR     LAPAROTOMY, STAGING, COMBINED N/A 2/10/2020    Procedure: EXPLORATORY LAPAROTOMY;  Surgeon: Paula Bryson MD;  Location:  OR     No Known Allergies  Social History     Tobacco Use     Smoking status: Never Smoker     Smokeless tobacco: Never Used   Substance Use Topics     Alcohol use: No     Prior to Admission medications    Medication Sig Start Date End Date Taking? Authorizing Provider   atorvastatin (LIPITOR)  80 MG tablet Take 80 mg by mouth At Bedtime   Yes Reported, Patient   carbamazepine (TEGRETOL XR) 100 MG 12 hr tablet Take 400 mg by mouth 2 times daily (Takes 4 x 100mg = 400mg)   Yes Reported, Patient   losartan (COZAAR) 50 MG tablet Take 50 mg by mouth daily   Yes Reported, Patient   MELATONIN PO Take 10-15 mg by mouth every evening   Yes Reported, Patient   omeprazole (PRILOSEC OTC) 20 MG EC tablet Take 1 tablet (20 mg) by mouth 2 times daily 2/25/20  Yes Stephany Hanley APRN CNP   cyclobenzaprine (FLEXERIL) 5 MG tablet Take 5 mg by mouth nightly as needed for muscle spasms    Reported, Patient   furosemide (LASIX) 20 MG tablet Take 1 tablet (20 mg) by mouth daily 2/26/20   Stephany Hanley APRN CNP   HYDROmorphone, STANDARD CONC, (DILAUDID) 1 MG/ML oral solution Take 2 mLs (2 mg) by mouth every 4 hours as needed for moderate to severe pain 2/24/20   Stephany Hanley APRN CNP   ibuprofen (ADVIL/MOTRIN) 600 MG tablet Take 1 tablet (600 mg) by mouth every 6 hours as needed for moderate pain 2/10/20   Stephany Hanley APRN CNP   naphazoline-pheniramine (OPCON-A/VISINE A/NAPHCON A) SOLN ophthalmic solution Place 1-2 drops into both eyes 4 times daily as needed for irritation    Reported, Patient   potassium chloride (KAYCIEL) 20 MEQ/15ML (10%) solution Take 15 mLs (20 mEq) by mouth daily for 7 days 2/26/20 3/4/20  Stephany Hanley APRN CNP   sodium chloride (OCEAN) 0.65 % nasal spray Spray 1 spray into both nostrils every morning    Reported, Patient   timolol maleate (TIMOPTIC) 0.5 % ophthalmic solution Place 1 drop into both eyes every morning    Reported, Patient     Current Facility-Administered Medications Ordered in Epic   Medication Dose Route Frequency Last Rate Last Dose     heparin 100 UNIT/ML injection    PRN   10 mL at 03/20/20 0820     heparin 2000 units in 250 mL 0.9% NaCl    PRN   252 mL at 03/20/20 0820     No current [facility]-ordered outpatient medications on file.       Recent Labs    Lab Test 02/27/20  0722 02/25/20  0558    133   POTASSIUM 3.3* 3.6   CHLORIDE 106 104   CO2 25 23   ANIONGAP 5 6   * 103*   BUN 10 17   CR 0.66 0.75   FRED 8.9 8.2*     Recent Labs   Lab Test 02/26/20  1350 02/25/20  0558  02/19/20  0600 02/18/20  0712   WBC  --   --   --  7.1 5.0   HGB  --   --   --  10.0* 9.5*   * 505*   < > 469* 447    < > = values in this interval not displayed.     Recent Labs   Lab Test 02/07/20  1530   ABO A   RH Pos     Recent Labs   Lab Test 02/12/20  0606   TROPI <0.015     Recent Results (from the past 744 hour(s))   XR Chest Port 1 View    Narrative    EXAM: XR CHEST PORT 1 VW  LOCATION: [facility]  DATE/TIME: 2/19/2020 12:20 AM    INDICATION: PICC placement.  COMPARISON: 02/12/2020      Impression    IMPRESSION: Heart size within normal limits. Subsegmental bibasilar atelectasis. Right upper extremity PICC tip overlies the cavoatrial junction. No visible pneumothorax. Enteric tube terminates below the diaphragm and beyond the field-of-view.   Gas-filled loops of upper abdominal bowel. Upper abdominal midline skin staples.   XR Abdomen Port 1 View    Narrative    ABDOMEN ONE VIEW PORTABLE  2/19/2020 10:46 AM     HISTORY: Check NG tube placement.    COMPARISON: 2/17/2020      Impression    IMPRESSION: Nasogastric tube appears appropriately positioned with the  proximal sidehole within the stomach and the distal tip near the  distal stomach. Distended small bowel loops again noted, measuring up  to 4.0 cm. There appears to be gas in the colon as well. No gross free  air on this supine view.    TRINH MESSER MD   US Paracentesis    Narrative    US PARACENTESIS 2/27/2020 9:58 AM    CLINICAL HISTORY: Ascites requiring paracentesis    PROCEDURE: Informed consent obtained. Time out performed. The abdomen  was prepped and draped in a sterile fashion. 10 mL of 1% lidocaine was  infused into local soft tissues. A 5 [place] catheter system was  introduced into  the abdominal ascites under ultrasound guidance.    3.15 liters of clear janusz fluid were removed and sent to lab if  requested.    Patient tolerated procedure well.    Ultrasound imaging was obtained and placed in the patient's permanent  medical record.      Impression    IMPRESSION:  1.  Status post ultrasound-guided paracentesis.    RITA OVALLE MD       RECENT LABS:     Anesthesia Evaluation     . Pt has had prior anesthetic. Type: General    No history of anesthetic complications          ROS/MED HX    ENT/Pulmonary:     (+)Intermittent asthma , . .   (-) tobacco use, sleep apnea and recent URI   Neurologic:     (+)seizures    (-) CVA and TIA   Cardiovascular:     (+) Dyslipidemia, hypertension----. : . . . :. .      (-) angina, CAD, WALTERS, orthopnea/PND, syncope, arrhythmias, irregular heartbeat/palpitations, valvular problems/murmurs and angina   METS/Exercise Tolerance:  >4 METS   Hematologic:         Musculoskeletal:         GI/Hepatic: Comment: SBO       (-) GERD and liver disease   Renal/Genitourinary:     (+) Other Renal/ Genitourinary, pelvic mass, hydronephrosis   (-) renal disease   Endo:  - neg endo ROS       Psychiatric:     (+) psychiatric history anxiety      Infectious Disease:  - neg infectious disease ROS       Malignancy:   (+) Malignancy History of Other  Other CA ovarian CA Active status post Surgery         Other:                          Physical Exam  Normal systems: cardiovascular, pulmonary and dental    Airway   Mallampati: II  TM distance: >3 FB  Neck ROM: full    Dental     Cardiovascular   Rhythm and rate: regular and normal  (-) no murmur    Pulmonary    breath sounds clear to auscultation(-) no wheezes            Lab Results   Component Value Date    WBC 7.1 02/19/2020    HGB 10.0 (L) 02/19/2020    HCT 30.8 (L) 02/19/2020     (H) 02/26/2020     02/27/2020    POTASSIUM 3.3 (L) 02/27/2020    CHLORIDE 106 02/27/2020    CO2 25 02/27/2020    BUN 10 02/27/2020    CR 0.66  "02/27/2020     (H) 02/27/2020    FRED 8.9 02/27/2020    PHOS 3.6 02/27/2020    MAG 2.0 02/27/2020    ALBUMIN 2.5 (L) 02/27/2020    PROTTOTAL 6.5 (L) 02/27/2020     (H) 02/27/2020    AST 50 (H) 02/27/2020    ALKPHOS 210 (H) 02/27/2020    BILITOTAL 0.2 02/27/2020    LIPASE 529 (H) 04/29/2007    INR 1.05 02/26/2020    HCG Negative 04/29/2007       Preop Vitals  BP Readings from Last 3 Encounters:   03/20/20 120/82   02/27/20 119/52   02/20/12 130/90    Pulse Readings from Last 3 Encounters:   02/26/20 87   02/20/12 76      Resp Readings from Last 3 Encounters:   03/20/20 16   02/27/20 16   02/20/12 12    SpO2 Readings from Last 3 Encounters:   03/20/20 96%   02/27/20 94%      Temp Readings from Last 1 Encounters:   03/20/20 36.6  C (97.8  F) (Tympanic)    Ht Readings from Last 1 Encounters:   02/10/20 1.727 m (5' 8\")      Wt Readings from Last 1 Encounters:   03/20/20 70.3 kg (155 lb)    Estimated body mass index is 23.57 kg/m  as calculated from the following:    Height as of 2/10/20: 1.727 m (5' 8\").    Weight as of this encounter: 70.3 kg (155 lb).       Anesthesia Plan      History & Physical Review  History and physical reviewed and following examination; no interval change.    ASA Status:  3 .    NPO Status:  > 8 hours    Plan for MAC Reason for MAC:  Deep or markedly invasive procedure (G8) and Procedure to face, neck, head or breast  PONV prophylaxis:  Ondansetron (or other 5HT-3)  K slightly low previously while on lasix - now off.  Follow up with labs as outpatient.          Postoperative Care  Postoperative pain management:  IV analgesics.      Consents  Anesthetic plan, risks, benefits and alternatives discussed with:  Patient.  Use of blood products discussed: No .   .                 Ag Cantu MD  "

## 2020-03-20 NOTE — DISCHARGE INSTRUCTIONS
Same Day Surgery Discharge Instructions for  Sedation and General Anesthesia       It's not unusual to feel dizzy, light-headed or faint for up to 24 hours after surgery or while taking pain medication.  If you have these symptoms: sit for a few minutes before standing and have someone assist you when you get up to walk or use the bathroom.      You should rest and relax for the next 24 hours. We recommend you make arrangements to have an adult stay with you for at least 24 hours after your discharge.  Avoid hazardous and strenuous activity.      DO NOT DRIVE any vehicle or operate mechanical equipment for 24 hours following the end of your surgery.  Even though you may feel normal, your reactions may be affected by the medication you have received.      Do not drink alcoholic beverages for 24 hours following surgery.       Slowly progress to your regular diet as you feel able. It's not unusual to feel nauseated and/or vomit after receiving anesthesia.  If you develop these symptoms, drink clear liquids (apple juice, ginger ale, broth, 7-up, etc. ) until you feel better.  If your nausea and vomiting persists for 24 hours, please notify your surgeon.        All narcotic pain medications, along with inactivity and anesthesia, can cause constipation. Drinking plenty of liquids and increasing fiber intake will help.      For any questions of a medical nature, call your surgeon.      Do not make important decisions for 24 hours.      If you had general anesthesia, you may have a sore throat for a couple of days related to the breathing tube used during surgery.  You may use Cepacol lozenges to help with this discomfort.  If it worsens or if you develop a fever, contact your surgeon.       If you feel your pain is not well managed with the pain medications prescribed by your surgeon, please contact your surgeon's office to let them know so they can address your concerns.       CoVid 19 Information    We want to give you  information regarding Covid. Please consult your primary care provider with any questions you might have.     Patient who have symptoms (cough, fever, or shortness of breath), need to isolate for 7 days from when symptoms started OR 72 hours after fever resolves (without fever reducing medications) AND improvement of respiratory symptoms (whichever is longer).      Isolate yourself at home (in own room/own bathroom if possible)    Do Not allow any visitors    Do Not go to work or school    Do Not go to [employer],  centers, shopping, or other public places.    Do Not shake hands.    Avoid close and intimate contact with others (hugging, kissing).    Follow CDC recommendations for household cleaning of frequently touched services.     After the initial 7 days, continue to isolate yourself from household members as much as possible. To continue decrease the risk of community spread and exposure, you and any members of your household should limit activities in public for 14 days after starting home isolation.     You can reference the following CDC link for helpful home isolation/care tips:  https://www.cdc.gov/coronavirus/2019-ncov/downloads/10Things.pdf    Protect Others:    Cover Your Mouth and Nose with a mask, disposable tissue or wash cloth to avoid spreading germs to others.    Wash your hands and face frequently with soap and water    Call Your Primary Doctor If: Breathing difficulty develops or you become worse.    For more information about COVID19 and options for caring for yourself at home, please visit the CDC website at https://www.cdc.gov/coronavirus/2019-ncov/about/steps-when-sick.html  For more options for care at [facility], please visit our website at [email or web address]          **If you have questions or concerns about your procedure,  call Dr. Srivastava at [phone]**        Discharge Instructions for Power Port Placement      General Instructions:    You  will be given a card with information about your port.  You should carry this with you in your wallet/billfold. It provides information to clinicians about your port.  You should also carry the card in case your port triggers any security devices.     Activity:    Limit your activity for the first few days after your port is placed    Incision Care:     Unless otherwise directed by your surgeon, the dressing may removed tomorrow.      If a topical skin adhesive was used to close incision, you may shower tomorrow. Do not use soaps, lotions, or ointments on the wound area. Do not scrub the wound. After bathing, pat the wound dry with a soft towel.  Do not scratch, rub, or pick at the strips or film. Do not place tape directly over the strips or film.  Do not apply liquids (such as peroxide), ointments, or creams to the wound while the strips or film are in place.    Call your surgeon if you have:     Redness, swelling or drainage from incision     A fever of 101 F or greater.      Nausea or vomiting.     Pain that is not controlled by medications and/or rest.     Questions or concerns.

## 2020-03-21 NOTE — ANESTHESIA POSTPROCEDURE EVALUATION
Patient: Charlene Dia    Procedure(s):  POWER PORT PLACEMENT    Diagnosis:Ovarian cancer on left (H) [C56.2]  Diagnosis Additional Information: No value filed.    Anesthesia Type:  MAC    Note:  Anesthesia Post Evaluation    Patient location during evaluation: PACU  Patient participation: Able to fully participate in evaluation  Level of consciousness: awake and alert  Pain management: adequate  Airway patency: patent  Cardiovascular status: acceptable and hemodynamically stable  Respiratory status: nonlabored ventilation, unassisted and acceptable  Hydration status: acceptable  PONV: none             Last vitals:  Vitals:    03/20/20 0945 03/20/20 1000 03/20/20 1030   BP: 100/52 111/62 114/59   Pulse: 81 81 85   Resp: 16 20 18   Temp:   36.4  C (97.5  F)   SpO2: 99% 96% 97%         Electronically Signed By: Ag Cantu MD  March 21, 2020  4:42 PM

## 2020-07-29 ENCOUNTER — HOSPITAL ENCOUNTER (OUTPATIENT)
Facility: CLINIC | Age: 60
Discharge: HOME | End: 2020-07-29
Admitting: NURSE PRACTITIONER
Payer: MEDICARE

## 2020-07-29 ENCOUNTER — HOSPITAL ENCOUNTER (OUTPATIENT)
Dept: CT IMAGING | Facility: CLINIC | Age: 60
End: 2020-07-29
Attending: OBSTETRICS & GYNECOLOGY
Payer: MEDICARE

## 2020-07-29 DIAGNOSIS — C56.9 OVARIAN CANCER (H): ICD-10-CM

## 2020-07-29 PROCEDURE — 25000128 H RX IP 250 OP 636: Performed by: NURSE PRACTITIONER

## 2020-07-29 PROCEDURE — 25000125 ZZHC RX 250: Performed by: OBSTETRICS & GYNECOLOGY

## 2020-07-29 PROCEDURE — 40000863 ZZH STATISTIC RADIOLOGY XRAY, US, CT, MAR, NM

## 2020-07-29 PROCEDURE — 74177 CT ABD & PELVIS W/CONTRAST: CPT

## 2020-07-29 PROCEDURE — 25000128 H RX IP 250 OP 636: Performed by: OBSTETRICS & GYNECOLOGY

## 2020-07-29 RX ORDER — HEPARIN SODIUM,PORCINE 10 UNIT/ML
5-10 VIAL (ML) INTRAVENOUS EVERY 24 HOURS
Status: DISCONTINUED | OUTPATIENT
Start: 2020-07-29 | End: 2020-07-29 | Stop reason: HOSPADM

## 2020-07-29 RX ORDER — IOPAMIDOL 755 MG/ML
76 INJECTION, SOLUTION INTRAVASCULAR ONCE
Status: COMPLETED | OUTPATIENT
Start: 2020-07-29 | End: 2020-07-29

## 2020-07-29 RX ORDER — LIDOCAINE 40 MG/G
CREAM TOPICAL
Status: DISCONTINUED | OUTPATIENT
Start: 2020-07-29 | End: 2020-07-29 | Stop reason: HOSPADM

## 2020-07-29 RX ORDER — HEPARIN SODIUM,PORCINE 10 UNIT/ML
5-10 VIAL (ML) INTRAVENOUS
Status: DISCONTINUED | OUTPATIENT
Start: 2020-07-29 | End: 2020-07-29 | Stop reason: HOSPADM

## 2020-07-29 RX ORDER — HEPARIN SODIUM (PORCINE) LOCK FLUSH IV SOLN 100 UNIT/ML 100 UNIT/ML
5 SOLUTION INTRAVENOUS
Status: DISCONTINUED | OUTPATIENT
Start: 2020-07-29 | End: 2020-07-29 | Stop reason: HOSPADM

## 2020-07-29 RX ADMIN — HEPARIN SODIUM (PORCINE) LOCK FLUSH IV SOLN 100 UNIT/ML 5 ML: 100 SOLUTION at 16:44

## 2020-07-29 RX ADMIN — SODIUM CHLORIDE 62 ML: 9 INJECTION, SOLUTION INTRAVENOUS at 16:54

## 2020-07-29 RX ADMIN — IOPAMIDOL 76 ML: 755 INJECTION, SOLUTION INTRAVENOUS at 16:53

## 2021-01-15 ENCOUNTER — HEALTH MAINTENANCE LETTER (OUTPATIENT)
Age: 61
End: 2021-01-15

## 2021-03-21 ENCOUNTER — HEALTH MAINTENANCE LETTER (OUTPATIENT)
Age: 61
End: 2021-03-21

## 2021-09-05 ENCOUNTER — HEALTH MAINTENANCE LETTER (OUTPATIENT)
Age: 61
End: 2021-09-05

## 2021-10-31 ENCOUNTER — HEALTH MAINTENANCE LETTER (OUTPATIENT)
Age: 61
End: 2021-10-31

## 2022-04-17 ENCOUNTER — HEALTH MAINTENANCE LETTER (OUTPATIENT)
Age: 62
End: 2022-04-17

## 2022-10-23 ENCOUNTER — HEALTH MAINTENANCE LETTER (OUTPATIENT)
Age: 62
End: 2022-10-23

## 2023-06-01 ENCOUNTER — HEALTH MAINTENANCE LETTER (OUTPATIENT)
Age: 63
End: 2023-06-01

## 2023-11-05 ENCOUNTER — HEALTH MAINTENANCE LETTER (OUTPATIENT)
Age: 63
End: 2023-11-05

## (undated) DEVICE — SPONGE LAP 18X18" X8435

## (undated) DEVICE — GLOVE PROTEXIS MICRO 6.5  2D73PM65

## (undated) DEVICE — SU VICRYL 0 CT-2 27" J334H

## (undated) DEVICE — DRAIN JACKSON PRATT 10FR ROUND SU130-1321

## (undated) DEVICE — DECANTER VIAL 2006S

## (undated) DEVICE — SU VICRYL 2-0 SH 27" J317H

## (undated) DEVICE — SPONGE RAY-TEC 4X4" 7317

## (undated) DEVICE — DRAIN JACKSON PRATT RESERVOIR 100ML SU130-1305

## (undated) DEVICE — LINEN GOWN OVERSIZE 5408

## (undated) DEVICE — ESU PENCIL W/HOLSTER E2350H

## (undated) DEVICE — SU VICRYL 0 TIE 12X18" J906G

## (undated) DEVICE — DRAIN JACKSON PRATT 15FR ROUND SIL LF JP-2229

## (undated) DEVICE — PREP CHLORAPREP W/ORANGE TINT 10.5ML 260715

## (undated) DEVICE — DRAPE LAP TRANSVERSE 29421

## (undated) DEVICE — GLOVE PROTEXIS W/NEU-THERA 7.5  2D73TE75

## (undated) DEVICE — SU VICRYL 3-0 SH 27" J316H

## (undated) DEVICE — ESU GROUND PAD UNIVERSAL W/O CORD

## (undated) DEVICE — DRSG TELFA ISLAND 4X10"

## (undated) DEVICE — DRAPE CV SPLIT II 147X106" 9158

## (undated) DEVICE — ESU ELEC BLADE 2.75" COATED/INSULATED E1455

## (undated) DEVICE — CLIP ETHICON LIGACLIP MED WHITE LT200

## (undated) DEVICE — DECANTER BAG 2002S

## (undated) DEVICE — SUCTION CANISTER MEDIVAC LINER 3000ML W/LID 65651-530

## (undated) DEVICE — WIPES FOLEY CARE SURESTEP PROVON DFC100

## (undated) DEVICE — SU VICRYL 4-0 PS-2 18" UND J496H

## (undated) DEVICE — BARRIER SEPRAFILM 5X6" SINGLE SHEET 4301-02

## (undated) DEVICE — SOL WATER IRRIG 1000ML BOTTLE 2F7114

## (undated) DEVICE — BNDG ABDOMINAL BINDER 9X45-62" 79-89071

## (undated) DEVICE — GLOVE PROTEXIS BLUE W/NEU-THERA 6.5  2D73EB65

## (undated) DEVICE — DRSG TELFA ISLAND 4X14" 7544

## (undated) DEVICE — DRAPE LEGGINGS 8421

## (undated) DEVICE — LINEN TOWEL PACK X5 5464

## (undated) DEVICE — STPL SKIN 35W ROTATING HEAD PRW35

## (undated) DEVICE — GLOVE BIOGEL PI ULTRATOUCH G SZ 6.5 42165

## (undated) DEVICE — SU ETHIBOND 1 CT-1 30" X425H

## (undated) DEVICE — SU PDS II 1 TP-1 48" Z880G

## (undated) DEVICE — RX SURGIFLO HEMOSTATIC MATRIX W/THROMBIN 8ML 2994

## (undated) DEVICE — NDL 19GA 1.5"

## (undated) DEVICE — DRAPE SHEET REV FOLD 3/4 9349

## (undated) DEVICE — SYR 10ML SLIP TIP W/O NDL

## (undated) DEVICE — ADH SKIN CLOSURE PREMIERPRO EXOFIN 1.0ML 3470

## (undated) DEVICE — PREP SKIN SCRUB TRAY 4461A

## (undated) DEVICE — STPL REMOVER PSX

## (undated) DEVICE — STPL SKIN PROXIMATE 35 WIDE PMW35

## (undated) DEVICE — VESSEL LOOPS RED MAXI

## (undated) DEVICE — BLADE KNIFE SURG 10 371110

## (undated) DEVICE — SYR 50ML CATH TIP W/O NDL 309620

## (undated) DEVICE — DRSG GAUZE 4X4" 3033

## (undated) DEVICE — KIT ABDOMINAL HYST SMA15AHFSM

## (undated) DEVICE — PACK MINOR SBA15MIFSE

## (undated) DEVICE — ESU LIGASURE IMPACT OPEN SEALER/DVDR CVD LG JAW LF4418

## (undated) DEVICE — SPONGE KITTNER 31001010

## (undated) DEVICE — Device

## (undated) DEVICE — DRAPE MAYO STAND 23X54 8337

## (undated) DEVICE — GOWN IMPERVIOUS ZONED LG

## (undated) DEVICE — SU VICRYL 0 CT-1 27" UND J260H

## (undated) DEVICE — CATH TRAY FOLEY SURESTEP 16FR WDRAIN BAG STLK LATEX A300316A

## (undated) RX ORDER — PROPOFOL 10 MG/ML
INJECTION, EMULSION INTRAVENOUS
Status: DISPENSED
Start: 2020-03-20

## (undated) RX ORDER — CEFAZOLIN SODIUM 2 G/100ML
INJECTION, SOLUTION INTRAVENOUS
Status: DISPENSED
Start: 2020-02-10

## (undated) RX ORDER — HYDROMORPHONE HYDROCHLORIDE 1 MG/ML
INJECTION, SOLUTION INTRAMUSCULAR; INTRAVENOUS; SUBCUTANEOUS
Status: DISPENSED
Start: 2020-02-18

## (undated) RX ORDER — GLYCOPYRROLATE 0.2 MG/ML
INJECTION, SOLUTION INTRAMUSCULAR; INTRAVENOUS
Status: DISPENSED
Start: 2020-02-18

## (undated) RX ORDER — ONDANSETRON 2 MG/ML
INJECTION INTRAMUSCULAR; INTRAVENOUS
Status: DISPENSED
Start: 2020-02-18

## (undated) RX ORDER — ACETAMINOPHEN 325 MG/1
TABLET ORAL
Status: DISPENSED
Start: 2020-02-10

## (undated) RX ORDER — LIDOCAINE HYDROCHLORIDE 20 MG/ML
INJECTION, SOLUTION EPIDURAL; INFILTRATION; INTRACAUDAL; PERINEURAL
Status: DISPENSED
Start: 2020-03-20

## (undated) RX ORDER — HEPARIN SODIUM (PORCINE) LOCK FLUSH IV SOLN 100 UNIT/ML 100 UNIT/ML
SOLUTION INTRAVENOUS
Status: DISPENSED
Start: 2020-03-20

## (undated) RX ORDER — FENTANYL CITRATE 50 UG/ML
INJECTION, SOLUTION INTRAMUSCULAR; INTRAVENOUS
Status: DISPENSED
Start: 2020-02-18

## (undated) RX ORDER — LIDOCAINE HYDROCHLORIDE 10 MG/ML
INJECTION, SOLUTION INFILTRATION; PERINEURAL
Status: DISPENSED
Start: 2020-03-20

## (undated) RX ORDER — GLYCOPYRROLATE 0.2 MG/ML
INJECTION, SOLUTION INTRAMUSCULAR; INTRAVENOUS
Status: DISPENSED
Start: 2020-02-10

## (undated) RX ORDER — FENTANYL CITRATE 50 UG/ML
INJECTION, SOLUTION INTRAMUSCULAR; INTRAVENOUS
Status: DISPENSED
Start: 2020-03-20

## (undated) RX ORDER — HYDROMORPHONE HYDROCHLORIDE 1 MG/ML
INJECTION, SOLUTION INTRAMUSCULAR; INTRAVENOUS; SUBCUTANEOUS
Status: DISPENSED
Start: 2020-02-10

## (undated) RX ORDER — HEPARIN SODIUM 1000 [USP'U]/ML
INJECTION, SOLUTION INTRAVENOUS; SUBCUTANEOUS
Status: DISPENSED
Start: 2020-03-20

## (undated) RX ORDER — PROPOFOL 10 MG/ML
INJECTION, EMULSION INTRAVENOUS
Status: DISPENSED
Start: 2020-02-18

## (undated) RX ORDER — NEOSTIGMINE METHYLSULFATE 1 MG/ML
VIAL (ML) INJECTION
Status: DISPENSED
Start: 2020-02-18

## (undated) RX ORDER — ONDANSETRON 2 MG/ML
INJECTION INTRAMUSCULAR; INTRAVENOUS
Status: DISPENSED
Start: 2020-03-20

## (undated) RX ORDER — FENTANYL CITRATE 50 UG/ML
INJECTION, SOLUTION INTRAMUSCULAR; INTRAVENOUS
Status: DISPENSED
Start: 2020-02-10

## (undated) RX ORDER — LIDOCAINE HYDROCHLORIDE 20 MG/ML
INJECTION, SOLUTION EPIDURAL; INFILTRATION; INTRACAUDAL; PERINEURAL
Status: DISPENSED
Start: 2020-02-18

## (undated) RX ORDER — HEPARIN SODIUM (PORCINE) LOCK FLUSH IV SOLN 100 UNIT/ML 100 UNIT/ML
SOLUTION INTRAVENOUS
Status: DISPENSED
Start: 2020-07-29

## (undated) RX ORDER — DEXAMETHASONE SODIUM PHOSPHATE 4 MG/ML
INJECTION, SOLUTION INTRA-ARTICULAR; INTRALESIONAL; INTRAMUSCULAR; INTRAVENOUS; SOFT TISSUE
Status: DISPENSED
Start: 2020-02-18

## (undated) RX ORDER — ONDANSETRON 2 MG/ML
INJECTION INTRAMUSCULAR; INTRAVENOUS
Status: DISPENSED
Start: 2020-02-10